# Patient Record
Sex: FEMALE | Race: WHITE | NOT HISPANIC OR LATINO | Employment: FULL TIME | URBAN - METROPOLITAN AREA
[De-identification: names, ages, dates, MRNs, and addresses within clinical notes are randomized per-mention and may not be internally consistent; named-entity substitution may affect disease eponyms.]

---

## 2017-01-06 ENCOUNTER — GENERIC CONVERSION - ENCOUNTER (OUTPATIENT)
Dept: OTHER | Facility: OTHER | Age: 52
End: 2017-01-06

## 2017-09-18 ENCOUNTER — ALLSCRIPTS OFFICE VISIT (OUTPATIENT)
Dept: OTHER | Facility: OTHER | Age: 52
End: 2017-09-18

## 2017-09-18 DIAGNOSIS — Z13.820 ENCOUNTER FOR SCREENING FOR OSTEOPOROSIS: ICD-10-CM

## 2017-09-18 DIAGNOSIS — Z12.31 ENCOUNTER FOR SCREENING MAMMOGRAM FOR MALIGNANT NEOPLASM OF BREAST: ICD-10-CM

## 2017-09-18 LAB
BILIRUB UR QL STRIP: 1
CLARITY UR: NORMAL
COLOR UR: CLEAR
GLUCOSE (HISTORICAL): NORMAL
HGB UR QL STRIP.AUTO: NORMAL
KETONES UR STRIP-MCNC: NORMAL MG/DL
LEUKOCYTE ESTERASE UR QL STRIP: 75
NITRITE UR QL STRIP: NORMAL
PH UR STRIP.AUTO: 7 [PH]
PROT UR STRIP-MCNC: NORMAL MG/DL
SP GR UR STRIP.AUTO: 1.01
UROBILINOGEN UR QL STRIP.AUTO: 1

## 2017-09-20 ENCOUNTER — GENERIC CONVERSION - ENCOUNTER (OUTPATIENT)
Dept: OTHER | Facility: OTHER | Age: 52
End: 2017-09-20

## 2017-10-11 ENCOUNTER — GENERIC CONVERSION - ENCOUNTER (OUTPATIENT)
Dept: OTHER | Facility: OTHER | Age: 52
End: 2017-10-11

## 2017-10-11 LAB
A/G RATIO (HISTORICAL): 1.9 (ref 1.2–2.2)
ALBUMIN SERPL BCP-MCNC: 4.1 G/DL (ref 3.5–5.5)
ALP SERPL-CCNC: 64 IU/L (ref 39–117)
ALT SERPL W P-5'-P-CCNC: 20 IU/L (ref 0–32)
AMYLASE (HISTORICAL): 44 U/L (ref 31–124)
AST SERPL W P-5'-P-CCNC: 20 IU/L (ref 0–40)
BASOPHILS # BLD AUTO: 0.1 X10E3/UL (ref 0–0.2)
BASOPHILS # BLD AUTO: 1 %
BILIRUB SERPL-MCNC: 0.7 MG/DL (ref 0–1.2)
BUN SERPL-MCNC: 14 MG/DL (ref 6–24)
BUN/CREA RATIO (HISTORICAL): 21 (ref 9–23)
CALCIUM SERPL-MCNC: 9.1 MG/DL (ref 8.7–10.2)
CHLORIDE SERPL-SCNC: 100 MMOL/L (ref 96–106)
CHOLEST SERPL-MCNC: 212 MG/DL (ref 100–199)
CHOLEST/HDLC SERPL: 4.8 RATIO UNITS (ref 0–4.4)
CO2 SERPL-SCNC: 25 MMOL/L (ref 18–29)
CREAT SERPL-MCNC: 0.67 MG/DL (ref 0.57–1)
DEPRECATED RDW RBC AUTO: 12.7 % (ref 12.3–15.4)
EGFR AFRICAN AMERICAN (HISTORICAL): 116 ML/MIN/1.73
EGFR-AMERICAN CALC (HISTORICAL): 101 ML/MIN/1.73
EOSINOPHIL # BLD AUTO: 0.2 X10E3/UL (ref 0–0.4)
EOSINOPHIL # BLD AUTO: 4 %
GLUCOSE SERPL-MCNC: 99 MG/DL (ref 65–99)
HCT VFR BLD AUTO: 40.5 % (ref 34–46.6)
HDLC SERPL-MCNC: 44 MG/DL
HGB BLD-MCNC: 14 G/DL (ref 11.1–15.9)
IMM.GRANULOCYTES (CD4/8) (HISTORICAL): 0 %
IMM.GRANULOCYTES (CD4/8) (HISTORICAL): 0 X10E3/UL (ref 0–0.1)
LDLC SERPL CALC-MCNC: 132 MG/DL (ref 0–99)
LIPASE SERPL-CCNC: 30 U/L (ref 14–72)
LYMPHOCYTES # BLD AUTO: 2 X10E3/UL (ref 0.7–3.1)
LYMPHOCYTES # BLD AUTO: 36 %
MAGNESIUM SERPL-MCNC: 2 MG/DL (ref 1.6–2.3)
MCH RBC QN AUTO: 30.8 PG (ref 26.6–33)
MCHC RBC AUTO-ENTMCNC: 34.6 G/DL (ref 31.5–35.7)
MCV RBC AUTO: 89 FL (ref 79–97)
MONOCYTES # BLD AUTO: 0.4 X10E3/UL (ref 0.1–0.9)
MONOCYTES (HISTORICAL): 8 %
NEUTROPHILS # BLD AUTO: 2.8 X10E3/UL (ref 1.4–7)
NEUTROPHILS # BLD AUTO: 51 %
PLATELET # BLD AUTO: 255 X10E3/UL (ref 150–379)
POTASSIUM SERPL-SCNC: 4.4 MMOL/L (ref 3.5–5.2)
RBC (HISTORICAL): 4.55 X10E6/UL (ref 3.77–5.28)
SODIUM SERPL-SCNC: 139 MMOL/L (ref 134–144)
TOT. GLOBULIN, SERUM (HISTORICAL): 2.2 G/DL (ref 1.5–4.5)
TOTAL PROTEIN (HISTORICAL): 6.3 G/DL (ref 6–8.5)
TRIGL SERPL-MCNC: 178 MG/DL (ref 0–149)
VLDLC SERPL CALC-MCNC: 36 MG/DL (ref 5–40)
WBC # BLD AUTO: 5.6 X10E3/UL (ref 3.4–10.8)

## 2017-10-12 LAB
ANTI-NUCLEAR ANTIBODY (ANA) (HISTORICAL): NEGATIVE
BACTERIA UR QL AUTO: ABNORMAL
BILIRUB UR QL STRIP: NEGATIVE
COLOR UR: YELLOW
COMMENT (HISTORICAL): CLEAR
FECAL OCCULT BLOOD DIAGNOSTIC (HISTORICAL): NEGATIVE
GLUCOSE (HISTORICAL): NEGATIVE
HBA1C MFR BLD HPLC: 5.2 % (ref 4.8–5.6)
HEPATITIS C ANTIBODY (HISTORICAL): <0.1 S/CO RATIO (ref 0–0.9)
KETONES UR STRIP-MCNC: NEGATIVE MG/DL
LEUKOCYTE ESTERASE UR QL STRIP: ABNORMAL
MICROSCOPIC EXAMINATION (HISTORICAL): ABNORMAL
MUCUS THREADS (HISTORICAL): PRESENT
NITRITE UR QL STRIP: NEGATIVE
NON-SQ EPI CELLS URNS QL MICRO: ABNORMAL /HPF
PH UR STRIP.AUTO: 6 [PH] (ref 5–7.5)
PROT UR STRIP-MCNC: NEGATIVE MG/DL
RBC (HISTORICAL): ABNORMAL /HPF
SP GR UR STRIP.AUTO: 1.02 (ref 1–1.03)
TSH SERPL DL<=0.05 MIU/L-ACNC: 2.21 UIU/ML (ref 0.45–4.5)
UROBILINOGEN UR QL STRIP.AUTO: 0.2 EU/DL (ref 0.2–1)
WBC # BLD AUTO: ABNORMAL /HPF

## 2017-10-13 ENCOUNTER — GENERIC CONVERSION - ENCOUNTER (OUTPATIENT)
Dept: OTHER | Facility: OTHER | Age: 52
End: 2017-10-13

## 2017-10-13 LAB
CULTURE RESULT (HISTORICAL): ABNORMAL
INTERPRETATION (HISTORICAL): NORMAL
MISCELLANEOUS LAB TEST RESULT (HISTORICAL): ABNORMAL

## 2017-11-01 ENCOUNTER — HOSPITAL ENCOUNTER (OUTPATIENT)
Dept: RADIOLOGY | Facility: CLINIC | Age: 52
Discharge: HOME | End: 2017-11-01
Payer: COMMERCIAL

## 2017-11-01 DIAGNOSIS — Z13.820 ENCOUNTER FOR SCREENING FOR OSTEOPOROSIS: ICD-10-CM

## 2017-11-02 ENCOUNTER — HOSPITAL ENCOUNTER (OUTPATIENT)
Dept: RADIOLOGY | Facility: CLINIC | Age: 52
Discharge: HOME | End: 2017-11-02
Payer: COMMERCIAL

## 2017-11-02 PROCEDURE — 77080 DXA BONE DENSITY AXIAL: CPT

## 2017-11-28 ENCOUNTER — GENERIC CONVERSION - ENCOUNTER (OUTPATIENT)
Dept: OTHER | Facility: OTHER | Age: 52
End: 2017-11-28

## 2018-01-09 ENCOUNTER — GENERIC CONVERSION - ENCOUNTER (OUTPATIENT)
Dept: FAMILY MEDICINE CLINIC | Facility: CLINIC | Age: 53
End: 2018-01-09

## 2018-01-11 NOTE — RESULT NOTES
Verified Results  (1) AMYLASE [id number] 07:46AM Dickie Barley     Test Name Result Flag Reference   Amylase, Serum 44 U/L       (1) CBC/PLT/DIFF 11Oct2017 07:46AM Dickie Barley     Test Name Result Flag Reference   WBC 5 6 x10E3/uL  3 4-10 8   RBC 4 55 x10E6/uL  3 77-5 28   Hemoglobin 14 0 g/dL  11 1-15 9   Hematocrit 40 5 %  34 0-46  6   MCV 89 fL  79-97   MCH 30 8 pg  26 6-33 0   MCHC 34 6 g/dL  31 5-35 7   RDW 12 7 %  12 3-15 4   Platelets 825 G57E2/QK  150-379   Neutrophils 51 %  Not Estab  Lymphs 36 %  Not Estab  Monocytes 8 %  Not Estab  Eos 4 %  Not Estab  Basos 1 %  Not Estab  Neutrophils (Absolute) 2 8 x10E3/uL  1 4-7 0   Lymphs (Absolute) 2 0 x10E3/uL  0 7-3 1   Monocytes(Absolute) 0 4 x10E3/uL  0 1-0 9   Eos (Absolute) 0 2 x10E3/uL  0 0-0 4   Baso (Absolute) 0 1 x10E3/uL  0 0-0 2   Immature Granulocytes 0 %  Not Estab     Immature Grans (Abs) 0 0 x10E3/uL  0 0-0 1     (1) COMPREHENSIVE METABOLIC PANEL [id number] 60:52HW Dickie Barley     Test Name Result Flag Reference   Glucose, Serum 99 mg/dL  65-99   BUN 14 mg/dL  6-24   Creatinine, Serum 0 67 mg/dL  0 57-1 00   BUN/Creatinine Ratio 21  9-23   Sodium, Serum 139 mmol/L  134-144   Potassium, Serum 4 4 mmol/L  3 5-5 2   Chloride, Serum 100 mmol/L     Carbon Dioxide, Total 25 mmol/L  18-29   Calcium, Serum 9 1 mg/dL  8 7-10 2   Protein, Total, Serum 6 3 g/dL  6 0-8 5   Albumin, Serum 4 1 g/dL  3 5-5 5   Globulin, Total 2 2 g/dL  1 5-4 5   A/G Ratio 1 9  1 2-2 2   Bilirubin, Total 0 7 mg/dL  0 0-1 2   Alkaline Phosphatase, S 64 IU/L     AST (SGOT) 20 IU/L  0-40   ALT (SGPT) 20 IU/L  0-32   eGFR If NonAfricn Am 101 mL/min/1 73  >59   eGFR If Africn Am 116 mL/min/1 73  >59     (1) LIPASE 11Oct2017 07:46AM Dickie Barley     Test Name Result Flag Reference   Lipase, Serum 30 U/L  14-72   **Please note reference interval change**     (1) LIPID PANEL, FASTING [id number] 07:46AM Dickie Barley     Test Name Result Flag Reference   Cholesterol, Total 212 mg/dL H 100-199   Triglycerides 178 mg/dL H 0-149   HDL Cholesterol 44 mg/dL  >39   VLDL Cholesterol Navin 36 mg/dL  5-40   LDL Cholesterol Calc 132 mg/dL H 0-99   T  Chol/HDL Ratio 4 8 ratio units H 0 0-4 4   T  Chol/HDL Ratio                                                             Men  Women                                               1/2 Avg  Risk  3 4    3 3                                                   Avg Risk  5 0    4 4                                                2X Avg  Risk  9 6    7 1                                                3X Avg  Risk 23 4   11 0     (1) MAGNESIUM 11Oct2017 07:46AM [facility]     Test Name Result Flag Reference   Magnesium, Serum 2 0 mg/dL  1 6-2 3     (1) TSH WITH FT4 REFLEX 11Oct2017 07:46AM [facility]     Test Name Result Flag Reference   TSH 2 210 uIU/mL  0 450-4 500     (1) HEMOGLOBIN A1C 11Oct2017 07:46AM [facility]     Test Name Result Flag Reference   Hemoglobin A1c 5 2 %  4 8-5 6   Pre-diabetes: 5 7 - 6 4           Diabetes: >6 4           Glycemic control for adults with diabetes: <7 0     (LC) Urinalysis, Complete 11Oct2017 07:46AM [facility]     Test Name Result Flag Reference   Specific Gravity 1 019  1 005-1 030   pH 6 0  5 0-7 5   Urine-Color Yellow  Yellow   Appearance Clear  Clear   WBC Esterase 1+ A Negative   Protein Negative  Negative/Trace   Glucose Negative  Negative   Ketones Negative  Negative   Occult Blood Negative  Negative   Bilirubin Negative  Negative   Urobilinogen,Semi-Qn 0 2 EU/dL  0 2-1 0   Nitrite, Urine Negative  Negative   Microscopic Examination See below:     WBC 6-10 /hpf A 0 -  5   RBC 0-2 /hpf  0 -  2   Epithelial Cells (non renal) 0-10 /hpf  0 - 10   Mucus Threads Present  Not Estab     Bacteria None seen  None seen/Few     (LC) Urine Culture, Routine 11Oct2017 07:46AM [facility]     Test Name Result Flag Reference   Urine Culture, Routine Final report A Result 1 Comment A    Beta hemolytic Streptococcus, group B  Less than 10,000 colonies/mL  Penicillin and ampicillin are drugs of choice for treatment of  beta-hemolytic streptococcal infections  Susceptibility testing of  penicillins and other beta-lactam agents approved by the FDA for  treatment of beta-hemolytic streptococcal infections need not be  performed routinely because nonsusceptible isolates are extremely  rare in any beta-hemolytic streptococcus and have not been reported  for Streptococcus pyogenes (group A)  (CLSI 2011)     ([facility] [facility]) HCV Antibody [id number] 07:46AM [facility]     Test Name Result Flag Reference   Hep C Virus Ab <0 1 s/co ratio  0 0-0 9   Negative:     < 0 8                                              Indeterminate: 0 8 - 0 9                                                   Positive:     > 0 9                  The CDC recommends that a positive HCV antibody result                  be followed up with a HCV Nucleic Acid Amplification                  test (673725)       [facility]) BILLIE w/Reflex [id number] 07:46AM [facility]     Test Name Result Flag Reference   BILLIE Direct Negative  Negative

## 2018-01-12 NOTE — RESULT NOTES
Verified Results  Urine Dip Automated- POC [id number] 09:12AM Houston Mariah     Test Name Result Flag Reference   Color Clear     Clarity Transparent     Leukocytes 75     Nitrite neg     Blood neg     Bilirubin 1     Urobilinogen 1     Protein neg     Ph 7     Specific Gravity 1 010     Ketone neg     Glucose norm

## 2018-01-12 NOTE — RESULT NOTES
Message   please inform chest x-ray normal-thanks     Verified Results  * XR CHEST PA & LATERAL [id number] 09:16AM Troy Fend     Test Name Result Flag Reference   XR CHEST PA & LATERAL (Report)     CHEST - DUAL ENERGY     INDICATION: Wheezing and cough  Smoker with possible COPD  COMPARISON: None     VIEWS: PA (including soft tissue/bone algorithms) and lateral projections; 4 images     FINDINGS:        Cardiomediastinal silhouette appears unremarkable  The lungs are clear  No pneumothorax or pleural effusion  Visualized osseous structures appear within normal limits for the patient's age  IMPRESSION:     No active pulmonary disease  Workstation performed: [id number]     Signed by:    Fred Cherry MD   9/26/16

## 2018-01-13 ENCOUNTER — GENERIC CONVERSION - ENCOUNTER (OUTPATIENT)
Dept: OTHER | Facility: OTHER | Age: 53
End: 2018-01-13

## 2018-01-13 VITALS
HEART RATE: 70 BPM | BODY MASS INDEX: 30.36 KG/M2 | TEMPERATURE: 97.4 F | SYSTOLIC BLOOD PRESSURE: 120 MMHG | WEIGHT: 205 LBS | DIASTOLIC BLOOD PRESSURE: 80 MMHG | HEIGHT: 69 IN | RESPIRATION RATE: 16 BRPM

## 2018-01-13 NOTE — PROGRESS NOTES
Assessment    1  Encounter for annual physical exam (V70 0) (Z00 00)   2  Body mass index (BMI) of 30 0 to 30 9 in adult (V85 30) (Z68 30)   3  Need for influenza vaccination (V04 81) (Z23)    Plan  Abnormal blood sugar    · (1) HEMOGLOBIN A1C; Status:Active; Requested for:37Nua3608; Abnormal blood sugar, Dysuria, Fatigue    · (LC) Urinalysis, Complete; Status:Active; Requested for:87Klx0115; Allergic asthma    · Montelukast Sodium 10 MG Oral Tablet (Singulair); one tab po qpm  Anxiety    · Citalopram Hydrobromide 20 MG Oral Tablet; TAKE 1 TABLET BY MOUTH  ONCE A DAY  Anxiety, Health Maintenance, Fatigue, Hyperlipidemia, Myalgia, Nontoxic goiter,  unspecified, Painful joint, Palpitation    · (1) AMYLASE; Status:Active; Requested for:78Tfo2998;    · (1) BILLIE SCREEN W/REFLEX TO TITER/PATTERN; Status:Active; Requested  for:68Avz5215;    · (1) CBC/PLT/DIFF; Status:Active; Requested for:48Rwr2336;    · (1) COMPREHENSIVE METABOLIC PANEL; Status:Active; Requested for:92Isa0571;    · (1) LIPASE; Status:Active; Requested for:90Jwg5526;    · (1) LIPID PANEL, FASTING; Status:Active; Requested for:88Cca5002;    · (1) MAGNESIUM; Status:Active; Requested for:15Xqz0800;    · (1) TSH WITH FT4 REFLEX; Status:Active; Requested for:26Lxu6586;   Dysuria    · (LC) Urine Culture, Routine; Status:Hold For - Required information; Requested  for:18Sep2017;   Encounter for screening mammogram for malignant neoplasm of breast    · * MAMMO SCREENING BILATERAL W CAD; Status:Hold For - Scheduling; Requested  for:41Gzc5898; Health Maintenance    · (1) HEP C ANTIBODY; Status:Active;  Requested for:36Zka5340;    · Urine Dip Automated- POC; Status:Resulted - Requires Verification;   Done: 50SIN5347  09:12AM  History of Lyme disease    · Doxycycline Hyclate 100 MG Oral Tablet; one cap po bid  Need for influenza vaccination    · Fluzone Quadrivalent 0 5 ML Intramuscular Suspension Prefilled Syringe  Screening for osteoporosis    · * DXA BONE DENSITY SPINE HIP AND PELVIS; Status:Hold For - Scheduling;  Requested for:34Hgz1753;     Discussion/Summary  health maintenance visit Currently, she eats an adequate diet and has an adequate exercise regimen  the risks and benefits of cervical cancer screening were discussed Breast cancer screening: the risks and benefits of breast cancer screening were discussed  Colorectal cancer screening: the risks and benefits of colorectal cancer screening were discussed  Osteoporosis screening: the risks and benefits of osteoporosis screening were discussed  Screening lab work includes hemoglobin, glucose, lipid profile, thyroid function testing, 25-hydroxyvitamin D and urinalysis  The risks and benefits of immunizations were discussed  Advice and education were given regarding nutrition, weight bearing exercise, weight loss, self skin examination, helmet use, seat belt use and advanced directive planning  Patient discussion: discussed with the patient  Hepatitis C Screening: the patient was counseled on Hepatitis C screening  Possible side effects of new medications were reviewed with the patient/guardian today  The treatment plan was reviewed with the patient/guardian  The patient/guardian understands and agrees with the treatment plan      Chief Complaint  patient presenting today for her annual physical , Colon[facility] was done [facility] ago will request results   Depression screen was updated sl/cma      History of Present Illness  HM, Adult Female: The patient is being seen for a health maintenance evaluation  The last health maintenance visit was 1 year(s) ago  General Health: The patient's health since the last visit is described as good  She has regular dental visits  Immunizations status: up to date   turner shot given today  Lifestyle:  She has weight concerns  She does not exercise regularly  She does not use tobacco  She consumes alcohol  She reports occasional alcohol use   She denies drug use  Reproductive health:  she is sexually active  pregnancy history: G 3P 3  Screening: cancer screening reviewed and updated  metabolic screening reviewed and updated  risk screening reviewed and updated  Review of Systems    Constitutional: recent weight gain and feeling tired  Eyes: eyesight problems  ENT: no complaints of earache, no loss of hearing, no nose bleeds, no nasal discharge, no sore throat, no hoarseness  Cardiovascular: No complaints of slow heart rate, no fast heart rate, no chest pain, no palpitations, no leg claudication, no lower extremity edema  Respiratory: No complaints of shortness of breath, no wheezing, no cough, no SOB on exertion, no orthopnea, no PND  Gastrointestinal: No complaints of abdominal pain, no constipation, no nausea or vomiting, no diarrhea, no bloody stools  Musculoskeletal: arthralgias and myalgias  Integumentary: skin lesion  Neurological: No complaints of headache, no confusion, no convulsions, no numbness, no dizziness or fainting, no tingling, no limb weakness, no difficulty walking  Psychiatric: anxiety and depression  Endocrine: ??thyroid d/o  Active Problems    1  Abnormal blood sugar (790 29) (R73 09)   2  Allergic asthma (493 00) (J45 909)   3  Anxiety (300 00) (F41 9)   4  Body mass index (BMI) of 30 0 to 30 9 in adult (V85 30) (Z68 30)   5  Chronic cough (786 2) (R05)   6  Dysuria (788 1) (R30 0)   7  Encounter for annual physical exam (V70 0) (Z00 00)   8  Encounter for screening mammogram for malignant neoplasm of breast (V76 12)   (Z12 31)   9  Fatigue (780 79) (R53 83)   10  History of colon polyps (V12 72) (Z86 010)   11  History of HPV infection (V12 09) (Z86 19)   12  History of Lyme disease (V12 09) (Z86 19)   13  Hyperlipidemia (272 4) (E78 5)   14  Myalgia (729 1) (M79 1)   15  Need for influenza vaccination (V04 81) (Z23)   16  Nontoxic goiter, unspecified (241 9) (E04 9)   17   Overweight (278 02) (E66 3) 18  Painful joint (719 40) (M25 50)   19  Palpitation (785 1) (R00 2)   20  Screening for cervical cancer (V76 2) (Z12 4)   21  Screening for osteoporosis (V82 81) (Z13 820)   22  Sleep apnea (780 57) (G47 30)   23  Stye (373 11) (H00 019)   24  Wheeze (786 07) (R06 2)    Past Medical History    · History of Contact dermatitis due to plant (692 6) (L25 5)   · History of acute bronchitis (V12 69) (Z87 09)   · History of Lyme disease (V12 09) (Z86 19)   · History of neuropathy (V12 49) (Z86 69)   · History of Stye (373 11) (H00 019)    Surgical History    · History of Breast Surgery   · History of Uterine Myomectomy   · History of Varicose Vein Ligation    Family History  Mother    · Family history of Heart disease  Father    · Family history of abdominal aortic aneurysm (V17 49) (Z82 49)   · Family history of Heart disease   · Family history of Hypertension  Child    · Family history of Colon cancer  Sister    · Family history of Breast cancer  Grandparent    · Family history of Heart disease  Grandmother    · Family history of Breast cancer  Maternal Grandmother    · Family history of Alzheimer's dementia  Maternal Grandfather    · Family history of Colon cancer  Maternal Uncle    · Family history of Alzheimer's dementia    Social History    · Former smoker (Y88 34) (T78 464)   ·    · No drug use   · Occasional alcohol use   · Social alcohol use (Z78 9)    Current Meds   1  Boron 3 MG Oral Capsule; Therapy: (25-62-29-72) to Recorded   2  Calcium 500 MG CAPS; Therapy: (25-62-29-72) to Recorded   3  Citalopram Hydrobromide 20 MG Oral Tablet; TAKE 1 TABLET BY MOUTH ONCE A DAY; Therapy: 38FGX7521 to (Last Rx:13Ury5770)  Requested for: 68Bdj6430 Ordered   4   MG Oral Capsule; Therapy: (25-62-29-72) to Recorded   5  Fish Oil 1000 MG Oral Capsule; Therapy: (25-62-29-72) to Recorded   6  Magnesium 200 MG Oral Tablet; Therapy: (Recorded:23Shh9423) to Recorded   7  Montelukast Sodium 10 MG Oral Tablet; one tab po qpm;   Therapy: 98Ptn8846 to (Last Rx:10Sep2016)  Requested for: 76Imm8664 Ordered   8  Probiotic Oral Capsule; Therapy: 79QCH0737 to Recorded   9  Vitamin D3 CAPS; Therapy: (Recorded:08Oct2014) to Recorded    Allergies    1  Codeine    Immunizations   1 2 3    Influenza  16-Oct-2014  (49y) 09-Oct-2015  (50y) 30-Nov-2016  (51y)    Tdap  25-Jul-2009  (44y)       Vitals   Recorded: 88Xjj8125 09:01AM   Temperature 97 4 F   Heart Rate 70   Respiration 16   Systolic 090   Diastolic 80   Height 5 ft 8 5 in   Weight 205 lb    BMI Calculated 30 72   BSA Calculated 2 08     Physical Exam    Constitutional   General appearance: No acute distress, well appearing and well nourished  Eyes   Conjunctiva and lids: No swelling, erythema or discharge  Pupils and irises: Equal, round, reactive to light  Ears, Nose, Mouth, and Throat   External inspection of ears and nose: Normal     Otoscopic examination: Tympanic membranes translucent with normal light reflex  Canals patent without erythema  Hearing: Normal     Nasal mucosa, septum, and turbinates: Normal without edema or erythema  Lips, teeth, and gums: Normal, good dentition  Oropharynx: Normal with no erythema, edema, exudate or lesions  Neck   Neck: Supple, symmetric, trachea midline, no masses  Thyroid: Abnormal   The thyroid was diffusely enlarged, but was nontender  Pulmonary   Respiratory effort: No increased work of breathing or signs of respiratory distress  Auscultation of lungs: Clear to auscultation  Cardiovascular   Auscultation of heart: Normal rate and rhythm, normal S1 and S2, no murmurs  Carotid pulses: 2+ bilaterally  Pedal pulses: 2+ bilaterally  Examination of extremities for edema and/or varicosities: Normal     Abdomen   Abdomen: Non-tender, no masses  Liver and spleen: No hepatomegaly or splenomegaly  Examination for hernias: No hernia appreciated      Lymphatic Palpation of lymph nodes in neck: No lymphadenopathy  Musculoskeletal   Gait and station: Normal     Digits and nails: Normal without clubbing or cyanosis  Joints, bones, and muscles: Normal     Range of motion: Normal     Stability: Normal     Muscle strength/tone: Normal     Skin   Skin and subcutaneous tissue: Normal without rashes or lesions  Neurologic   Cranial nerves: Cranial nerves II-XII intact  Reflexes: 2+ and symmetric  Sensation: No sensory loss  Psychiatric   Orientation to person, place, and time: Normal     Mood and affect: Normal        Results/Data  Urine Dip Automated- POC 90Gml8558 09:12AM Marycarmen Palmer     Test Name Result Flag Reference   Color Clear     Clarity Transparent     Leukocytes 75     Nitrite neg     Blood neg     Bilirubin 1     Urobilinogen 1     Protein neg     Ph 7     Specific Gravity 1 010     Ketone neg     Glucose norm         Procedure    Procedure: Visual Acuity Test    Indication: routine screening  Inforrmation supplied by a Snellen chart  Results: 20/20 in both eyes with corrective device, 20/20 in the right eye with corrective device, 20/20 in the left eye with corrective device normal in both eyes  Color vision was and the results were normal    The patient was cooperative  Health Management  Encounter for screening mammogram for malignant neoplasm of breast   * MAMMO SCREENING BILATERAL W CAD; every 1 year; Last 15HTU0301; Next Due:  24PQJ1583; Active  History of Encounter for pre-employment health screening examination   Digital Bilateral Screening Mammogram With CAD; every 1 year; Last 40CSJ8238; Next  Due: 61JOC8975;  Overdue    Signatures   Electronically signed by : LISETTE Ybarra ; Sep 20 2017  7:06AM EST                       (Author)

## 2018-01-14 NOTE — RESULT NOTES
Verified Results  * DXA BONE DENSITY SPINE HIP AND PELVIS [id number] 07:46AM Odilia Shin    Order Number: [id number]    - Patient Instructions: To schedule this appointment, please contact Central Scheduling at [phone]  Test Name Result Flag Reference   DXA BONE DENSITY SPINE HIP AND PELVIS (Report)     CENTRAL DXA SCAN     CLINICAL HISTORY:  47year old post-menopausal  female risk factors include history of smoking  Osteoporosis screening  TECHNIQUE: Bone densitometry was performed using a Total Prestige bone densitometer  Regions of interest appear properly placed  There are no obvious fractures or other confounding variables which could limit the study  COMPARISON: None  RESULTS:    LUMBAR SPINE: L1, L2 and L4:   BMD 1 218 gm/cm2   T-score 0 3   Z-score 1 0     LEFT TOTAL HIP:   BMD 0 969 gm/cm2   T-score -0 3   Z-score 0 3     LEFT FEMORAL NECK:   BMD 0 878 gm/cm2   T-score -1 2   Z-score -0 2     TOTAL RIGHT HIP:        BMD 0 956 gm/sq-cm,      T-score is -0 4      Z-score is 0 2                FEMORAL NECK RIGHT HIP :     BMD 0 924 gm/sq-cm,     T-score is -0 8     Z-score is 0 1             IMPRESSION:   1  Based on the [facility] classification, the T-score of -1 2 in the left hip is consistent with low bone mineral density  2  The 10 year risk of hip fracture is 0 3 % with the 10 year risk of major osteoporotic fracture being 5 % as calculated by the WHO fracture risk assessment tool (FRAX)  The current NOF guidelines recommend treating patients with FRAX 10 year risk    score of >3% for hip fracture and >20% for major osteoporotic fracture  3  Any secondary causes of low bone mineral density should be excluded prior to treatment, if clinically indicated     4  A daily intake of at least 1200 mg calcium and 800 - 1000 IU of Vitamin D, as well as weight bearing and muscle strengthening exercise, fall prevention and avoidance of tobacco and excessive alcohol intake as basic preventive measures are suggested             WHO CLASSIFICATION:   Normal (a T-score of -1 0 or higher)   Low bone mineral density (a T-score of less than -1 0 but higher than -2 5)   Osteoporosis (a T-score of -2 5 or less)   Severe osteoporosis (a T-score of -2 5 or less with a fragility fracture)             Workstation performed: [id number]     Signed by:   Thelma Peters DO   11/2/17

## 2018-01-16 NOTE — RESULT NOTES
Message   please inform chest x-ray normal-thanks     Verified Results  [address] [place] [address] [id number] 09:14AM Lyndon Edwards Order Number: [id number]    - Patient Instructions: To schedule this appointment, please contact Central Scheduling at [phone]   Order Number: [id number]    - Patient Instructions: To schedule this appointment, please contact Central Scheduling at [phone]  Test Name Result Flag Reference   US THYROID (Report)     THYROID ULTRASOUND     INDICATION: Thyroid is enlarged on clinical examination  COMPARISON: None  TECHNIQUE:  Ultrasound of the thyroid was performed with a high frequency linear transducer in transverse and sagittal planes including volumetric imaging sweeps as well as traditional still imaging technique  FINDINGS:   Normal homogeneous smooth echotexture  Right gland: 4 1 x 1 2 x 1 3 cm  No dominant nodules  Left gland: 4 3 x 1 4 x 1 5 cm  No dominant nodules  Isthmus: 0 2 cm in AP dimension  No dominant nodules             IMPRESSION:      Normal        Workstation performed: [id number]     Signed by:   Darin Guerrier MD   9/26/16

## 2018-01-23 NOTE — PROGRESS NOTES
Assessment   1  Encounter for annual physical exam (V70 0) (Z00 00)  2  Body mass index (BMI) of 30 0 to 30 9 in adult (V85 30) (Z68 30)  3  Anxiety (300 00) (F41 9)  4  Allergic asthma (493 00) (J45 909)  5  Hyperlipidemia (272 4) (E78 5)  6  Thyromegaly (240 9) (E04 9)  7  Wheeze (786 07) (R06 2)    Plan  Allergic asthma    · Start: Montelukast Sodium 10 MG Oral Tablet (Singulair); one tab po qpm  Anxiety    · Renew: Citalopram Hydrobromide 20 MG Oral Tablet; TAKE 1 TABLET BY MOUTH  ONCE A DAY  Arthralgia, Body mass index (BMI) of 30 0 to 30 9 in adult, Fatigue, Hyperlipidemia,  Myalgia, Palpitation    · (1) AMYLASE; Status:Active; Requested for:26Qfy4906;    · (1) CBC/PLT/DIFF; Status:Active; Requested for:69Cyl1988;    · (1) COMPREHENSIVE METABOLIC PANEL; Status:Active; Requested for:39Bhy8872;    · (1) FSH; Status:Active; Requested for:62Piq0346;    · (1) LH (LEUTINIZING HORMONE); Status:Active; Requested for:64Bbk2083;    · (1) LIPASE; Status:Active; Requested for:20Scd6406;    · (1) LIPID PANEL, FASTING; Status:Active; Requested for:41Efs1598;    · (1) MAGNESIUM; Status:Active; Requested for:80Xyx5990;    · (1) TSH; Status:Active; Requested for:89Fzd1566; Health Maintenance    · Urine Dip Automated- POC; Status:Active - Perform Order; Requested for:79Buh7016; Thyromegaly    · US THYROID; Status:Hold For - Scheduling; Requested for:78Sbv4330;   Wheeze    · * XR CHEST PA & LATERAL; Status:Active; Requested for:43Vti4014;     Discussion/Summary  health maintenance visit Currently, she eats an adequate diet and has an adequate exercise regimen  cervical cancer screening is current Breast cancer screening: mammogram is current  Colorectal cancer screening: the risks and benefits of colorectal cancer screening were discussed  Screening lab work includes hemoglobin, glucose, lipid profile, thyroid function testing and urinalysis  The risks and benefits of immunizations were discussed   Advice and education were given regarding nutrition, weight bearing exercise, weight loss, vitamin D supplements, self skin examination, helmet use, seat belt use and advanced directive planning  Patient discussion: discussed with the patient  Possible side effects of new medications were reviewed with the patient/guardian today  Chief Complaint  pt here for PE   Would like to discuss if she has beginning of COPD  Asked if she can get her prescript for 90 day?tc/cma      History of Present Illness  HM, Adult Female: The patient is being seen for a health maintenance evaluation  The last health maintenance visit was 1 year(s) ago  General Health: The patient's health since the last visit is described as good  She has regular dental visits  She complains of vision problems  She denies hearing loss  Lifestyle:  She has weight concerns  She does not exercise regularly  She does not use tobacco  She consumes alcohol  She reports occasional alcohol use  She denies drug use  Reproductive health:  she is sexually active  pregnancy history: G 3P 3  Screening: cancer screening reviewed and updated  metabolic screening reviewed and updated  risk screening reviewed and updated  Review of Systems    Constitutional: recent weight gain and feeling tired  Eyes: eyesight problems  ENT: no complaints of earache, no loss of hearing, no nose bleeds, no nasal discharge, no sore throat, no hoarseness  Cardiovascular: No complaints of slow heart rate, no fast heart rate, no chest pain, no palpitations, no leg claudication, no lower extremity edema  Respiratory: cough and wheezing  Gastrointestinal: No complaints of abdominal pain, no constipation, no nausea or vomiting, no diarrhea, no bloody stools  Genitourinary: No complaints of dysuria, no incontinence, no pelvic pain, no dysmenorrhea, no vaginal discharge or bleeding  Musculoskeletal: arthralgias and myalgias     Integumentary: No complaints of skin rash or lesions, no itching, no skin wounds, no breast pain or lump  Neurological: No complaints of headache, no confusion, no convulsions, no numbness, no dizziness or fainting, no tingling, no limb weakness, no difficulty walking  Psychiatric: anxiety  Endocrine: No complaints of proptosis, no hot flashes, no muscle weakness, no deepening of the voice, no feelings of weakness  Hematologic/Lymphatic: No complaints of swollen glands, no swollen glands in the neck, does not bleed easily, does not bruise easily  Active Problems   1  Allergic asthma (493 00) (J45 909)  2  Anxiety (300 00) (F41 9)  3  Arthralgia (719 40) (M25 50)  4  Body mass index (BMI) of 30 0 to 30 9 in adult (V85 30) (Z68 30)  5  Chronic cough (786 2) (R05)  6  Encounter for annual physical exam (V70 0) (Z00 00)  7  Encounter for screening mammogram for malignant neoplasm of breast (V76 12)   (Z12 31)  8  Fatigue (780 79) (R53 83)  9  History of colon polyps (V12 72) (Z86 010)  10  History of HPV infection (V12 09) (Z86 19)  11  Hyperlipidemia (272 4) (E78 5)  12  Myalgia (729 1) (M79 1)  13  Overweight (278 02) (E66 3)  14  Palpitation (785 1) (R00 2)  15  Screening for cervical cancer (V76 2) (Z12 4)  16  Sleep apnea (780 57) (G47 30)  17  Thyromegaly (240 9) (E04 9)  18   Wheeze (786 07) (R06 2)    Past Medical History    · History of Contact dermatitis due to plant (692 6) (L25 5)   · History of acute bronchitis (V12 69) (Z87 09)   · History of Lyme disease (V12 09) (Z86 19)   · History of neuropathy (V12 49) (Z86 69)   · History of Stye (373 11) (H00 019)    Surgical History    · History of Breast Surgery   · History of Uterine Myomectomy   · History of Varicose Vein Ligation    Family History  Mother    · Family history of Heart disease  Father    · Family history of abdominal aortic aneurysm (V17 49) (Z82 49)   · Family history of Heart disease   · Family history of Hypertension  Child    · Family history of Colon cancer  Sister    · Family history of Breast cancer  Grandparent    · Family history of Heart disease  Grandmother    · Family history of Breast cancer  Maternal Grandmother    · Family history of Alzheimer's dementia  Maternal Grandfather    · Family history of Colon cancer  Maternal Uncle    · Family history of Alzheimer's dementia    Social History    · Former smoker (U12 30) (U36 306)   ·    · No drug use   · Occasional alcohol use   · Social alcohol use (Z78 9)    Current Meds  1  Boron 3 MG Oral Capsule; Therapy: [phone]) to Recorded  2  Calcium 500 MG CAPS; Therapy: [phone]) to Recorded  3  Citalopram Hydrobromide 20 MG Oral Tablet; TAKE 1 TABLET BY MOUTH ONCE A DAY; Therapy: 54NJQ0417 to (Last Rx:53Ehb0309)  Requested for: 69LES5607 Ordered  4   MG Oral Capsule; Therapy: [phone] [phone]) to Recorded  5  Fish Oil 1000 MG Oral Capsule; Therapy: [phone]) to Recorded  6  Magnesium 200 MG Oral Tablet; Therapy: (Recorded:27Cgz4905) to Recorded  7  ProAir  (90 Base) MCG/ACT Inhalation Aerosol Solution; INHALE 2 PUFFS   FOUR TIMES DAILY AS DIRECTED; Therapy: 99AFH6801 to (Last Rx:28Mar2016)  Requested for: 28Mar2016 Ordered  8  Probiotic Oral Capsule; Therapy: 40KFE5611 to Recorded  9  Vitamin D3 CAPS; Therapy: (Recorded:08Oct2014) to Recorded    Allergies   1  Codeine    Immunizations   1 2    Influenza  16-Oct-2014 09-Oct-2015    Tdap  25-Jul-2009      Vitals   Recorded: 28LGV9158 71:90GE   Systolic 642, RUE, Sitting   Diastolic 82, RUE, Sitting   Heart Rate 62, R Radial   Pulse Quality Normal, R Radial   Respiration Quality Normal   Respiration 14   Temperature 97 6 F, Tympanic   Height 5 ft 8 5 in   Weight 201 lb    BMI Calculated 30 12   BSA Calculated 2 06     Physical Exam    Constitutional   General appearance: No acute distress, well appearing and well nourished  overweight  Eyes   Conjunctiva and lids: No swelling, erythema or discharge      Pupils and irises: Equal, round, reactive to light  Ears, Nose, Mouth, and Throat   External inspection of ears and nose: Normal     Otoscopic examination: Tympanic membranes translucent with normal light reflex  Canals patent without erythema  Hearing: Normal     Nasal mucosa, septum, and turbinates: Normal without edema or erythema  Lips, teeth, and gums: Normal, good dentition  Oropharynx: Normal with no erythema, edema, exudate or lesions  Neck   Neck: Supple, symmetric, trachea midline, no masses  1    Thyroid: Abnormal  1  The thyroid1  was diffusely enlarged1 , but was nontender1   Pulmonary   Respiratory effort: No increased work of breathing or signs of respiratory distress  Auscultation of lungs: Clear to auscultation  Cardiovascular   Auscultation of heart: Normal rate and rhythm, normal S1 and S2, no murmurs  Carotid pulses: 2+ bilaterally  Pedal pulses: 2+ bilaterally  Examination of extremities for edema and/or varicosities: Normal     Abdomen   Abdomen: Non-tender, no masses  Liver and spleen: No hepatomegaly or splenomegaly  Examination for hernias: No hernia appreciated  Lymphatic   Palpation of lymph nodes in neck: No lymphadenopathy  Musculoskeletal   Gait and station: Normal     Digits and nails: Normal without clubbing or cyanosis  Joints, bones, and muscles: Normal     Range of motion: Normal     Stability: Normal     Muscle strength/tone: Normal     Skin   Skin and subcutaneous tissue: Normal without rashes or lesions  Neurologic   Cranial nerves: Cranial nerves II-XII intact  Reflexes: 2+ and symmetric  Sensation: No sensory loss  Psychiatric   Judgment and insight: Normal     Orientation to person, place, and time: Normal     Recent and remote memory: Intact  Mood and affect: Normal         1 Amended By: Jennifer Sanderson ; Sep 11 2016 12:18 AM EST    Procedure    Procedure: Visual Acuity Test    Indication: routine screening     Results: 20/20 in both eyes with corrective device, 20/25 in the right eye with corrective device, 20/20 in the left eye with corrective device      Health Management  History of Encounter for pre-employment health screening examination   Digital Bilateral Screening Mammogram With CAD; every 1 year; Last 55HCN9444; Next  Due: 93WCD7253;  Active    Signatures   Electronically signed by : LISETTE Ndiaye ; Sep 11 2016 12:18AM EST                       (Author)

## 2018-02-26 NOTE — RESULT NOTES
Verified Results  * MAMMO SCREENING BILATERAL W CAD [id number] 12:00AM Ankur Downey     Test Name Result Flag Reference   MAMMO SCREENING BILATERAL W CAD BENIGN        Summary / No summary entered :      No summary entered   Documents attached :      Genny Santana Mitts: [id number] - Image Encounter -      Luis Lea (Family Medicine) (Result Document)    Plan  Encounter for screening mammogram for malignant neoplasm of breast    · * MAMMO SCREENING BILATERAL W CAD ; every 1 year; Last 87EFZ3273;  Next  [id number]; Status:Active

## 2018-04-28 LAB
LEFT EYE DIABETIC RETINOPATHY: NORMAL
RIGHT EYE DIABETIC RETINOPATHY: NORMAL

## 2018-09-19 DIAGNOSIS — F32.A ANXIETY AND DEPRESSION: Primary | ICD-10-CM

## 2018-09-19 DIAGNOSIS — F41.9 ANXIETY AND DEPRESSION: Primary | ICD-10-CM

## 2018-09-19 DIAGNOSIS — Z91.09 ENVIRONMENTAL ALLERGIES: ICD-10-CM

## 2018-09-20 RX ORDER — MONTELUKAST SODIUM 10 MG/1
TABLET ORAL
Qty: 30 TABLET | Refills: 0 | Status: SHIPPED | OUTPATIENT
Start: 2018-09-20 | End: 2018-10-19 | Stop reason: SDUPTHER

## 2018-09-20 RX ORDER — CITALOPRAM 20 MG/1
TABLET ORAL
Qty: 30 TABLET | Refills: 0 | Status: SHIPPED | OUTPATIENT
Start: 2018-09-20 | End: 2018-10-19 | Stop reason: SDUPTHER

## 2018-10-08 ENCOUNTER — OFFICE VISIT (OUTPATIENT)
Dept: FAMILY MEDICINE CLINIC | Facility: CLINIC | Age: 53
End: 2018-10-08
Payer: COMMERCIAL

## 2018-10-08 VITALS
TEMPERATURE: 97.8 F | RESPIRATION RATE: 16 BRPM | WEIGHT: 192 LBS | HEIGHT: 68 IN | BODY MASS INDEX: 29.1 KG/M2 | HEART RATE: 64 BPM | DIASTOLIC BLOOD PRESSURE: 90 MMHG | SYSTOLIC BLOOD PRESSURE: 126 MMHG

## 2018-10-08 DIAGNOSIS — R30.0 DYSURIA: ICD-10-CM

## 2018-10-08 DIAGNOSIS — Z12.39 BREAST CANCER SCREENING: ICD-10-CM

## 2018-10-08 DIAGNOSIS — R31.9 HEMATURIA, UNSPECIFIED TYPE: ICD-10-CM

## 2018-10-08 DIAGNOSIS — E04.9 NONTOXIC GOITER, UNSPECIFIED: ICD-10-CM

## 2018-10-08 DIAGNOSIS — Z23 NEED FOR INFLUENZA VACCINATION: ICD-10-CM

## 2018-10-08 DIAGNOSIS — E78.5 HYPERLIPIDEMIA, UNSPECIFIED HYPERLIPIDEMIA TYPE: ICD-10-CM

## 2018-10-08 DIAGNOSIS — R03.0 BLOOD PRESSURE ELEVATED WITHOUT HISTORY OF HTN: ICD-10-CM

## 2018-10-08 DIAGNOSIS — Z00.00 PHYSICAL EXAM: Primary | ICD-10-CM

## 2018-10-08 PROBLEM — R73.09 ABNORMAL BLOOD SUGAR: Status: ACTIVE | Noted: 2017-09-18

## 2018-10-08 LAB
SL AMB  POCT GLUCOSE, UA: ABNORMAL
SL AMB LEUKOCYTE ESTERASE,UA: 500
SL AMB POCT BILIRUBIN,UA: ABNORMAL
SL AMB POCT BLOOD,UA: 50
SL AMB POCT CLARITY,UA: ABNORMAL
SL AMB POCT COLOR,UA: YELLOW
SL AMB POCT KETONES,UA: ABNORMAL
SL AMB POCT NITRITE,UA: ABNORMAL
SL AMB POCT PH,UA: 5
SL AMB POCT SPECIFIC GRAVITY,UA: 1.01
SL AMB POCT URINE PROTEIN: ABNORMAL
SL AMB POCT UROBILINOGEN: ABNORMAL

## 2018-10-08 PROCEDURE — 99396 PREV VISIT EST AGE 40-64: CPT | Performed by: FAMILY MEDICINE

## 2018-10-08 PROCEDURE — 3725F SCREEN DEPRESSION PERFORMED: CPT | Performed by: FAMILY MEDICINE

## 2018-10-08 PROCEDURE — 81003 URINALYSIS AUTO W/O SCOPE: CPT | Performed by: FAMILY MEDICINE

## 2018-10-08 RX ORDER — BIOTIN 1 MG
TABLET ORAL
COMMUNITY

## 2018-10-08 RX ORDER — CHLORAL HYDRATE 500 MG
CAPSULE ORAL
COMMUNITY

## 2018-10-11 LAB
BACTERIA UR CULT: NORMAL
Lab: NO GROWTH

## 2018-10-15 ENCOUNTER — TELEPHONE (OUTPATIENT)
Dept: FAMILY MEDICINE CLINIC | Facility: CLINIC | Age: 53
End: 2018-10-15

## 2018-10-19 DIAGNOSIS — F32.A ANXIETY AND DEPRESSION: ICD-10-CM

## 2018-10-19 DIAGNOSIS — Z91.09 ENVIRONMENTAL ALLERGIES: ICD-10-CM

## 2018-10-19 DIAGNOSIS — F41.9 ANXIETY AND DEPRESSION: ICD-10-CM

## 2018-10-19 RX ORDER — CITALOPRAM 20 MG/1
TABLET ORAL
Qty: 30 TABLET | Refills: 0 | Status: SHIPPED | OUTPATIENT
Start: 2018-10-19 | End: 2018-11-22 | Stop reason: SDUPTHER

## 2018-10-19 RX ORDER — MONTELUKAST SODIUM 10 MG/1
TABLET ORAL
Qty: 30 TABLET | Refills: 0 | Status: SHIPPED | OUTPATIENT
Start: 2018-10-19 | End: 2020-12-11 | Stop reason: ALTCHOICE

## 2018-10-21 NOTE — PROGRESS NOTES
Chief Complaint   Patient presents with    Physical Exam        Patient ID: Maday Renee is a 46 y o  female  45 yo pt in for physical   Due for labs and mammogram   Diastolic BP borderline  Denies cp or abd pain  +dysuira, freq/urg, -UA +bld and leukos  Eye/dental care UTD  Past Medical History:   Diagnosis Date    Lyme disease 07/13/2007    Neuropathy     last assessed: 10/16/14       Past Surgical History:   Procedure Laterality Date    BREAST SURGERY      MYOMECTOMY      VARICOSE VEIN SURGERY      Ligation       Patient Active Problem List   Diagnosis    Abnormal blood sugar    Allergic asthma    Anxiety    Fatigue    Hyperlipidemia    Sleep apnea    Palpitation    Wheeze       Family History   Problem Relation Age of Onset    Heart disease Mother     Aortic aneurysm Father         abdominal    Heart disease Father     Hypertension Father     Breast cancer Sister     Alzheimer's disease Maternal Grandmother         dementia    Colon cancer Maternal Grandfather     Colon cancer Child     Heart disease Other     Breast cancer Other     Alzheimer's disease Maternal Uncle         dementia       Immunization History   Administered Date(s) Administered    Influenza Quadrivalent Preservative Free 3 years and older IM 09/18/2017    Influenza TIV (IM) 10/16/2014, 10/09/2015, 11/30/2016    Tdap 07/25/2009       Allergies   Allergen Reactions    Codeine Nausea Only     Reaction Date: [id number];  Annotation - [id number]: dizzy  /jjw       Current Outpatient Prescriptions   Medication Sig Dispense Refill    Probiotic Product (PROBIOTIC-10) CAPS Take by mouth      Calcium Carb-Cholecalciferol 600-500 MG-UNIT CAPS Take by mouth      Cholecalciferol (VITAMIN D3) 1000 units CAPS Take by mouth      citalopram (CeleXA) 20 mg tablet TAKE 1 TABLET BY MOUTH ONCE A DAY  30 tablet 0    Docosahexaenoic Acid (DHA) 200 MG CAPS Take by mouth      Magnesium 200 MG CHEW Chew      montelukast (SINGULAIR) 10 mg tablet TAKE ONE TABLET BY MOUTH ONE TIME DAILY IN THE P M   30 tablet 0    Omega-3 Fatty Acids (FISH OIL) 1,000 mg Take by mouth       No current facility-administered medications for this visit  Social History     Social History    Marital status: /Civil Union     Spouse name: N/A    Number of children: N/A    Years of education: N/A     Social History Main Topics    Smoking status: Former Smoker    Smokeless tobacco: Former User    Alcohol use Yes      Comment: occasional, social    Drug use: No    Sexual activity: Yes     Other Topics Concern    None     Social History Narrative    None       Review of Systems   Constitutional: Positive for fatigue  Negative for fever  Respiratory: Negative  Cardiovascular: Negative  Gastrointestinal: Negative  Endocrine:        Hx goiter   Genitourinary: Positive for dysuria, frequency and urgency  Musculoskeletal: Positive for arthralgias and myalgias  Allergic/Immunologic: Positive for environmental allergies  Neurological: Negative  Objective:    /90 (BP Location: Left arm, Patient Position: Sitting, Cuff Size: Large)   Pulse 64   Temp 97 8 °F (36 6 °C)   Resp 16   Ht 5' 8" (1 727 m)   Wt 87 1 kg (192 lb)   BMI 29 19 kg/m²        Physical Exam   Constitutional: She is oriented to person, place, and time  OW, NAD   HENT:   Head: Normocephalic and atraumatic  Mouth/Throat: No oropharyngeal exudate  Eyes: Conjunctivae are normal    Neck: Neck supple  Cardiovascular: Normal rate, regular rhythm, normal heart sounds and intact distal pulses  Pulmonary/Chest: Effort normal and breath sounds normal  No respiratory distress  Abdominal: Soft  Bowel sounds are normal  There is no tenderness  Musculoskeletal: Normal range of motion  Neurological: She is alert and oriented to person, place, and time  No cranial nerve deficit  Skin: Skin is warm  No rash noted     Psychiatric: She has a normal mood and affect  Nursing note and vitals reviewed  Assessment/Plan:   Diagnoses and all orders for this visit:    Physical exam    Dysuria  Comments:  abnl UA, check urine cx and cytology, inc fluids     Orders:  -     POCT urine dip auto non-scope  -     Urine culture; Future    Need for influenza vaccination  Comments:  given  Orders:  -     SYRINGE/SINGLE-DOSE VIAL: influenza vaccine, [id number], quadrivalent, 0 5 mL, preservative-free, for patients 3+ yr (FLUZONE)    Breast cancer screening  Comments:  ref for mammo given  Orders:  -     Mammo screening bilateral w 3d & cad; Future    Hyperlipidemia, unspecified hyperlipidemia type  Comments:  low chol diet, check level and thyroid fxn  Orders:  -     Amylase; Future  -     Lipid panel; Future  -     Magnesium; Future  -     TSH, 3rd generation with Free T4 reflex; Future  -     Lipase; Future    Hematuria, unspecified type  Comments:  check ua/urine cx  Orders:  -     CBC and Platelet; Future  -     UA w Reflex to Microscopic w Reflex to Culture - Clinic Collect  -     Cytology, urine; Future    Blood pressure elevated without history of HTN  Comments:  diastolic borderline high  Orders:  -     CBC and Platelet; Future  -     Comprehensive metabolic panel; Future    Nontoxic goiter, unspecified  Comments:  check TSH    Other orders  -     Calcium Carb-Cholecalciferol 600-500 MG-UNIT CAPS; Take by mouth  -     Docosahexaenoic Acid (DHA) 200 MG CAPS; Take by mouth  -     Omega-3 Fatty Acids (FISH OIL) 1,000 mg; Take by mouth  -     Magnesium 200 MG CHEW; Chew  -     Probiotic Product (PROBIOTIC-10) CAPS; Take by mouth  -     Cholecalciferol (VITAMIN D3) 1000 units CAPS;  Take by mouth          Nya Simpson MD

## 2018-11-04 LAB
APPEARANCE UR: CLEAR
BACTERIA URNS QL MICRO: NORMAL
BILIRUB UR QL STRIP: NEGATIVE
COLOR UR: YELLOW
EPI CELLS #/AREA URNS HPF: NORMAL /HPF
GLUCOSE UR QL: NEGATIVE
HGB UR QL STRIP: NEGATIVE
KETONES UR QL STRIP: NEGATIVE
LEUKOCYTE ESTERASE UR QL STRIP: NEGATIVE
MICRO URNS: NORMAL
MICRO URNS: NORMAL
MUCOUS THREADS URNS QL MICRO: PRESENT
NITRITE UR QL STRIP: NEGATIVE
PH UR STRIP: 7 [PH] (ref 5–7.5)
PROT UR QL STRIP: NEGATIVE
RBC #/AREA URNS HPF: NORMAL /HPF
SL AMB URINALYSIS REFLEX: NORMAL
SP GR UR: 1.02 (ref 1–1.03)
UROBILINOGEN UR STRIP-ACNC: 0.2 EU/DL (ref 0.2–1)
WBC #/AREA URNS HPF: NORMAL /HPF

## 2018-11-05 LAB
ALBUMIN SERPL-MCNC: 4.2 G/DL (ref 3.5–5.5)
ALBUMIN/GLOB SERPL: 1.8 {RATIO} (ref 1.2–2.2)
ALP SERPL-CCNC: 69 IU/L (ref 39–117)
ALT SERPL-CCNC: 21 IU/L (ref 0–32)
AMYLASE SERPL-CCNC: 48 U/L (ref 31–124)
AST SERPL-CCNC: 17 IU/L (ref 0–40)
BILIRUB SERPL-MCNC: 1 MG/DL (ref 0–1.2)
BUN SERPL-MCNC: 14 MG/DL (ref 6–24)
BUN/CREAT SERPL: 20 (ref 9–23)
CALCIUM SERPL-MCNC: 9.6 MG/DL (ref 8.7–10.2)
CHLORIDE SERPL-SCNC: 102 MMOL/L (ref 96–106)
CHOLEST SERPL-MCNC: 243 MG/DL (ref 100–199)
CO2 SERPL-SCNC: 24 MMOL/L (ref 20–29)
CREAT SERPL-MCNC: 0.7 MG/DL (ref 0.57–1)
ERYTHROCYTE [DISTWIDTH] IN BLOOD BY AUTOMATED COUNT: 12.5 % (ref 12.3–15.4)
GLOBULIN SER-MCNC: 2.4 G/DL (ref 1.5–4.5)
GLUCOSE SERPL-MCNC: 101 MG/DL (ref 65–99)
HCT VFR BLD AUTO: 40.8 % (ref 34–46.6)
HDLC SERPL-MCNC: 47 MG/DL
HGB BLD-MCNC: 14.3 G/DL (ref 11.1–15.9)
LABCORP COMMENT: NORMAL
LDLC SERPL CALC-MCNC: 173 MG/DL (ref 0–99)
LIPASE SERPL-CCNC: 30 U/L (ref 14–72)
MAGNESIUM SERPL-MCNC: 1.9 MG/DL (ref 1.6–2.3)
MCH RBC QN AUTO: 30.8 PG (ref 26.6–33)
MCHC RBC AUTO-ENTMCNC: 35 G/DL (ref 31.5–35.7)
MCV RBC AUTO: 88 FL (ref 79–97)
MICRODELETION SYND BLD/T FISH: NORMAL
PLATELET # BLD AUTO: 250 X10E3/UL (ref 150–379)
POTASSIUM SERPL-SCNC: 4.5 MMOL/L (ref 3.5–5.2)
PROT SERPL-MCNC: 6.6 G/DL (ref 6–8.5)
RBC # BLD AUTO: 4.65 X10E6/UL (ref 3.77–5.28)
SL AMB EGFR AFRICAN AMERICAN: 114 ML/MIN/1.73
SL AMB EGFR NON AFRICAN AMERICAN: 99 ML/MIN/1.73
SL AMB VLDL CHOLESTEROL CALC: 23 MG/DL (ref 5–40)
SODIUM SERPL-SCNC: 140 MMOL/L (ref 134–144)
TRIGL SERPL-MCNC: 115 MG/DL (ref 0–149)
TSH SERPL DL<=0.005 MIU/L-ACNC: 2.36 UIU/ML (ref 0.45–4.5)
WBC # BLD AUTO: 5.6 X10E3/UL (ref 3.4–10.8)

## 2018-11-06 LAB
COUNSELING NOTE: NORMAL
CYTOLOGIST CVX/VAG CYTO: NORMAL
DX ICD CODE: NORMAL
PATH REPORT.FINAL DX SPEC: NORMAL
PATH REPORT.GROSS SPEC: NORMAL
PATH REPORT.SITE OF ORIGIN SPEC: NORMAL
PATHOLOGIST NAME: NORMAL

## 2018-11-22 DIAGNOSIS — F32.A ANXIETY AND DEPRESSION: ICD-10-CM

## 2018-11-22 DIAGNOSIS — F41.9 ANXIETY AND DEPRESSION: ICD-10-CM

## 2018-11-22 RX ORDER — CITALOPRAM 20 MG/1
TABLET ORAL
Qty: 30 TABLET | Refills: 0 | Status: SHIPPED | OUTPATIENT
Start: 2018-11-22 | End: 2018-12-26 | Stop reason: SDUPTHER

## 2018-12-26 DIAGNOSIS — F32.A ANXIETY AND DEPRESSION: ICD-10-CM

## 2018-12-26 DIAGNOSIS — F41.9 ANXIETY AND DEPRESSION: ICD-10-CM

## 2018-12-26 RX ORDER — CITALOPRAM 20 MG/1
20 TABLET ORAL DAILY
Qty: 30 TABLET | Refills: 0 | Status: SHIPPED | OUTPATIENT
Start: 2018-12-26 | End: 2019-01-22 | Stop reason: SDUPTHER

## 2019-01-12 DIAGNOSIS — Z12.39 BREAST CANCER SCREENING: ICD-10-CM

## 2019-01-22 DIAGNOSIS — F32.A ANXIETY AND DEPRESSION: ICD-10-CM

## 2019-01-22 DIAGNOSIS — F41.9 ANXIETY AND DEPRESSION: ICD-10-CM

## 2019-01-22 RX ORDER — CITALOPRAM 20 MG/1
TABLET ORAL
Qty: 90 TABLET | Refills: 1 | Status: SHIPPED | OUTPATIENT
Start: 2019-01-22 | End: 2019-07-10 | Stop reason: SDUPTHER

## 2019-07-10 DIAGNOSIS — F41.9 ANXIETY AND DEPRESSION: ICD-10-CM

## 2019-07-10 DIAGNOSIS — F32.A ANXIETY AND DEPRESSION: ICD-10-CM

## 2019-07-10 RX ORDER — CITALOPRAM 20 MG/1
TABLET ORAL
Qty: 30 TABLET | Refills: 5 | Status: SHIPPED | OUTPATIENT
Start: 2019-07-10 | End: 2019-12-23 | Stop reason: SDUPTHER

## 2019-11-20 ENCOUNTER — OFFICE VISIT (OUTPATIENT)
Dept: FAMILY MEDICINE CLINIC | Facility: CLINIC | Age: 54
End: 2019-11-20
Payer: COMMERCIAL

## 2019-11-20 VITALS
HEART RATE: 80 BPM | DIASTOLIC BLOOD PRESSURE: 78 MMHG | SYSTOLIC BLOOD PRESSURE: 122 MMHG | HEIGHT: 68 IN | RESPIRATION RATE: 14 BRPM | BODY MASS INDEX: 29.86 KG/M2 | TEMPERATURE: 98.2 F | WEIGHT: 197 LBS

## 2019-11-20 DIAGNOSIS — H11.31 SUBCONJUNCTIVAL HEMORRHAGE OF RIGHT EYE: ICD-10-CM

## 2019-11-20 DIAGNOSIS — R30.0 DYSURIA: ICD-10-CM

## 2019-11-20 DIAGNOSIS — E01.0 THYROMEGALY: ICD-10-CM

## 2019-11-20 DIAGNOSIS — Z12.4 CERVICAL CANCER SCREENING: ICD-10-CM

## 2019-11-20 DIAGNOSIS — E78.5 HYPERLIPIDEMIA, UNSPECIFIED HYPERLIPIDEMIA TYPE: ICD-10-CM

## 2019-11-20 DIAGNOSIS — Z00.00 PHYSICAL EXAM: Primary | ICD-10-CM

## 2019-11-20 DIAGNOSIS — Z12.39 BREAST CANCER SCREENING: ICD-10-CM

## 2019-11-20 LAB
SL AMB  POCT GLUCOSE, UA: ABNORMAL
SL AMB LEUKOCYTE ESTERASE,UA: 500
SL AMB POCT BILIRUBIN,UA: ABNORMAL
SL AMB POCT BLOOD,UA: ABNORMAL
SL AMB POCT CLARITY,UA: CLEAR
SL AMB POCT COLOR,UA: YELLOW
SL AMB POCT KETONES,UA: ABNORMAL
SL AMB POCT NITRITE,UA: ABNORMAL
SL AMB POCT PH,UA: 7
SL AMB POCT SPECIFIC GRAVITY,UA: 1.01
SL AMB POCT URINE PROTEIN: ABNORMAL
SL AMB POCT UROBILINOGEN: ABNORMAL

## 2019-11-20 PROCEDURE — 81003 URINALYSIS AUTO W/O SCOPE: CPT | Performed by: FAMILY MEDICINE

## 2019-11-20 PROCEDURE — 99396 PREV VISIT EST AGE 40-64: CPT | Performed by: FAMILY MEDICINE

## 2019-11-20 RX ORDER — ASPIRIN 81 MG/1
81 TABLET, CHEWABLE ORAL DAILY
COMMUNITY
End: 2021-06-02

## 2019-11-22 LAB
BACTERIA UR CULT: NORMAL
CYTOLOGIST CVX/VAG CYTO: NORMAL
DX ICD CODE: NORMAL
HPV I/H RISK 1 DNA CVX QL PROBE+SIG AMP: NEGATIVE
HPV LOW RISK DNA CVX QL PROBE+SIG AMP: NEGATIVE
Lab: NO GROWTH
OTHER STN SPEC: NORMAL
OTHER STN SPEC: NORMAL
PATH REPORT.FINAL DX SPEC: NORMAL
SL AMB NOTE:: NORMAL
SL AMB SPECIMEN ADEQUACY: NORMAL

## 2019-11-23 ENCOUNTER — TELEPHONE (OUTPATIENT)
Dept: FAMILY MEDICINE CLINIC | Facility: CLINIC | Age: 54
End: 2019-11-23

## 2019-11-26 LAB — HBA1C MFR BLD HPLC: 5.3 %

## 2019-11-27 LAB
ALBUMIN SERPL-MCNC: 4.3 G/DL (ref 3.5–5.5)
ALBUMIN/GLOB SERPL: 2 {RATIO} (ref 1.2–2.2)
ALP SERPL-CCNC: 62 IU/L (ref 39–117)
ALT SERPL-CCNC: 18 IU/L (ref 0–32)
AMYLASE SERPL-CCNC: 44 U/L (ref 31–124)
AST SERPL-CCNC: 14 IU/L (ref 0–40)
BASOPHILS # BLD AUTO: 0.1 X10E3/UL (ref 0–0.2)
BASOPHILS NFR BLD AUTO: 2 %
BILIRUB SERPL-MCNC: 1.4 MG/DL (ref 0–1.2)
BUN SERPL-MCNC: 12 MG/DL (ref 6–24)
BUN/CREAT SERPL: 17 (ref 9–23)
CALCIUM SERPL-MCNC: 9.5 MG/DL (ref 8.7–10.2)
CHLORIDE SERPL-SCNC: 100 MMOL/L (ref 96–106)
CHOLEST SERPL-MCNC: 235 MG/DL (ref 100–199)
CO2 SERPL-SCNC: 24 MMOL/L (ref 20–29)
CREAT SERPL-MCNC: 0.7 MG/DL (ref 0.57–1)
EOSINOPHIL # BLD AUTO: 0.2 X10E3/UL (ref 0–0.4)
EOSINOPHIL NFR BLD AUTO: 4 %
ERYTHROCYTE [DISTWIDTH] IN BLOOD BY AUTOMATED COUNT: 13.3 % (ref 12.3–15.4)
ERYTHROCYTE [SEDIMENTATION RATE] IN BLOOD BY WESTERGREN METHOD: 2 MM/HR (ref 0–40)
GLOBULIN SER-MCNC: 2.1 G/DL (ref 1.5–4.5)
GLUCOSE SERPL-MCNC: 107 MG/DL (ref 65–99)
HBA1C MFR BLD: 5.3 % (ref 4.8–5.6)
HCT VFR BLD AUTO: 42.3 % (ref 34–46.6)
HDLC SERPL-MCNC: 42 MG/DL
HGB BLD-MCNC: 14.1 G/DL (ref 11.1–15.9)
IMM GRANULOCYTES # BLD: 0 X10E3/UL (ref 0–0.1)
IMM GRANULOCYTES NFR BLD: 0 %
LDLC SERPL CALC-MCNC: 157 MG/DL (ref 0–99)
LIPASE SERPL-CCNC: 25 U/L (ref 14–72)
LYMPHOCYTES # BLD AUTO: 2.1 X10E3/UL (ref 0.7–3.1)
LYMPHOCYTES NFR BLD AUTO: 43 %
MAGNESIUM SERPL-MCNC: 2 MG/DL (ref 1.6–2.3)
MCH RBC QN AUTO: 30.3 PG (ref 26.6–33)
MCHC RBC AUTO-ENTMCNC: 33.3 G/DL (ref 31.5–35.7)
MCV RBC AUTO: 91 FL (ref 79–97)
MONOCYTES # BLD AUTO: 0.4 X10E3/UL (ref 0.1–0.9)
MONOCYTES NFR BLD AUTO: 7 %
NEUTROPHILS # BLD AUTO: 2.2 X10E3/UL (ref 1.4–7)
NEUTROPHILS NFR BLD AUTO: 44 %
PLATELET # BLD AUTO: 260 X10E3/UL (ref 150–450)
POTASSIUM SERPL-SCNC: 3.7 MMOL/L (ref 3.5–5.2)
PROT SERPL-MCNC: 6.4 G/DL (ref 6–8.5)
RBC # BLD AUTO: 4.65 X10E6/UL (ref 3.77–5.28)
SL AMB EGFR AFRICAN AMERICAN: 113 ML/MIN/1.73
SL AMB EGFR NON AFRICAN AMERICAN: 98 ML/MIN/1.73
SODIUM SERPL-SCNC: 139 MMOL/L (ref 134–144)
TRIGL SERPL-MCNC: 178 MG/DL (ref 0–149)
TSH SERPL DL<=0.005 MIU/L-ACNC: 2.68 UIU/ML (ref 0.45–4.5)
WBC # BLD AUTO: 5 X10E3/UL (ref 3.4–10.8)

## 2019-12-02 ENCOUNTER — TELEPHONE (OUTPATIENT)
Dept: FAMILY MEDICINE CLINIC | Facility: CLINIC | Age: 54
End: 2019-12-02

## 2019-12-02 NOTE — TELEPHONE ENCOUNTER
Patient does not want to start crestor she is working on loosing weight   Is there anything else she can do naturally?  Please advise tc/cma----- Message from Joshua Cota MD sent at 12/2/2019 11:46 AM EST -----  Please inform labs overall good except for cholesterol still high--rec starting low dose Crestor 5mg daily---if pt agreeable let [facility] pharmacy she would like sent to-

## 2019-12-23 DIAGNOSIS — F32.A ANXIETY AND DEPRESSION: ICD-10-CM

## 2019-12-23 DIAGNOSIS — F41.9 ANXIETY AND DEPRESSION: ICD-10-CM

## 2019-12-24 RX ORDER — CITALOPRAM 20 MG/1
20 TABLET ORAL DAILY
Qty: 30 TABLET | Refills: 5 | Status: SHIPPED | OUTPATIENT
Start: 2019-12-24 | End: 2020-07-01

## 2020-01-16 ENCOUNTER — TELEPHONE (OUTPATIENT)
Dept: FAMILY MEDICINE CLINIC | Facility: CLINIC | Age: 55
End: 2020-01-16

## 2020-01-16 DIAGNOSIS — Z12.39 BREAST CANCER SCREENING: ICD-10-CM

## 2020-01-16 NOTE — TELEPHONE ENCOUNTER
----- Message from Kathi Hidalgo MD sent at 1/16/2020 11:52 AM EST -----  Please mail pt copy of mammogram report-

## 2020-02-27 ENCOUNTER — HOSPITAL ENCOUNTER (OUTPATIENT)
Dept: RADIOLOGY | Facility: CLINIC | Age: 55
Discharge: HOME | End: 2020-02-27
Payer: COMMERCIAL

## 2020-02-27 ENCOUNTER — TRANSCRIBE ORDERS (OUTPATIENT)
Dept: RADIOLOGY | Facility: CLINIC | Age: 55
End: 2020-02-27

## 2020-02-27 DIAGNOSIS — E01.0 THYROMEGALY: ICD-10-CM

## 2020-02-27 PROCEDURE — 76536 US EXAM OF HEAD AND NECK: CPT

## 2020-05-26 ENCOUNTER — TELEPHONE (OUTPATIENT)
Dept: FAMILY MEDICINE CLINIC | Facility: CLINIC | Age: 55
End: 2020-05-26

## 2020-05-26 ENCOUNTER — TELEMEDICINE (OUTPATIENT)
Dept: FAMILY MEDICINE CLINIC | Facility: CLINIC | Age: 55
End: 2020-05-26
Payer: COMMERCIAL

## 2020-05-26 DIAGNOSIS — H00.014 HORDEOLUM EXTERNUM OF LEFT UPPER EYELID: Primary | ICD-10-CM

## 2020-05-26 PROCEDURE — 99214 OFFICE O/P EST MOD 30 MIN: CPT | Performed by: FAMILY MEDICINE

## 2020-05-26 RX ORDER — ERYTHROMYCIN 5 MG/G
0.5 OINTMENT OPHTHALMIC EVERY 8 HOURS SCHEDULED
Qty: 3.5 G | Refills: 1 | Status: SHIPPED | OUTPATIENT
Start: 2020-05-26 | End: 2021-10-21 | Stop reason: ALTCHOICE

## 2020-05-30 PROBLEM — H00.014 HORDEOLUM EXTERNUM OF LEFT UPPER EYELID: Status: ACTIVE | Noted: 2020-05-30

## 2020-07-01 DIAGNOSIS — F32.A ANXIETY AND DEPRESSION: ICD-10-CM

## 2020-07-01 DIAGNOSIS — F41.9 ANXIETY AND DEPRESSION: ICD-10-CM

## 2020-07-01 RX ORDER — CITALOPRAM 20 MG/1
TABLET ORAL
Qty: 30 TABLET | Refills: 5 | Status: SHIPPED | OUTPATIENT
Start: 2020-07-01 | End: 2021-01-03

## 2020-10-04 LAB — HBA1C MFR BLD HPLC: 5.3 %

## 2020-12-11 ENCOUNTER — OFFICE VISIT (OUTPATIENT)
Dept: FAMILY MEDICINE CLINIC | Facility: CLINIC | Age: 55
End: 2020-12-11
Payer: COMMERCIAL

## 2020-12-11 VITALS
SYSTOLIC BLOOD PRESSURE: 128 MMHG | BODY MASS INDEX: 28.85 KG/M2 | TEMPERATURE: 96.7 F | HEART RATE: 64 BPM | RESPIRATION RATE: 14 BRPM | WEIGHT: 194.8 LBS | DIASTOLIC BLOOD PRESSURE: 90 MMHG | HEIGHT: 69 IN

## 2020-12-11 DIAGNOSIS — I10 ESSENTIAL HYPERTENSION: ICD-10-CM

## 2020-12-11 DIAGNOSIS — R10.13 EPIGASTRIC PAIN: Primary | ICD-10-CM

## 2020-12-11 LAB
SL AMB  POCT GLUCOSE, UA: NORMAL
SL AMB LEUKOCYTE ESTERASE,UA: NORMAL
SL AMB POCT BILIRUBIN,UA: NORMAL
SL AMB POCT BLOOD,UA: NORMAL
SL AMB POCT CLARITY,UA: CLEAR
SL AMB POCT COLOR,UA: YELLOW
SL AMB POCT KETONES,UA: NORMAL
SL AMB POCT NITRITE,UA: NORMAL
SL AMB POCT PH,UA: 7
SL AMB POCT SPECIFIC GRAVITY,UA: 1.01
SL AMB POCT URINE PROTEIN: NORMAL
SL AMB POCT UROBILINOGEN: NORMAL

## 2020-12-11 PROCEDURE — 81003 URINALYSIS AUTO W/O SCOPE: CPT | Performed by: FAMILY MEDICINE

## 2020-12-11 PROCEDURE — 3074F SYST BP LT 130 MM HG: CPT | Performed by: FAMILY MEDICINE

## 2020-12-11 PROCEDURE — 3080F DIAST BP >= 90 MM HG: CPT | Performed by: FAMILY MEDICINE

## 2020-12-11 PROCEDURE — 99214 OFFICE O/P EST MOD 30 MIN: CPT | Performed by: FAMILY MEDICINE

## 2020-12-11 PROCEDURE — 1036F TOBACCO NON-USER: CPT | Performed by: FAMILY MEDICINE

## 2020-12-11 PROCEDURE — 3725F SCREEN DEPRESSION PERFORMED: CPT | Performed by: FAMILY MEDICINE

## 2020-12-11 PROCEDURE — 3008F BODY MASS INDEX DOCD: CPT | Performed by: FAMILY MEDICINE

## 2020-12-11 RX ORDER — RIBOFLAVIN (VITAMIN B2) 100 MG
100 TABLET ORAL DAILY
COMMUNITY
End: 2021-06-02

## 2020-12-11 RX ORDER — PANTOPRAZOLE SODIUM 40 MG/1
40 TABLET, DELAYED RELEASE ORAL DAILY
Qty: 30 TABLET | Refills: 2 | Status: SHIPPED | OUTPATIENT
Start: 2020-12-11 | End: 2021-01-18 | Stop reason: SDUPTHER

## 2020-12-11 RX ORDER — METOPROLOL SUCCINATE 25 MG/1
25 TABLET, EXTENDED RELEASE ORAL DAILY
Qty: 30 TABLET | Refills: 2 | Status: SHIPPED | OUTPATIENT
Start: 2020-12-11 | End: 2020-12-28 | Stop reason: SDUPTHER

## 2020-12-14 PROBLEM — I10 ESSENTIAL HYPERTENSION: Status: ACTIVE | Noted: 2020-12-14

## 2020-12-14 PROBLEM — R10.13 EPIGASTRIC PAIN: Status: ACTIVE | Noted: 2020-12-14

## 2020-12-28 DIAGNOSIS — I10 ESSENTIAL HYPERTENSION: ICD-10-CM

## 2020-12-28 RX ORDER — METOPROLOL SUCCINATE 25 MG/1
25 TABLET, EXTENDED RELEASE ORAL DAILY
Qty: 30 TABLET | Refills: 2 | Status: SHIPPED | OUTPATIENT
Start: 2020-12-28 | End: 2021-05-26 | Stop reason: SDUPTHER

## 2021-01-03 DIAGNOSIS — F32.A ANXIETY AND DEPRESSION: ICD-10-CM

## 2021-01-03 DIAGNOSIS — F41.9 ANXIETY AND DEPRESSION: ICD-10-CM

## 2021-01-03 RX ORDER — CITALOPRAM 20 MG/1
TABLET ORAL
Qty: 30 TABLET | Refills: 5 | Status: SHIPPED | OUTPATIENT
Start: 2021-01-03 | End: 2021-08-06

## 2021-01-18 DIAGNOSIS — R10.13 EPIGASTRIC PAIN: Primary | ICD-10-CM

## 2021-01-18 DIAGNOSIS — I10 ESSENTIAL HYPERTENSION: ICD-10-CM

## 2021-01-18 RX ORDER — PANTOPRAZOLE SODIUM 40 MG/1
40 TABLET, DELAYED RELEASE ORAL DAILY
Qty: 30 TABLET | Refills: 0 | Status: SHIPPED | OUTPATIENT
Start: 2021-01-18 | End: 2021-02-12 | Stop reason: SDUPTHER

## 2021-01-21 ENCOUNTER — HOSPITAL ENCOUNTER (OUTPATIENT)
Dept: RADIOLOGY | Facility: HOSPITAL | Age: 56
Discharge: HOME | End: 2021-01-21
Attending: FAMILY MEDICINE
Payer: COMMERCIAL

## 2021-01-21 DIAGNOSIS — R10.13 EPIGASTRIC PAIN: ICD-10-CM

## 2021-01-21 DIAGNOSIS — I10 ESSENTIAL HYPERTENSION: ICD-10-CM

## 2021-01-21 PROCEDURE — 76700 US EXAM ABDOM COMPLETE: CPT

## 2021-01-25 ENCOUNTER — TELEPHONE (OUTPATIENT)
Dept: FAMILY MEDICINE CLINIC | Facility: CLINIC | Age: 56
End: 2021-01-25

## 2021-01-25 NOTE — TELEPHONE ENCOUNTER
Reviewed --pt w hx of [place] Olp synd (not [place]) as in last message  Rocío Olp synd (increased bilirubin) may contribute to but can be other causes for the liver/GB changes so pt should still see GI doctor

## 2021-01-25 NOTE — TELEPHONE ENCOUNTER
Pt notified and wanted to remind you she has Guillian barre disease and is not sure if that can be related to the liver disease  She has not made the appt but I gave her the info to schedule tc/cma----- Message from Juan Jose Haas MD sent at 1/24/2021 10:28 PM EST -----  Please inform abdominal u/s shows some fatty changes in liver and some gallbladder wall thickening that may reflect liver disease    When we last spoke I referred her to GI--Dr Chaves/Tang/Ethel--please find out if she has made appt-thanks

## 2021-02-12 ENCOUNTER — OFFICE VISIT (OUTPATIENT)
Dept: GASTROENTEROLOGY | Facility: CLINIC | Age: 56
End: 2021-02-12
Payer: COMMERCIAL

## 2021-02-12 VITALS
TEMPERATURE: 96.6 F | DIASTOLIC BLOOD PRESSURE: 87 MMHG | WEIGHT: 195.6 LBS | HEART RATE: 63 BPM | SYSTOLIC BLOOD PRESSURE: 131 MMHG | HEIGHT: 69 IN | BODY MASS INDEX: 28.97 KG/M2

## 2021-02-12 DIAGNOSIS — R10.13 EPIGASTRIC PAIN: Primary | ICD-10-CM

## 2021-02-12 DIAGNOSIS — K21.9 GASTROESOPHAGEAL REFLUX DISEASE WITHOUT ESOPHAGITIS: ICD-10-CM

## 2021-02-12 PROCEDURE — 99244 OFF/OP CNSLTJ NEW/EST MOD 40: CPT | Performed by: INTERNAL MEDICINE

## 2021-02-12 PROCEDURE — 3008F BODY MASS INDEX DOCD: CPT | Performed by: INTERNAL MEDICINE

## 2021-02-12 PROCEDURE — 1036F TOBACCO NON-USER: CPT | Performed by: INTERNAL MEDICINE

## 2021-02-12 RX ORDER — PANTOPRAZOLE SODIUM 40 MG/1
40 TABLET, DELAYED RELEASE ORAL DAILY
Qty: 30 TABLET | Refills: 3 | Status: SHIPPED | OUTPATIENT
Start: 2021-02-12 | End: 2021-06-02

## 2021-02-12 NOTE — LETTER
February 12, 2021     Mary Wooddagoberto, [address]    Patient: Jerrod Blue   YOB: 1965   Date of Visit: 2/12/2021       Dear Dr Ac Marlow: Thank you for referring Nicole Rivera to me for evaluation  Below are my notes for this consultation  If you have questions, please do not hesitate to call me  I look forward to following your patient along with you  Sincerely,        Nia Nguyen MD        CC: No Recipients  Nia Nguyen MD  2/12/2021 12:57 PM  Sign when Signing Visit  Consultation - [facility]  Jerrod Blue 63 y o  female MRN: [id number]          Assessment & Plan:     67-year-old female with 2 and half to 3 months of epigastric abdominal pain, response to PPI therapy  1  Epigastric pain/ dyspepsia: Peptic ulcer disease versus reflux esophagitis on the differential   - we will check H pylori stool antigen   -continue PPI therapy for the next 2 months, if symptoms are improved then would proceed with endoscopic evaluation, will have her follow-up to re-evaluate   - she was instructed to give us call with any change or worsening symptoms, she had a recent ultrasound which was negative for cholelithiasis, there is some mild nodularity of her liver  2  Mildly nodular liver with some steatosis: Likely secondary to alcohol consumption, but drank more heavily in the past   - discussed with patient reports to minimize her alcohol consumption  - LFTs including platelet count, AST and ALT are normal            _____________________________________________________________        CC:   Epigastric abdominal pain    HPI:  eJrrod Blue is a 63 y  o female who was referred for evaluation of   Epigastric abdominal pain  This is a 67-year-old female, reports about 2 and half months of abdominal pain and discomfort in her periumbilical area, mild radiation to her left lower quadrant pain    She was initially started on PPI therapy back in December with significant improvement in symptoms, after 1 month she stops medications, through the new year she has had recurrence of symptoms  Again more epigastric pain, now radiating to retrosternal area with some regurgitation and nausea  She has been back on PPI therapy and gradually improving but notes that with certain foods she has exacerbation of symptoms  She denies any change in bowel movements, denies any diarrhea, constipation, melena, rectal bleeding  She had a colonoscopy in 2019 which was relatively unremarkable  Patient denies any significant NSAID use  Medical history is notable for hypertension, anxiety  Surgical history is notable for breast augmentation, varicose vein stripping  Patient denies any tobacco   She drinks about 1 bottle week of wine  She works as a [employer]  Family history is notable for sister with breast cancer, maternal grandfather colon cancer  ROS:  The remainder of the ROS was negative except for the pertinent positives mentioned in HPI           Allergies: Codeine    Medications:   Current Outpatient Medications:     Calcium Carb-Cholecalciferol 600-500 MG-UNIT CAPS, Take by mouth, Disp: , Rfl:     Cholecalciferol (VITAMIN D3) 1000 units CAPS, Take by mouth, Disp: , Rfl:     citalopram (CeleXA) 20 mg tablet, TAKE 1 TABLET BY MOUTH EVERY DAY, Disp: 30 tablet, Rfl: 5    Docosahexaenoic Acid (DHA) 200 MG CAPS, Take by mouth, Disp: , Rfl:     erythromycin (ILOTYCIN) ophthalmic ointment, Administer 0 5 inches into the left eye every 8 (eight) hours X 7 days, Disp: 3 5 g, Rfl: 1    Magnesium 200 MG CHEW, Chew, Disp: , Rfl:     metoprolol succinate (TOPROL-XL) 25 mg 24 hr tablet, Take 1 tablet (25 mg total) by mouth daily, Disp: 30 tablet, Rfl: 2    Omega-3 Fatty Acids (FISH OIL) 1,000 mg, Take by mouth, Disp: , Rfl:     pantoprazole (PROTONIX) 40 mg tablet, Take 1 tablet (40 mg total) by mouth daily, Disp: 30 tablet, Rfl: 0    Probiotic Product (PROBIOTIC-10) CAPS, Take by mouth, Disp: , Rfl:     Ascorbic Acid (vitamin C) 100 MG tablet, Take 100 mg by mouth daily, Disp: , Rfl:     aspirin 81 mg chewable tablet, Chew 81 mg daily, Disp: , Rfl: '    Past Medical History:   Diagnosis Date    Lyme disease 07/13/2007    Neuropathy     last assessed: 10/16/14       Past Surgical History:   Procedure Laterality Date    BREAST SURGERY      MYOMECTOMY      SKIN BIOPSY      SCC, BCC    VARICOSE VEIN SURGERY      Ligation       Family History   Problem Relation Age of Onset    Heart disease Mother     Heart disease Father     Hypertension Father     Breast cancer Sister     Alzheimer's disease Maternal Grandmother         dementia    Breast cancer Maternal Grandmother     Colon cancer Maternal Grandfather     Heart disease Other     Breast cancer Other     Alzheimer's disease Maternal Uncle         dementia        reports that she has quit smoking  She has quit using smokeless tobacco  She reports current alcohol use  She reports that she does not use drugs            Physical Exam:     /87   Pulse 63   Temp (!) 96 6 °F (35 9 °C)   Ht 5' 9" (1 753 m)   Wt 88 7 kg (195 lb 9 6 oz)   BMI 28 89 kg/m²     Gen: wn/wd, NAD, healthy-appearing female  HEENT: anicteric, MMM, no cervical LAD  CVS: RRR, no m/r/g  CHEST: CTA b/l  ABD: +BS, soft, NT,ND, no hepatosplenomegaly  EXT: no c/c/e  NEURO: aaox3  SKIN: NO rashes

## 2021-02-12 NOTE — PROGRESS NOTES
Consultation - [facility]  Chelsea Curry 63 y o  female MRN: [id number]          Assessment & Plan:     25-year-old female with 2 and half to 3 months of epigastric abdominal pain, response to PPI therapy  1  Epigastric pain/ dyspepsia: Peptic ulcer disease versus reflux esophagitis on the differential   - we will check H pylori stool antigen   -continue PPI therapy for the next 2 months, if symptoms are improved then would proceed with endoscopic evaluation, will have her follow-up to re-evaluate   - she was instructed to give us call with any change or worsening symptoms, she had a recent ultrasound which was negative for cholelithiasis, there is some mild nodularity of her liver  2  Mildly nodular liver with some steatosis: Likely secondary to alcohol consumption, but drank more heavily in the past   - discussed with patient reports to minimize her alcohol consumption  - LFTs including platelet count, AST and ALT are normal            _____________________________________________________________        CC:   Epigastric abdominal pain    HPI:  Chelsea Curry is a 63 y  o female who was referred for evaluation of   Epigastric abdominal pain  This is a 25-year-old female, reports about 2 and half months of abdominal pain and discomfort in her periumbilical area, mild radiation to her left lower quadrant pain  She was initially started on PPI therapy back in December with significant improvement in symptoms, after 1 month she stops medications, through the new year she has had recurrence of symptoms  Again more epigastric pain, now radiating to retrosternal area with some regurgitation and nausea  She has been back on PPI therapy and gradually improving but notes that with certain foods she has exacerbation of symptoms  She denies any change in bowel movements, denies any diarrhea, constipation, melena, rectal bleeding    She had a colonoscopy in 2019 which was relatively unremarkable  Patient denies any significant NSAID use  Medical history is notable for hypertension, anxiety  Surgical history is notable for breast augmentation, varicose vein stripping  Patient denies any tobacco   She drinks about 1 bottle week of wine  She works as a [employer]  Family history is notable for sister with breast cancer, maternal grandfather colon cancer  ROS:  The remainder of the ROS was negative except for the pertinent positives mentioned in HPI           Allergies: Codeine    Medications:   Current Outpatient Medications:     Calcium Carb-Cholecalciferol 600-500 MG-UNIT CAPS, Take by mouth, Disp: , Rfl:     Cholecalciferol (VITAMIN D3) 1000 units CAPS, Take by mouth, Disp: , Rfl:     citalopram (CeleXA) 20 mg tablet, TAKE 1 TABLET BY MOUTH EVERY DAY, Disp: 30 tablet, Rfl: 5    Docosahexaenoic Acid (DHA) 200 MG CAPS, Take by mouth, Disp: , Rfl:     erythromycin (ILOTYCIN) ophthalmic ointment, Administer 0 5 inches into the left eye every 8 (eight) hours X 7 days, Disp: 3 5 g, Rfl: 1    Magnesium 200 MG CHEW, Chew, Disp: , Rfl:     metoprolol succinate (TOPROL-XL) 25 mg 24 hr tablet, Take 1 tablet (25 mg total) by mouth daily, Disp: 30 tablet, Rfl: 2    Omega-3 Fatty Acids (FISH OIL) 1,000 mg, Take by mouth, Disp: , Rfl:     pantoprazole (PROTONIX) 40 mg tablet, Take 1 tablet (40 mg total) by mouth daily, Disp: 30 tablet, Rfl: 0    Probiotic Product (PROBIOTIC-10) CAPS, Take by mouth, Disp: , Rfl:     Ascorbic Acid (vitamin C) 100 MG tablet, Take 100 mg by mouth daily, Disp: , Rfl:     aspirin 81 mg chewable tablet, Chew 81 mg daily, Disp: , Rfl: '    Past Medical History:   Diagnosis Date    Lyme disease 07/13/2007    Neuropathy     last assessed: 10/16/14       Past Surgical History:   Procedure Laterality Date    BREAST SURGERY      MYOMECTOMY      SKIN BIOPSY      SCC, BCC    VARICOSE VEIN SURGERY      Ligation       Family History Problem Relation Age of Onset    Heart disease Mother     Heart disease Father     Hypertension Father     Breast cancer Sister     Alzheimer's disease Maternal Grandmother         dementia    Breast cancer Maternal Grandmother     Colon cancer Maternal Grandfather     Heart disease Other     Breast cancer Other     Alzheimer's disease Maternal Uncle         dementia        reports that she has quit smoking  She has quit using smokeless tobacco  She reports current alcohol use  She reports that she does not use drugs            Physical Exam:     /87   Pulse 63   Temp (!) 96 6 °F (35 9 °C)   Ht 5' 9" (1 753 m)   Wt 88 7 kg (195 lb 9 6 oz)   BMI 28 89 kg/m²     Gen: wn/wd, NAD, healthy-appearing female  HEENT: anicteric, MMM, no cervical LAD  CVS: RRR, no m/r/g  CHEST: CTA b/l  ABD: +BS, soft, NT,ND, no hepatosplenomegaly  EXT: no c/c/e  NEURO: aaox3  SKIN: NO rashes

## 2021-02-17 LAB — H PYLORI AG STL QL IA: NEGATIVE

## 2021-02-22 ENCOUNTER — TELEPHONE (OUTPATIENT)
Dept: GASTROENTEROLOGY | Facility: AMBULARY SURGERY CENTER | Age: 56
End: 2021-02-22

## 2021-02-22 NOTE — TELEPHONE ENCOUNTER
----- Message from Brigid Mondragon MD sent at 2/20/2021 11:34 AM EST -----  Please inform the patient that her H pylori stool studies are negative  Please find out if her symptoms of abdominal pain continue, if so I would recommend proceeding with an endoscopy as we discussed, otherwise please have the patient follow up in 2 to 3 months, call with any questions or concerns

## 2021-04-08 DIAGNOSIS — Z23 ENCOUNTER FOR IMMUNIZATION: ICD-10-CM

## 2021-04-20 ENCOUNTER — TELEPHONE (OUTPATIENT)
Dept: GASTROENTEROLOGY | Facility: CLINIC | Age: 56
End: 2021-04-20

## 2021-04-20 NOTE — TELEPHONE ENCOUNTER
Left message to return call to reschedule appt on 4/23 to either 4/26 or 4/27   Please forward call to [place] office

## 2021-04-21 ENCOUNTER — TELEPHONE (OUTPATIENT)
Dept: GASTROENTEROLOGY | Facility: CLINIC | Age: 56
End: 2021-04-21

## 2021-04-21 NOTE — TELEPHONE ENCOUNTER
Spoke with patient to reschedule from [facility] to [facility]  She questioned why she is not seeing Dr Dagoberto Harding  I informed her, she would have had to request that visit  She made it known she only wants to see Dr Dagoberto Harding in the future

## 2021-04-26 NOTE — PROGRESS NOTES
[facility] - Outpatient Follow-up Note  Riley Campbell 61 y o  female MRN: [id number]  Encounter: [id number]          ASSESSMENT AND PLAN:     Robert Santillan is a 61year old female with epigastric pain and dyspepsia here for a follow up after weaning off of PPI  1  Epigastric pain, dyspepsia  Patient seen two months ago 2/21 with symptoms of epigastric pain and dyspepsia  H pylori stool tests negative  Patient has been taking Protonix 40 mg daily for two months  She then weaned herself off by taking a half a pill daily for one week and then decreased to taking nothing  She reports her abdominal pain has not re-occurred, however she is noting increasing gas and belching which is how her symptoms started last time  Patient taking occasional advil for headaches  Denies dysphagia, odynophagia, weight loss, early satiety, decreased appetite, melena     -Schedule EGD for 1-2 months  Patient was advised to let us know how she is doing in 1 month  If at that time her symptoms have completely improved we will hold off on the endoscopy  If her increased belching does not subside and her epigastric pain returns we will follow up with EGD to determine further need for medications   -Avoid NSAID  Try to stick with tylenol for headaches  -Advised on anti-reflux measures including avoiding spicy foods, fatty foods, citrus, tomato based products, alcohol, smoking   -Avoid laying down 3 hours after eating       ______________________________________________________________________    SUBJECTIVE:      This is a 61year old female with PMH of asthma, HTN, HLD who presents for two month follow up of epigastric pain/periumbilical, dyspepsia  H pylori stool testing completed which was negative  She was advised to continue Protonix 40 mg daily and if symptoms do not resolve we would proceed with upper endoscopy  The patient took the medication every day for two months   She then weaned herself off by taking a half of a pill for one week and then stopping  She reports that her abdominal pain has not returned, however she has had increased belching and gas  She reports these were similar to her symptoms that occurred last time before her abdominal pain began  She reports occasional nausea, denies vomiting, dysphagia, odynophagia, weight loss, early satiety, decreased appetite and melena  She still drinks around 1 bottle of wine per weekend  She has been taking occasional advil for headaches  She states that spicy foods are her biggest trigger but she has avoided this since being off the PPI  Bowel movements are normal without constipation, diarrhea, blood in stool  Colonoscopy in 2019 with repeat recommended in 5 years due to personal history of polyps  Patient reports EGD over 20 years ago  REVIEW OF SYSTEMS IS OTHERWISE NEGATIVE        Historical Information   Past Medical History:   Diagnosis Date    Lyme disease 07/13/2007    Neuropathy     last assessed: 10/16/14     Past Surgical History:   Procedure Laterality Date    BREAST SURGERY      MYOMECTOMY      SKIN BIOPSY      SCC, BCC    VARICOSE VEIN SURGERY      Ligation     Social History   Social History     Substance and Sexual Activity   Alcohol Use Yes    Comment: occasional, social     Social History     Substance and Sexual Activity   Drug Use No     Social History     Tobacco Use   Smoking Status Former Smoker   Smokeless Tobacco Former User     Family History   Problem Relation Age of Onset    Heart disease Mother     Heart disease Father     Hypertension Father     Breast cancer Sister     Alzheimer's disease Maternal Grandmother         dementia    Breast cancer Maternal Grandmother     Colon cancer Maternal Grandfather     Heart disease Other     Breast cancer Other     Alzheimer's disease Maternal Uncle         dementia       Meds/Allergies       Current Outpatient Medications:     Ascorbic Acid (vitamin C) 100 MG tablet   aspirin 81 mg chewable tablet    Calcium Carb-Cholecalciferol 600-500 MG-UNIT CAPS    Cholecalciferol (VITAMIN D3) 1000 units CAPS    citalopram (CeleXA) 20 mg tablet    Docosahexaenoic Acid (DHA) 200 MG CAPS    erythromycin (ILOTYCIN) ophthalmic ointment    Magnesium 200 MG CHEW    metoprolol succinate (TOPROL-XL) 25 mg 24 hr tablet    Omega-3 Fatty Acids (FISH OIL) 1,000 mg    Probiotic Product (PROBIOTIC-10) CAPS    pantoprazole (PROTONIX) 40 mg tablet    Allergies   Allergen Reactions    Codeine Nausea Only     Reaction Date: [id number]; Annotation - [id number]: dizzy  /jjw           Objective     Blood pressure 122/87, pulse 56, temperature (!) 96 8 °F (36 °C), height 5' 9" (1 753 m), weight 90 7 kg (200 lb)  Body mass index is 29 53 kg/m²  PHYSICAL EXAM:      General Appearance:   Alert, cooperative, no distress   HEENT:   Normocephalic, atraumatic, anicteric      Neck:  Supple, symmetrical, trachea midline   Lungs:   Clear to auscultation bilaterally; no rales, rhonchi or wheezing; respirations unlabored    Heart[de-identified]   Regular rate and rhythm; no murmur, rub, or gallop  Abdomen:   Soft, non-tender, non-distended; normal bowel sounds; no masses, no organomegaly    Genitalia:   Deferred    Rectal:   Deferred    Extremities:  No cyanosis, clubbing or edema    Pulses:  2+ and symmetric    Skin:  No jaundice, rashes, or lesions    Lymph nodes:  No palpable cervical lymphadenopathy        Lab Results:   No visits with results within 1 Day(s) from this visit  Latest known visit with results is:   Orders Only on 02/14/2021   Component Date Value    H pylori Ag, Stl 02/14/2021 Negative          Radiology Results:   No results found

## 2021-04-27 ENCOUNTER — OFFICE VISIT (OUTPATIENT)
Dept: GASTROENTEROLOGY | Facility: CLINIC | Age: 56
End: 2021-04-27
Payer: COMMERCIAL

## 2021-04-27 VITALS
BODY MASS INDEX: 29.62 KG/M2 | WEIGHT: 200 LBS | DIASTOLIC BLOOD PRESSURE: 87 MMHG | HEIGHT: 69 IN | HEART RATE: 56 BPM | TEMPERATURE: 96.8 F | SYSTOLIC BLOOD PRESSURE: 122 MMHG

## 2021-04-27 DIAGNOSIS — R10.13 EPIGASTRIC PAIN: ICD-10-CM

## 2021-04-27 DIAGNOSIS — K21.9 GASTROESOPHAGEAL REFLUX DISEASE WITHOUT ESOPHAGITIS: Primary | ICD-10-CM

## 2021-04-27 PROCEDURE — 1036F TOBACCO NON-USER: CPT | Performed by: PHYSICIAN ASSISTANT

## 2021-04-27 PROCEDURE — 99213 OFFICE O/P EST LOW 20 MIN: CPT | Performed by: PHYSICIAN ASSISTANT

## 2021-05-26 DIAGNOSIS — I10 ESSENTIAL HYPERTENSION: ICD-10-CM

## 2021-05-26 RX ORDER — METOPROLOL SUCCINATE 25 MG/1
25 TABLET, EXTENDED RELEASE ORAL DAILY
Qty: 30 TABLET | Refills: 2 | Status: SHIPPED | OUTPATIENT
Start: 2021-05-26 | End: 2021-08-01

## 2021-05-26 NOTE — TELEPHONE ENCOUNTER
----- Message from Edward Nicole sent at 5/26/2021  1:20 PM EDT -----  Regarding: Prescription Question  Contact: [phone]  Can you please call in a refill for metoprolol succinate 25 mg 24 hr tablet at [facility] in [place]? My last order was filled by mail order but it was much more expensive than at [facility]   I would like to use [facility] from now own for this

## 2021-06-01 NOTE — RESULT NOTES
Verified Results  (1) AMYLASE [id number] 08:40AM [facility]     Test Name Result Flag Reference   Amylase, Serum 46 U/L       (1) CBC/PLT/DIFF 08Oct2016 08:40AM [facility]     Test Name Result Flag Reference   WBC 6 1 x10E3/uL  3 4-10 8   RBC 4 70 x10E6/uL  3 77-5 28   Hemoglobin 14 5 g/dL  11 1-15 9   Hematocrit 43 0 %  34 0-46  6   MCV 92 fL  79-97   MCH 30 9 pg  26 6-33 0   MCHC 33 7 g/dL  31 5-35 7   RDW 12 6 %  12 3-15 4   Platelets 231 C60Q7/MO  150-379   Neutrophils 47 %     Lymphs 36 %     Monocytes 11 %     Eos 4 %     Basos 2 %     Neutrophils (Absolute) 2 9 x10E3/uL  1 4-7 0   Lymphs (Absolute) 2 2 x10E3/uL  0 7-3 1   Monocytes(Absolute) 0 7 x10E3/uL  0 1-0 9   Eos (Absolute) 0 3 x10E3/uL  0 0-0 4   Baso (Absolute) 0 1 x10E3/uL  0 0-0 2   Immature Granulocytes 0 %     Immature Grans (Abs) 0 0 x10E3/uL  0 0-0 1     (1) COMPREHENSIVE METABOLIC PANEL [id number] 39:83EY [facility]     Test Name Result Flag Reference   Glucose, Serum 95 mg/dL  65-99   BUN 13 mg/dL  6-24   Creatinine, Serum 0 62 mg/dL  0 57-1 00   eGFR If NonAfricn Am 104 mL/min/1 73  >59   eGFR If Africn Am 120 mL/min/1 73  >59   BUN/Creatinine Ratio 21  9-23   Sodium, Serum 140 mmol/L  134-144   **Effective October 17, 2016 the reference interval**                   for Sodium, Serum will be changing to:                                                             136 - 144   Potassium, Serum 4 4 mmol/L  3 5-5 2   **Effective October 17, 2016 the reference interval**                   for Potassium, Serum will be changing to:                                          0 -  7 days        3 7 - 5 2                                          8 - 30 days        3 7 - 6 4                                          1 -  6 months      3 8 - 6 0                                   7 months -  1 year        3 8 - 5 3                                              >1 year        3 5 - 5 2   Chloride, Serum 98 mmol/L     **Effective October 17, 2016 the reference interval**                   for Chloride, Serum will be changing to:                                                              97 - 106   Carbon Dioxide, Total 23 mmol/L  18-29   Calcium, Serum 9 6 mg/dL  8 7-10 2   Protein, Total, Serum 6 6 g/dL  6 0-8 5   Albumin, Serum 4 1 g/dL  3 5-5 5   Globulin, Total 2 5 g/dL  1 5-4 5   A/G Ratio 1 6  1 1-2 5   Bilirubin, Total 1 0 mg/dL  0 0-1 2   Alkaline Phosphatase, S 88 IU/L     AST (SGOT) 58 IU/L H 0-40   ALT (SGPT) 99 IU/L H 0-32     (1) [address] [facility] 08Oct2016 08:40AM [facility]     Test Name Result Flag Reference   FSH 99 5 mIU/mL     Follicular phase        3 5 -  12 5                                     Ovulation phase         4 7 -  21 5                                     Luteal phase            1 7 -   7 7                                     Postmenopausal         25 8 - 134 8     (1) LH (LEUTINIZING HORMONE) 08Oct2016 08:40AM [facility]     Test Name Result Flag Reference   LH 16 1 mIU/mL     Follicular phase        2 4 -  12 6                                     Ovulation phase        14 0 -  95 6                                     Luteal phase            1 0 -  11 4                                     Postmenopausal          7 7 -  58 5     (1) LIPASE [id number] 08:40AM [facility]     Test Name Result Flag Reference   Lipase, Serum 32 U/L  0-59     (1) LIPID PANEL, FASTING [id number] 08:40AM [facility]     Test Name Result Flag Reference   Cholesterol, Total 215 mg/dL H 100-199   Triglycerides 137 mg/dL  0-149   HDL Cholesterol 41 mg/dL  >39   According to ATP-III Guidelines, HDL-C >59 mg/dL is considered a  negative risk factor for CHD  VLDL Cholesterol Navin 27 mg/dL  5-40   LDL Cholesterol Calc 147 mg/dL H 0-99   T  Chol/HDL Ratio 5 2 ratio units H 0 0-4 4   T   Chol/HDL Ratio                                                             Men  Women                                               1/2 Avg Risk  3 4    3 3                                                   Avg Risk  5 0    4 4                                                2X Avg  Risk  9 6    7 1                                                3X Avg  Risk 23 4   11 0     (1) MAGNESIUM 08Oct2016 08:40AM [facility]     Test Name Result Flag Reference   Magnesium, Serum 1 8 mg/dL  1 6-2 3     (1) TSH 08Oct2016 08:40AM [facility]     Test Name Result Flag Reference   TSH 2 080 uIU/mL  0 450-4 500     [facility]) Cardiovascular Risk Assessment [id number] 08:40AM [facility]     Test Name Result Flag Reference   Interpretation Note     -------------------------------  CARDIOVASCULAR REPORT:  -------------------------------  Current available clinical information suggests the  patient's risk is at least LOW  If the patient has two or  more major risk factors, the risk category is intermediate  If the patient has CHD or a CHD risk equivalent, the risk  category is high  If patient does not have CHD or a CHD risk  equivalent, consider use of the Pooled Cohort Equations to  estimate 10-year CVD risk, as individuals with greater than  7 5% risk may warrant more intensive therapy  The calculator  can be found at:  http://tools  cardiosource org/YLKKB-Mlmt-Dnmhrrocj/  -  Insulin resistance, obesity, excessive alcohol use, smoking,  nephrotic syndrome, liver disease, and certain medications  can cause secondary dyslipidemia  Consider evaluation if  clinically indicated  -  Therapeutic lifestyle changes are always valuable to achieve  optimal blood lipid status (diet, exercise, weight  management)  -------------------------------  LIPID MANAGEMENT  Select one patient risk category based upon medical history  and clinical judgment  Additional risk factors such as  personal or family history of premature CHD, smoking, and  hypertension modify a patient's goals of therapy   In CVD  prevention, the intensity of therapy should be adjusted to  the level of patient risk  MODERATE intensity statin therapy  generally results in an average LDL-C reduction of 30% to  less than 50% from the untreated baseline  Examples include  (daily doses): atorvastatin 10-20 mg, rosuvastatin 5-10 mg,  simvastatin 20-40 mg, pravastatin 40-80 mg, lovastatin 40  mg  HIGH intensity statin therapy generally results in an  average LDL-C reduction of 50% or more from the untreated  baseline  Examples include (daily doses): atorvastatin 40-80  mg and rosuvastatin 20 mg   -------------------------------  LOW RISK ASSESSMENT AND TREATMENT SUGGESTIONS  -------------------------------  LDL-C is acceptable, 147 mg/dL  Non-HDL Cholesterol is  acceptable, 174 mg/dL  -  Considerations for use of statin therapy include family  history of premature atherosclerotic disease, elevated  coronary artery calcium score, ankle-brachial index < 0 9,  elevated CRP, or elevated 10-year or lifetime CVD risk   -------------------------------  INTERMEDIATE RISK ASSESSMENT AND TREATMENT SUGGESTIONS  -------------------------------  LDL-C is borderline high, 147 mg/dL  Non-HDL Cholesterol is  borderline high, 174 mg/dL  -  Consider beginning or increasing statin  Factors that may  influence statin use include family history of premature  atherosclerotic disease, elevated coronary artery calcium  score, ankle-brachial index < 0 9, elevated CRP, or elevated  10-year or lifetime CVD risk  If statin cannot be tolerated  or increased, alternatives include use of an intestinal  agent (ezetimibe or bile acid sequestrant) or niacin   -------------------------------  HIGH RISK ASSESSMENT AND TREATMENT SUGGESTIONS  -------------------------------  LDL-C is high, 147 mg/dL  Non-HDL Cholesterol is high, 174  mg/dL  -  Begin statin  If statin already in use, consider increasing  dose to achieve at least a 50% LDL reduction from baseline  Moderate or high intensity statin is preferred   If statin  cannot be tolerated or increased, alternatives include use  of an intestinal agent (ezetimibe or bile acid sequestrant)  or niacin   -------------------------------  DISCLAIMER  These assessments and treatment suggestions are provided as  a convenience in support of the physician-patient  relationship and are not intended to replace the physician's  clinical judgment  They are derived from the national  guidelines in addition to other evidence and expert opinion  The clinician should consider this information within the  context of clinical opinion and the individual patient  SEE GUIDANCE FOR CARDIOVASCULAR REPORT: National Heart,  Lung, and Blood [place]'s Third Report of the NCEP Expert  Panel on Detection, Evaluation and Treatment of High Blood  Cholesterol in Adults (ATP III) (2002  NIH publication  ); Nel et al  Diabetes Care 2008; 31(4):811-82;  Skinny et al  Clin Chem 2009; 55(3):407-419; Colton Coelho et  al  2013 ACC/AHA guideline on the treatment of blood  cholesterol to reduce atherosclerotic cardiovascular risk in  adults: a report of the [employer] of  Cardiology/American Heart Association Task Force on Practice  Guidelines  Circulation 1010;536(KLNQK 2):S1? S45  PDF Image            Discussion/Summary   cholesterol a bit high -remainder labs within normal as well as normal chest x-ray and thyroid u/s-please call w further questions/concerns-best regards-Dr Margarita Garcia The patient is a 58y Female complaining of chest pain.

## 2021-06-02 ENCOUNTER — TELEPHONE (OUTPATIENT)
Dept: PREADMISSION TESTING | Facility: HOSPITAL | Age: 56
End: 2021-06-02

## 2021-06-02 NOTE — PRE-PROCEDURE INSTRUCTIONS
Pre-Surgery Instructions:   Medication Instructions    Calcium Carb-Cholecalciferol 600-500 MG-UNIT CAPS Patient was instructed by Physician and understands   Cholecalciferol (VITAMIN D3) 1000 units CAPS Patient was instructed by Physician and understands   citalopram (CeleXA) 20 mg tablet Patient was instructed by Physician and understands   Docosahexaenoic Acid (DHA) 200 MG CAPS Patient was instructed by Physician and understands   erythromycin (ILOTYCIN) ophthalmic ointment Patient was instructed by Physician and understands   Magnesium 200 MG CHEW Patient was instructed by Physician and understands   metoprolol succinate (TOPROL-XL) 25 mg 24 hr tablet Instructed patient per Anesthesia Guidelines   Omega-3 Fatty Acids (FISH OIL) 1,000 mg Patient was instructed by Physician and understands   Probiotic Product (PROBIOTIC-10) CAPS Patient was instructed by Physician and understands

## 2021-06-03 DIAGNOSIS — Z11.59 SCREENING FOR VIRAL DISEASE: ICD-10-CM

## 2021-06-03 PROCEDURE — U0003 INFECTIOUS AGENT DETECTION BY NUCLEIC ACID (DNA OR RNA); SEVERE ACUTE RESPIRATORY SYNDROME CORONAVIRUS 2 (SARS-COV-2) (CORONAVIRUS DISEASE [COVID-19]), AMPLIFIED PROBE TECHNIQUE, MAKING USE OF HIGH THROUGHPUT TECHNOLOGIES AS DESCRIBED BY CMS-2020-01-R: HCPCS | Performed by: INTERNAL MEDICINE

## 2021-06-03 PROCEDURE — U0005 INFEC AGEN DETEC AMPLI PROBE: HCPCS | Performed by: INTERNAL MEDICINE

## 2021-06-04 LAB — SARS-COV-2 RNA RESP QL NAA+PROBE: NEGATIVE

## 2021-06-08 ENCOUNTER — TELEPHONE (OUTPATIENT)
Dept: GASTROENTEROLOGY | Facility: AMBULARY SURGERY CENTER | Age: 56
End: 2021-06-08

## 2021-06-08 RX ORDER — SODIUM CHLORIDE, SODIUM LACTATE, POTASSIUM CHLORIDE, CALCIUM CHLORIDE 600; 310; 30; 20 MG/100ML; MG/100ML; MG/100ML; MG/100ML
100 INJECTION, SOLUTION INTRAVENOUS CONTINUOUS
Status: CANCELLED | OUTPATIENT
Start: 2021-06-08

## 2021-06-09 ENCOUNTER — ANESTHESIA EVENT (OUTPATIENT)
Dept: GASTROENTEROLOGY | Facility: AMBULARY SURGERY CENTER | Age: 56
End: 2021-06-09

## 2021-06-09 ENCOUNTER — HOSPITAL ENCOUNTER (OUTPATIENT)
Dept: GASTROENTEROLOGY | Facility: AMBULARY SURGERY CENTER | Age: 56
Setting detail: OUTPATIENT SURGERY
Discharge: HOME | End: 2021-06-09
Attending: INTERNAL MEDICINE | Admitting: INTERNAL MEDICINE
Payer: COMMERCIAL

## 2021-06-09 ENCOUNTER — ANESTHESIA (OUTPATIENT)
Dept: GASTROENTEROLOGY | Facility: AMBULARY SURGERY CENTER | Age: 56
End: 2021-06-09

## 2021-06-09 VITALS
TEMPERATURE: 96.9 F | SYSTOLIC BLOOD PRESSURE: 119 MMHG | RESPIRATION RATE: 18 BRPM | OXYGEN SATURATION: 97 % | HEART RATE: 51 BPM | DIASTOLIC BLOOD PRESSURE: 62 MMHG

## 2021-06-09 DIAGNOSIS — K21.9 GASTROESOPHAGEAL REFLUX DISEASE WITHOUT ESOPHAGITIS: ICD-10-CM

## 2021-06-09 DIAGNOSIS — R10.13 EPIGASTRIC PAIN: ICD-10-CM

## 2021-06-09 PROCEDURE — 88305 TISSUE EXAM BY PATHOLOGIST: CPT | Performed by: PATHOLOGY

## 2021-06-09 PROCEDURE — 43239 EGD BIOPSY SINGLE/MULTIPLE: CPT | Performed by: INTERNAL MEDICINE

## 2021-06-09 RX ORDER — SODIUM CHLORIDE, SODIUM LACTATE, POTASSIUM CHLORIDE, CALCIUM CHLORIDE 600; 310; 30; 20 MG/100ML; MG/100ML; MG/100ML; MG/100ML
100 INJECTION, SOLUTION INTRAVENOUS CONTINUOUS
Status: DISCONTINUED | OUTPATIENT
Start: 2021-06-09 | End: 2021-06-13 | Stop reason: HOSPADM

## 2021-06-09 RX ORDER — PROPOFOL 10 MG/ML
INJECTION, EMULSION INTRAVENOUS AS NEEDED
Status: DISCONTINUED | OUTPATIENT
Start: 2021-06-09 | End: 2021-06-09

## 2021-06-09 RX ORDER — ONDANSETRON 2 MG/ML
4 INJECTION INTRAMUSCULAR; INTRAVENOUS ONCE AS NEEDED
Status: DISCONTINUED | OUTPATIENT
Start: 2021-06-09 | End: 2021-06-13 | Stop reason: HOSPADM

## 2021-06-09 RX ORDER — PANTOPRAZOLE SODIUM 40 MG/1
40 TABLET, DELAYED RELEASE ORAL DAILY
Qty: 30 TABLET | Refills: 3 | Status: SHIPPED | OUTPATIENT
Start: 2021-06-09 | End: 2021-11-01

## 2021-06-09 RX ADMIN — PROPOFOL 50 MG: 10 INJECTION, EMULSION INTRAVENOUS at 08:09

## 2021-06-09 RX ADMIN — PROPOFOL 200 MG: 10 INJECTION, EMULSION INTRAVENOUS at 08:07

## 2021-06-09 RX ADMIN — SODIUM CHLORIDE, SODIUM LACTATE, POTASSIUM CHLORIDE, AND CALCIUM CHLORIDE 100 ML/HR: .6; .31; .03; .02 INJECTION, SOLUTION INTRAVENOUS at 07:37

## 2021-06-09 RX ADMIN — LIDOCAINE HYDROCHLORIDE 100 MG: 20 INJECTION, SOLUTION INTRAVENOUS at 08:07

## 2021-06-09 NOTE — H&P
History and Physical -  [facility]  J Luis Galaviz 61 y o  female MRN: [id number]    HPI: J Luis Galaviz is a 61y o  year old female who presents with epigastric pain  Review of Systems    Historical Information   Past Medical History:   Diagnosis Date    Anxiety     Hyperlipidemia     Hypertension     Lyme disease 2007    Neuropathy     last assessed: 10/16/14    PONV (postoperative nausea and vomiting)     Seasonal allergies     Sleep apnea     snoring     Past Surgical History:   Procedure Laterality Date    BREAST SURGERY      MYOMECTOMY      SKIN BIOPSY      SCC, BCC    VARICOSE VEIN SURGERY      Ligation     Social History   Social History     Substance and Sexual Activity   Alcohol Use Yes    Frequency: 2-4 times a month    Comment: occasional, social     Social History     Substance and Sexual Activity   Drug Use Yes    Types: Marijuana    Comment: vape      Social History     Tobacco Use   Smoking Status Former Smoker    Packs/day: 0 25    Years: 25 00    Pack years: 6 25    Quit date:     Years since quittin 4   Smokeless Tobacco Former User   Tobacco Comment    off and onsmoker     Family History   Problem Relation Age of Onset    Heart disease Mother     Heart disease Father     Hypertension Father     Breast cancer Sister     Alzheimer's disease Maternal Grandmother         dementia    Breast cancer Maternal Grandmother     Colon cancer Maternal Grandfather     Heart disease Other     Breast cancer Other     Alzheimer's disease Maternal Uncle         dementia       Meds/Allergies     (Not in a hospital admission)      Allergies   Allergen Reactions    Codeine Nausea Only     Reaction Date: ;  Annotation - [id number]: dizzy  /jjw       Objective     /70   Pulse (!) 54   Temp (!) 96 9 °F (36 1 °C) (Temporal)   Resp 16   SpO2 97%       PHYSICAL EXAM    Gen: NAD  CV: RRR  CHEST: Clear  ABD: soft, NT/ND  EXT: no edema  Neuro: AAO      ASSESSMENT/PLAN:  This is a 61y o  year old female here for epigastric pain       PLAN:   Procedure: Leighann Carpenter

## 2021-06-09 NOTE — ANESTHESIA POSTPROCEDURE EVALUATION
Post-Op Assessment Note    CV Status:  Stable       Mental Status:  Sleepy   Hydration Status:  Stable   PONV Controlled:  Controlled   Airway Patency:  Patent      Post Op Vitals Reviewed: Yes      Staff: CRNA         No complications documented      BP   114/56   Temp     Pulse 54   Resp   20   SpO2   99

## 2021-06-09 NOTE — ANESTHESIA PREPROCEDURE EVALUATION
Procedure:  EGD    Relevant Problems   ANESTHESIA (within normal limits)      CARDIO   (+) Essential hypertension   (+) Hyperlipidemia      ENDO (within normal limits)      GI/HEPATIC   (+) GERD (gastroesophageal reflux disease)      NEURO/PSYCH   (+) Anxiety      PULMONARY   (+) Allergic asthma   (+) Sleep apnea        Physical Exam    Airway    Mallampati score: II  TM Distance: <3 FB  Neck ROM: full     Dental   Comment: No loose,     Cardiovascular  Cardiovascular exam normal    Pulmonary  Pulmonary exam normal     Other Findings        Anesthesia Plan  ASA Score- 2     Anesthesia Type- IV sedation with anesthesia with ASA Monitors  Additional Monitors:   Airway Plan:           Plan Factors-Exercise tolerance (METS): >4 METS  Chart reviewed  Existing labs reviewed  Patient summary reviewed  Patient is not a current smoker  Induction- intravenous  Postoperative Plan-     Informed Consent- Anesthetic plan and risks discussed with patient  I personally reviewed this patient with the CRNA  Discussed and agreed on the Anesthesia Plan with the ALAYNA Yu

## 2021-07-14 ENCOUNTER — RA CDI HCC (OUTPATIENT)
Dept: OTHER | Facility: HOSPITAL | Age: 56
End: 2021-07-14

## 2021-07-14 NOTE — PROGRESS NOTES
[facility] 75  coding opportunities          Chart reviewed, no opportunity found: CHART REVIEWED, NO OPPORTUNITY FOUND                     Patients insurance company: [employer] (Medicare and Commercial for [employer] and [employer])

## 2021-07-16 ENCOUNTER — OFFICE VISIT (OUTPATIENT)
Dept: FAMILY MEDICINE CLINIC | Facility: CLINIC | Age: 56
End: 2021-07-16
Payer: COMMERCIAL

## 2021-07-16 VITALS
HEIGHT: 69 IN | WEIGHT: 198.8 LBS | RESPIRATION RATE: 16 BRPM | SYSTOLIC BLOOD PRESSURE: 116 MMHG | HEART RATE: 58 BPM | BODY MASS INDEX: 29.44 KG/M2 | TEMPERATURE: 97.2 F | DIASTOLIC BLOOD PRESSURE: 72 MMHG

## 2021-07-16 DIAGNOSIS — R51.9 HEADACHE DISORDER: ICD-10-CM

## 2021-07-16 DIAGNOSIS — M54.2 NECK PAIN, BILATERAL: Primary | ICD-10-CM

## 2021-07-16 PROCEDURE — 99213 OFFICE O/P EST LOW 20 MIN: CPT | Performed by: FAMILY MEDICINE

## 2021-07-16 PROCEDURE — 1036F TOBACCO NON-USER: CPT | Performed by: FAMILY MEDICINE

## 2021-07-19 ENCOUNTER — APPOINTMENT (OUTPATIENT)
Dept: RADIOLOGY | Facility: CLINIC | Age: 56
End: 2021-07-19
Payer: COMMERCIAL

## 2021-07-19 ENCOUNTER — HOSPITAL ENCOUNTER (OUTPATIENT)
Dept: RADIOLOGY | Facility: HOSPITAL | Age: 56
Discharge: HOME | End: 2021-07-19
Payer: COMMERCIAL

## 2021-07-19 DIAGNOSIS — M54.2 NECK PAIN, BILATERAL: ICD-10-CM

## 2021-07-19 DIAGNOSIS — R51.9 HEADACHE DISORDER: ICD-10-CM

## 2021-07-19 PROCEDURE — 72050 X-RAY EXAM NECK SPINE 4/5VWS: CPT

## 2021-08-01 DIAGNOSIS — I10 ESSENTIAL HYPERTENSION: ICD-10-CM

## 2021-08-01 RX ORDER — METOPROLOL SUCCINATE 25 MG/1
TABLET, EXTENDED RELEASE ORAL
Qty: 30 TABLET | Refills: 2 | Status: SHIPPED | OUTPATIENT
Start: 2021-08-01 | End: 2021-11-01

## 2021-08-05 PROBLEM — M54.2 NECK PAIN, BILATERAL: Status: ACTIVE | Noted: 2021-08-05

## 2021-08-05 PROBLEM — R51.9 HEADACHE DISORDER: Status: ACTIVE | Noted: 2021-08-05

## 2021-08-06 ENCOUNTER — TELEPHONE (OUTPATIENT)
Dept: PHYSICAL THERAPY | Facility: OTHER | Age: 56
End: 2021-08-06

## 2021-08-06 DIAGNOSIS — F41.9 ANXIETY AND DEPRESSION: ICD-10-CM

## 2021-08-06 DIAGNOSIS — F32.A ANXIETY AND DEPRESSION: ICD-10-CM

## 2021-08-06 RX ORDER — CITALOPRAM 20 MG/1
TABLET ORAL
Qty: 30 TABLET | Refills: 5 | Status: SHIPPED | OUTPATIENT
Start: 2021-08-06 | End: 2022-01-31

## 2021-08-06 NOTE — TELEPHONE ENCOUNTER
Voice mail/message left requesting patient to return call [place] [facility] [address] [facility] including our hours of business and phone number  Kindly asked to LM with Full Name,  and Reminded CB may come from a non- number as the nurses are working remotely/off-site      Referral closed

## 2021-08-06 NOTE — PROGRESS NOTES
Assessment/Plan:    1  Neck pain, bilateral  -     XR spine cervical complete 4 or 5 vw non injury; Future; Expected date: 07/16/2021    2  Headache disorder  -     XR spine cervical complete 4 or 5 vw non injury; Future; Expected date: 07/16/2021    3  BMI 29 0-29 9,adult      BMI Counseling: Body mass index is 29 36 kg/m²  The BMI is above normal  Nutrition recommendations include encouraging healthy choices of fruits and vegetables, consuming healthier snacks, moderation in carbohydrate intake, increasing intake of lean protein, reducing intake of saturated and trans fat and reducing intake of cholesterol  Exercise recommendations include exercising 3-5 times per week and strength training exercises  No pharmacotherapy was ordered  Subjective:      Patient ID: Rachel Welsh is a 61 y o  female  Chief Complaint   Patient presents with    Neck Pain     runs to [place]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 , headaches in the back of head for passed 3 months       Neck Pain   This is a chronic problem  The current episode started more than 1 month ago  The problem occurs intermittently  The problem has been gradually worsening  The pain is associated with an unknown factor  The pain is present in the occipital region  The quality of the pain is described as aching and shooting  The pain is moderate  The symptoms are aggravated by position and stress  Associated symptoms include headaches  Pertinent negatives include no fever, photophobia, syncope or visual change  Headache   This is a chronic problem  The current episode started more than 1 month ago  The problem occurs intermittently  The problem has been gradually worsening  The pain is located in the occipital region  The quality of the pain is described as aching  The pain is moderate  Associated symptoms include neck pain   Pertinent negatives include no abnormal behavior, blurred vision, dizziness, eye pain, fever, loss of balance, muscle aches, nausea, phonophobia, photophobia, scalp tenderness, sinus pressure, visual change or vomiting  The symptoms are aggravated by emotional stress, fatigue and activity  She has tried acetaminophen and NSAIDs for the symptoms  Her past medical history is significant for hypertension  There is no history of recent head traumas  The following portions of the patient's history were reviewed and updated as appropriate: allergies, current medications, past family history, past medical history, past social history, past surgical history and problem list     Review of Systems   Constitutional: Positive for fatigue  Negative for fever  HENT: Negative for sinus pressure  Eyes: Negative for blurred vision, photophobia and pain  Respiratory: Negative  Cardiovascular: Negative  Negative for syncope  Gastrointestinal: Negative for nausea and vomiting  Musculoskeletal: Positive for neck pain  Neurological: Positive for headaches  Negative for dizziness and loss of balance  Psychiatric/Behavioral: Positive for decreased concentration and sleep disturbance  Negative for hallucinations and self-injury           Current Outpatient Medications   Medication Sig Dispense Refill    Calcium Carb-Cholecalciferol 600-500 MG-UNIT CAPS Take by mouth      Cholecalciferol (VITAMIN D3) 1000 units CAPS Take by mouth      citalopram (CeleXA) 20 mg tablet TAKE 1 TABLET BY MOUTH EVERY DAY 30 tablet 5    Docosahexaenoic Acid (DHA) 200 MG CAPS Take by mouth      Magnesium 200 MG CHEW Chew      Omega-3 Fatty Acids (FISH OIL) 1,000 mg Take by mouth      pantoprazole (PROTONIX) 40 mg tablet Take 1 tablet (40 mg total) by mouth daily 30 tablet 3    Probiotic Product (PROBIOTIC-10) CAPS Take by mouth      erythromycin (ILOTYCIN) ophthalmic ointment Administer 0 5 inches into the left eye every 8 (eight) hours X 7 days (Patient not taking: Reported on 7/16/2021) 3 5 g 1    metoprolol succinate (TOPROL-XL) 25 mg 24 hr tablet TAKE 1 TABLET BY MOUTH EVERY DAY 30 tablet 2     No current facility-administered medications for this visit  Objective:    /72 (BP Location: Left arm, Patient Position: Sitting, Cuff Size: Large)   Pulse 58   Temp (!) 97 2 °F (36 2 °C)   Resp 16   Ht 5' 9" (1 753 m)   Wt 90 2 kg (198 lb 12 8 oz)   BMI 29 36 kg/m²        Physical Exam  Vitals and nursing note reviewed  Constitutional:       General: She is not in acute distress  Comments: OW   HENT:      Head: Normocephalic and atraumatic  Eyes:      General: No scleral icterus  Conjunctiva/sclera: Conjunctivae normal    Cardiovascular:      Rate and Rhythm: Normal rate and regular rhythm  Pulmonary:      Effort: Pulmonary effort is normal  No respiratory distress  Breath sounds: Normal breath sounds  Musculoskeletal:      Cervical back: Tenderness (b/l paraspinal and trapezius mm) present  No rigidity  Lymphadenopathy:      Cervical: No cervical adenopathy  Neurological:      General: No focal deficit present  Mental Status: She is alert and oriented to person, place, and time  Cranial Nerves: No cranial nerve deficit     Psychiatric:         Mood and Affect: Mood normal            Barb Pinto MD

## 2021-08-10 ENCOUNTER — TELEPHONE (OUTPATIENT)
Dept: FAMILY MEDICINE CLINIC | Facility: CLINIC | Age: 56
End: 2021-08-10

## 2021-08-10 ENCOUNTER — NURSE TRIAGE (OUTPATIENT)
Dept: PHYSICAL THERAPY | Facility: OTHER | Age: 56
End: 2021-08-10

## 2021-08-10 DIAGNOSIS — M54.2 ACUTE NECK PAIN: Primary | ICD-10-CM

## 2021-08-10 DIAGNOSIS — M54.2 CERVICALGIA: Primary | ICD-10-CM

## 2021-08-10 NOTE — TELEPHONE ENCOUNTER
Additional Information   Negative: Is this related to a work injury?  Negative: Is this related to an MVA?  Negative: Are you currently recieving homecare services?  Negative: Has the patient had unexplained weight loss?  Negative: Does the patient have a fever?  Negative: Is the patient experiencing blood in sputum?  Negative: Is the patient experiencing urine retention?  Negative: Is the patient experiencing acute drop foot or paralysis?  Negative: Has the patient experienced major trauma? (fall from height, high speed collision, direct blow to spine) and is also experiencing nausea, light-headedness, or loss of consciousness?  Negative: Is this a chronic condition? Background - Initial Assessment  Clinical complaint: bilateral neck pain which radiates up the neck to the head  Does not go into arms or shoulder  No numbness or tingling  States this has been present for about 2-3 months  States she has seen chiropractor for lower back but nothing upper or neck  No incident or injury noted  Date of onset: 2-3 months   Frequency of pain: intermittent  Quality of pain: aching and throbbing    Protocols used: [facility] COMPREHENSIVE SPINE PROGRAM PROTOCOL    This RN did review in detail the Comprehensive Spine Program and what we can provide for their neck pain  Patient is agreeable to being triaged by this RN and would like to proceed with Physical Therapy  Referral was placed for Physical Therapy at the [place] site  Patients information was sent to the  to make evaluation appointment  Patient made aware that the PT office  will be calling to schedule the appointment  Patient was provided with the phone number to the PT office  No further questions and/or concerns were voiced by the patient at this time  Patient states understanding of the referral that was placed

## 2021-08-10 NOTE — TELEPHONE ENCOUNTER
----- Message from Linda Grey sent at 8/10/2021  4:22 PM EDT -----  Regarding: FW: Non-Urgent Medical Question  Contact: [phone]    ----- Message -----  From: Isaura Oconnell  Sent: 8/10/2021   3:50 PM EDT  To: [facility]  Subject: Non-Urgent Medical Question                      I will be going to [facility] downstairs in your building    They asked that you put in a script for PT using diagnostic code M54 2    Thank you,  Han Hunter

## 2021-09-07 ENCOUNTER — EVALUATION (OUTPATIENT)
Dept: PHYSICAL THERAPY | Facility: CLINIC | Age: 56
End: 2021-09-07
Payer: COMMERCIAL

## 2021-09-07 DIAGNOSIS — M54.12 CERVICAL RADICULOPATHY: Primary | ICD-10-CM

## 2021-09-07 PROCEDURE — 97161 PT EVAL LOW COMPLEX 20 MIN: CPT | Performed by: PHYSICAL THERAPIST

## 2021-09-07 NOTE — PROGRESS NOTES
PT Evaluation     Today's date: 2021  Patient name: Kayla Shah  : 1965  MRN: [id number]  Referring provider: LORIE Garza  Dx: No diagnosis found  Assessment  Assessment details: The patient presents with signs and symptoms related to postural dysfunction with suboccipital compression headache with prolonged computer work  She has been educated in the postural self-correction stratgies to prevent future neck pain and headache  She appears independent with a HEP and postural self-correction program   Impairments: poor posture     Goals  Initiate HEP  Initiate Postural self-correction  Plan  Plan details: Hold future PT, patient to perform HEP, and postural self-correction  Subjective Evaluation    History of Present Illness  Mechanism of injury: Patient reports neck pian began a few months ago, and getting headache, denies any injury  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  Headache was daily and the past week on vacation, there has been no headache and no neck pain  Recurrent probem    Quality of life: good    Pain  Current pain ratin  At best pain ratin  At worst pain ratin  Location: neck/suboccipital  Quality: dull ache and sharp  Relieving factors: change in position  Aggravating factors: sitting and keyboarding  Progression: improved    Treatments  Current treatment: physical therapy  Patient Goals  Patient goals for therapy: increased motion, improved balance, decreased pain, increased strength, independence with ADLs/IADLs, return to sport/leisure activities and return to work          Objective     Postural Observations  Seated posture: fair  Standing posture: fair  Correction of posture: makes symptoms better        Palpation   Left   Tenderness of the suboccipitals  Right   Tenderness of the suboccipitals       Strength/Myotome Testing   Cervical Spine     Left   Normal strength    Right   Normal strength             Precautions: Medical History    Diagnosis Date Comment Source   Anxiety      Hyperlipidemia      Hypertension      Lyme disease 07/13/2007     Neuropathy  last assessed: 10/16/14    PONV (postoperative nausea and vomiting)      Seasonal allergies      Sleep apnea            Manuals                                                                 Neuro Re-Ed 9/7            Postural self-correction perf                                                                                          Ther Ex             Jarad day 1  10x            Scapular retraction 10x                                                                                          Ther Activity                                       Gait Training                                       Modalities

## 2021-09-09 ENCOUNTER — APPOINTMENT (OUTPATIENT)
Dept: PHYSICAL THERAPY | Facility: CLINIC | Age: 56
End: 2021-09-09
Payer: COMMERCIAL

## 2021-09-13 ENCOUNTER — APPOINTMENT (OUTPATIENT)
Dept: PHYSICAL THERAPY | Facility: CLINIC | Age: 56
End: 2021-09-13
Payer: COMMERCIAL

## 2021-09-16 ENCOUNTER — APPOINTMENT (OUTPATIENT)
Dept: PHYSICAL THERAPY | Facility: CLINIC | Age: 56
End: 2021-09-16
Payer: COMMERCIAL

## 2021-09-20 ENCOUNTER — APPOINTMENT (OUTPATIENT)
Dept: PHYSICAL THERAPY | Facility: CLINIC | Age: 56
End: 2021-09-20
Payer: COMMERCIAL

## 2021-09-23 ENCOUNTER — APPOINTMENT (OUTPATIENT)
Dept: PHYSICAL THERAPY | Facility: CLINIC | Age: 56
End: 2021-09-23
Payer: COMMERCIAL

## 2021-09-27 ENCOUNTER — APPOINTMENT (OUTPATIENT)
Dept: PHYSICAL THERAPY | Facility: CLINIC | Age: 56
End: 2021-09-27
Payer: COMMERCIAL

## 2021-09-30 ENCOUNTER — APPOINTMENT (OUTPATIENT)
Dept: PHYSICAL THERAPY | Facility: CLINIC | Age: 56
End: 2021-09-30
Payer: COMMERCIAL

## 2021-10-22 ENCOUNTER — OFFICE VISIT (OUTPATIENT)
Dept: FAMILY MEDICINE CLINIC | Facility: CLINIC | Age: 56
End: 2021-10-22
Payer: COMMERCIAL

## 2021-10-22 VITALS
DIASTOLIC BLOOD PRESSURE: 80 MMHG | RESPIRATION RATE: 14 BRPM | TEMPERATURE: 97.3 F | HEART RATE: 62 BPM | BODY MASS INDEX: 29.15 KG/M2 | SYSTOLIC BLOOD PRESSURE: 128 MMHG | WEIGHT: 192.3 LBS | HEIGHT: 68 IN

## 2021-10-22 DIAGNOSIS — E78.5 HYPERLIPIDEMIA, UNSPECIFIED HYPERLIPIDEMIA TYPE: ICD-10-CM

## 2021-10-22 DIAGNOSIS — K21.9 GASTROESOPHAGEAL REFLUX DISEASE, UNSPECIFIED WHETHER ESOPHAGITIS PRESENT: ICD-10-CM

## 2021-10-22 DIAGNOSIS — R10.32 LLQ ABDOMINAL PAIN: ICD-10-CM

## 2021-10-22 DIAGNOSIS — Z00.00 PHYSICAL EXAM: Primary | ICD-10-CM

## 2021-10-22 DIAGNOSIS — R10.2 PELVIC PAIN IN FEMALE: ICD-10-CM

## 2021-10-22 DIAGNOSIS — Z23 NEED FOR INFLUENZA VACCINATION: ICD-10-CM

## 2021-10-22 DIAGNOSIS — R31.1 BENIGN ESSENTIAL MICROSCOPIC HEMATURIA: ICD-10-CM

## 2021-10-22 DIAGNOSIS — M25.50 ARTHRALGIA, UNSPECIFIED JOINT: ICD-10-CM

## 2021-10-22 DIAGNOSIS — Z12.31 ENCOUNTER FOR SCREENING MAMMOGRAM FOR MALIGNANT NEOPLASM OF BREAST: ICD-10-CM

## 2021-10-22 DIAGNOSIS — I10 ESSENTIAL HYPERTENSION: ICD-10-CM

## 2021-10-22 LAB
SL AMB  POCT GLUCOSE, UA: ABNORMAL
SL AMB LEUKOCYTE ESTERASE,UA: ABNORMAL
SL AMB POCT BILIRUBIN,UA: ABNORMAL
SL AMB POCT BLOOD,UA: ABNORMAL
SL AMB POCT CLARITY,UA: CLEAR
SL AMB POCT COLOR,UA: YELLOW
SL AMB POCT KETONES,UA: 15
SL AMB POCT NITRITE,UA: ABNORMAL
SL AMB POCT PH,UA: 5
SL AMB POCT SPECIFIC GRAVITY,UA: 1.02
SL AMB POCT URINE PROTEIN: ABNORMAL
SL AMB POCT UROBILINOGEN: ABNORMAL

## 2021-10-22 PROCEDURE — 90471 IMMUNIZATION ADMIN: CPT

## 2021-10-22 PROCEDURE — 81003 URINALYSIS AUTO W/O SCOPE: CPT | Performed by: FAMILY MEDICINE

## 2021-10-22 PROCEDURE — 99396 PREV VISIT EST AGE 40-64: CPT | Performed by: FAMILY MEDICINE

## 2021-10-22 PROCEDURE — 90682 RIV4 VACC RECOMBINANT DNA IM: CPT

## 2021-10-23 LAB
ALBUMIN SERPL-MCNC: 4.6 G/DL (ref 3.8–4.9)
ALBUMIN/GLOB SERPL: 2.2 {RATIO} (ref 1.2–2.2)
ALP SERPL-CCNC: 60 IU/L (ref 44–121)
ALT SERPL-CCNC: 17 IU/L (ref 0–32)
AMYLASE SERPL-CCNC: 44 U/L (ref 31–110)
ANA TITR SER IF: NEGATIVE {TITER}
AST SERPL-CCNC: 17 IU/L (ref 0–40)
B BURGDOR IGG+IGM SER-ACNC: <0.91 ISR (ref 0–0.9)
B BURGDOR IGM SER IA-ACNC: <0.8 INDEX (ref 0–0.79)
BACTERIA UR CULT: NORMAL
BASOPHILS # BLD AUTO: 0.1 X10E3/UL (ref 0–0.2)
BASOPHILS NFR BLD AUTO: 1 %
BILIRUB SERPL-MCNC: 1.5 MG/DL (ref 0–1.2)
BUN SERPL-MCNC: 12 MG/DL (ref 6–24)
BUN/CREAT SERPL: 19 (ref 9–23)
CALCIUM SERPL-MCNC: 9.6 MG/DL (ref 8.7–10.2)
CHLORIDE SERPL-SCNC: 99 MMOL/L (ref 96–106)
CHOLEST SERPL-MCNC: 247 MG/DL (ref 100–199)
CO2 SERPL-SCNC: 24 MMOL/L (ref 20–29)
CREAT SERPL-MCNC: 0.64 MG/DL (ref 0.57–1)
EOSINOPHIL # BLD AUTO: 0.2 X10E3/UL (ref 0–0.4)
EOSINOPHIL NFR BLD AUTO: 3 %
ERYTHROCYTE [DISTWIDTH] IN BLOOD BY AUTOMATED COUNT: 12.2 % (ref 11.7–15.4)
GLOBULIN SER-MCNC: 2.1 G/DL (ref 1.5–4.5)
GLUCOSE SERPL-MCNC: 88 MG/DL (ref 65–99)
HBA1C MFR BLD: 5.3 % (ref 4.8–5.6)
HCT VFR BLD AUTO: 40.9 % (ref 34–46.6)
HDLC SERPL-MCNC: 48 MG/DL
HGB BLD-MCNC: 13.9 G/DL (ref 11.1–15.9)
IMM GRANULOCYTES # BLD: 0 X10E3/UL (ref 0–0.1)
IMM GRANULOCYTES NFR BLD: 0 %
LDLC SERPL CALC-MCNC: 178 MG/DL (ref 0–99)
LIPASE SERPL-CCNC: 32 U/L (ref 14–72)
LYMPHOCYTES # BLD AUTO: 2.7 X10E3/UL (ref 0.7–3.1)
LYMPHOCYTES NFR BLD AUTO: 45 %
Lab: NO GROWTH
MAGNESIUM SERPL-MCNC: 2 MG/DL (ref 1.6–2.3)
MCH RBC QN AUTO: 30.4 PG (ref 26.6–33)
MCHC RBC AUTO-ENTMCNC: 34 G/DL (ref 31.5–35.7)
MCV RBC AUTO: 90 FL (ref 79–97)
MICRODELETION SYND BLD/T FISH: NORMAL
MONOCYTES # BLD AUTO: 0.5 X10E3/UL (ref 0.1–0.9)
MONOCYTES NFR BLD AUTO: 8 %
NEUTROPHILS # BLD AUTO: 2.7 X10E3/UL (ref 1.4–7)
NEUTROPHILS NFR BLD AUTO: 43 %
PLATELET # BLD AUTO: 258 X10E3/UL (ref 150–450)
POTASSIUM SERPL-SCNC: 4.3 MMOL/L (ref 3.5–5.2)
PROT SERPL-MCNC: 6.7 G/DL (ref 6–8.5)
RBC # BLD AUTO: 4.57 X10E6/UL (ref 3.77–5.28)
SL AMB EGFR AFRICAN AMERICAN: 115 ML/MIN/1.73
SL AMB EGFR NON AFRICAN AMERICAN: 99 ML/MIN/1.73
SODIUM SERPL-SCNC: 139 MMOL/L (ref 134–144)
TRIGL SERPL-MCNC: 119 MG/DL (ref 0–149)
TSH SERPL DL<=0.005 MIU/L-ACNC: 0.99 UIU/ML (ref 0.45–4.5)
WBC # BLD AUTO: 6.2 X10E3/UL (ref 3.4–10.8)

## 2021-10-25 ENCOUNTER — TELEPHONE (OUTPATIENT)
Dept: FAMILY MEDICINE CLINIC | Facility: CLINIC | Age: 56
End: 2021-10-25

## 2021-10-28 ENCOUNTER — HOSPITAL ENCOUNTER (OUTPATIENT)
Dept: RADIOLOGY | Facility: HOSPITAL | Age: 56
Discharge: HOME | End: 2021-10-28
Attending: FAMILY MEDICINE
Payer: COMMERCIAL

## 2021-10-28 DIAGNOSIS — R10.32 LLQ ABDOMINAL PAIN: ICD-10-CM

## 2021-10-28 DIAGNOSIS — R10.2 PELVIC PAIN IN FEMALE: ICD-10-CM

## 2021-10-28 PROCEDURE — 76856 US EXAM PELVIC COMPLETE: CPT

## 2021-10-28 PROCEDURE — 76830 TRANSVAGINAL US NON-OB: CPT

## 2021-10-30 DIAGNOSIS — I10 ESSENTIAL HYPERTENSION: ICD-10-CM

## 2021-10-30 DIAGNOSIS — K21.9 GASTROESOPHAGEAL REFLUX DISEASE WITHOUT ESOPHAGITIS: ICD-10-CM

## 2021-11-01 RX ORDER — METOPROLOL SUCCINATE 25 MG/1
TABLET, EXTENDED RELEASE ORAL
Qty: 30 TABLET | Refills: 2 | Status: SHIPPED | OUTPATIENT
Start: 2021-11-01 | End: 2022-01-31

## 2021-11-01 RX ORDER — PANTOPRAZOLE SODIUM 40 MG/1
TABLET, DELAYED RELEASE ORAL
Qty: 30 TABLET | Refills: 3 | Status: SHIPPED | OUTPATIENT
Start: 2021-11-01

## 2021-11-02 PROBLEM — R31.1 BENIGN ESSENTIAL MICROSCOPIC HEMATURIA: Status: ACTIVE | Noted: 2021-11-02

## 2021-11-16 ENCOUNTER — TELEPHONE (OUTPATIENT)
Dept: FAMILY MEDICINE CLINIC | Facility: CLINIC | Age: 56
End: 2021-11-16

## 2022-01-30 DIAGNOSIS — F41.9 ANXIETY AND DEPRESSION: ICD-10-CM

## 2022-01-30 DIAGNOSIS — I10 ESSENTIAL HYPERTENSION: ICD-10-CM

## 2022-01-30 DIAGNOSIS — F32.A ANXIETY AND DEPRESSION: ICD-10-CM

## 2022-01-31 RX ORDER — METOPROLOL SUCCINATE 25 MG/1
TABLET, EXTENDED RELEASE ORAL
Qty: 30 TABLET | Refills: 2 | Status: SHIPPED | OUTPATIENT
Start: 2022-01-31 | End: 2022-04-13

## 2022-01-31 RX ORDER — CITALOPRAM 20 MG/1
TABLET ORAL
Qty: 30 TABLET | Refills: 2 | Status: SHIPPED | OUTPATIENT
Start: 2022-01-31 | End: 2022-04-13

## 2022-02-17 ENCOUNTER — TELEPHONE (OUTPATIENT)
Dept: FAMILY MEDICINE CLINIC | Facility: CLINIC | Age: 57
End: 2022-02-17

## 2022-02-17 NOTE — TELEPHONE ENCOUNTER
----- Message from Frances Grullon MD sent at 2/17/2022  9:56 AM EST -----  Please inform mammogram wnl--rpt in one year-

## 2022-04-13 DIAGNOSIS — I10 ESSENTIAL HYPERTENSION: ICD-10-CM

## 2022-04-13 DIAGNOSIS — E78.01 FAMILIAL HYPERCHOLESTEROLEMIA: ICD-10-CM

## 2022-04-13 DIAGNOSIS — F32.A ANXIETY AND DEPRESSION: ICD-10-CM

## 2022-04-13 DIAGNOSIS — F41.9 ANXIETY AND DEPRESSION: ICD-10-CM

## 2022-04-13 RX ORDER — EZETIMIBE 10 MG/1
TABLET ORAL
Qty: 90 TABLET | Refills: 1 | Status: SHIPPED | OUTPATIENT
Start: 2022-04-13

## 2022-04-13 RX ORDER — CITALOPRAM 20 MG/1
TABLET ORAL
Qty: 30 TABLET | Refills: 2 | Status: SHIPPED | OUTPATIENT
Start: 2022-04-13 | End: 2022-07-29

## 2022-04-13 RX ORDER — METOPROLOL SUCCINATE 25 MG/1
TABLET, EXTENDED RELEASE ORAL
Qty: 30 TABLET | Refills: 2 | Status: SHIPPED | OUTPATIENT
Start: 2022-04-13 | End: 2022-07-29

## 2022-07-29 DIAGNOSIS — F32.A ANXIETY AND DEPRESSION: ICD-10-CM

## 2022-07-29 DIAGNOSIS — F41.9 ANXIETY AND DEPRESSION: ICD-10-CM

## 2022-07-29 DIAGNOSIS — I10 ESSENTIAL HYPERTENSION: ICD-10-CM

## 2022-07-29 RX ORDER — METOPROLOL SUCCINATE 25 MG/1
TABLET, EXTENDED RELEASE ORAL
Qty: 30 TABLET | Refills: 2 | Status: SHIPPED | OUTPATIENT
Start: 2022-07-29 | End: 2022-10-26

## 2022-07-29 RX ORDER — CITALOPRAM 20 MG/1
TABLET ORAL
Qty: 30 TABLET | Refills: 2 | Status: SHIPPED | OUTPATIENT
Start: 2022-07-29 | End: 2022-10-26

## 2022-09-28 DIAGNOSIS — E78.01 FAMILIAL HYPERCHOLESTEROLEMIA: ICD-10-CM

## 2022-09-28 RX ORDER — EZETIMIBE 10 MG/1
TABLET ORAL
Qty: 90 TABLET | Refills: 1 | Status: SHIPPED | OUTPATIENT
Start: 2022-09-28

## 2022-10-26 DIAGNOSIS — I10 ESSENTIAL HYPERTENSION: ICD-10-CM

## 2022-10-26 DIAGNOSIS — F41.9 ANXIETY AND DEPRESSION: ICD-10-CM

## 2022-10-26 DIAGNOSIS — F32.A ANXIETY AND DEPRESSION: ICD-10-CM

## 2022-10-26 RX ORDER — METOPROLOL SUCCINATE 25 MG/1
TABLET, EXTENDED RELEASE ORAL
Qty: 30 TABLET | Refills: 2 | Status: SHIPPED | OUTPATIENT
Start: 2022-10-26 | End: 2022-10-30

## 2022-10-26 RX ORDER — CITALOPRAM 20 MG/1
TABLET ORAL
Qty: 30 TABLET | Refills: 2 | Status: SHIPPED | OUTPATIENT
Start: 2022-10-26 | End: 2022-10-30

## 2022-10-29 DIAGNOSIS — I10 ESSENTIAL HYPERTENSION: ICD-10-CM

## 2022-10-29 DIAGNOSIS — F32.A ANXIETY AND DEPRESSION: ICD-10-CM

## 2022-10-29 DIAGNOSIS — F41.9 ANXIETY AND DEPRESSION: ICD-10-CM

## 2022-10-30 RX ORDER — CITALOPRAM 20 MG/1
TABLET ORAL
Qty: 30 TABLET | Refills: 2 | Status: SHIPPED | OUTPATIENT
Start: 2022-10-30

## 2022-10-30 RX ORDER — METOPROLOL SUCCINATE 25 MG/1
TABLET, EXTENDED RELEASE ORAL
Qty: 30 TABLET | Refills: 2 | Status: SHIPPED | OUTPATIENT
Start: 2022-10-30

## 2022-11-02 ENCOUNTER — OFFICE VISIT (OUTPATIENT)
Dept: FAMILY MEDICINE CLINIC | Facility: CLINIC | Age: 57
End: 2022-11-02

## 2022-11-02 VITALS
HEART RATE: 58 BPM | SYSTOLIC BLOOD PRESSURE: 120 MMHG | HEIGHT: 67 IN | TEMPERATURE: 97.8 F | WEIGHT: 188.6 LBS | BODY MASS INDEX: 29.6 KG/M2 | DIASTOLIC BLOOD PRESSURE: 80 MMHG

## 2022-11-02 DIAGNOSIS — R10.30 LOWER ABDOMINAL PAIN, UNSPECIFIED: ICD-10-CM

## 2022-11-02 DIAGNOSIS — Z12.31 SCREENING MAMMOGRAM FOR BREAST CANCER: ICD-10-CM

## 2022-11-02 DIAGNOSIS — Z23 NEED FOR INFLUENZA VACCINATION: ICD-10-CM

## 2022-11-02 DIAGNOSIS — Z00.00 ANNUAL PHYSICAL EXAM: Primary | ICD-10-CM

## 2022-11-02 DIAGNOSIS — E78.01 FAMILIAL HYPERCHOLESTEROLEMIA: ICD-10-CM

## 2022-11-02 LAB
SL AMB  POCT GLUCOSE, UA: NORMAL
SL AMB LEUKOCYTE ESTERASE,UA: NORMAL
SL AMB POCT BILIRUBIN,UA: NORMAL
SL AMB POCT BLOOD,UA: NORMAL
SL AMB POCT CLARITY,UA: CLEAR
SL AMB POCT COLOR,UA: YELLOW
SL AMB POCT KETONES,UA: NORMAL
SL AMB POCT NITRITE,UA: NORMAL
SL AMB POCT PH,UA: 5
SL AMB POCT SPECIFIC GRAVITY,UA: 1.02
SL AMB POCT URINE PROTEIN: NORMAL
SL AMB POCT UROBILINOGEN: NORMAL

## 2022-11-02 NOTE — PROGRESS NOTES
[facility] HEALTH MAINTENANCE OFFICE VISIT  [facility] - [facility]    NAME: Annalise Lerner  AGE: 61 y o  SEX: female  : 1965     DATE: 2022    Assessment and Plan     1  Annual physical exam  -     POCT urine dip auto non-scope  -     Lipid Panel with Direct LDL reflex; Future  -     TSH, 3rd generation; Future  -     Magnesium; Future  -     Lipase; Future  -     Amylase; Future  -     CBC; Future  -     Comprehensive metabolic panel; Future  -     Hemoglobin A1C; Future    2  Need for influenza vaccination  -     FLUBLOK: influenza vaccine, quadrivalent, recombinant, PF, 0 5 mL    3  Screening mammogram for breast cancer  -     Mammo screening bilateral w 3d & cad; Future; Expected date: 2022    4  Familial hypercholesterolemia  -     Lipid Panel with Direct LDL reflex; Future  -     TSH, 3rd generation; Future  -     Lipase; Future  -     Amylase; Future  -     Comprehensive metabolic panel; Future    5  Lower abdominal pain, unspecified  -     CBC; Future  -     Comprehensive metabolic panel; Future    6  BMI 29 0-29 9,adult  -     TSH, 3rd generation; Future  -     Hemoglobin A1C; Future      Patient Counseling:   Nutrition: Stressed importance of a well balanced diet, moderation of sodium/saturated fat, caloric balance and sufficient intake of fiber  Exercise: Stressed the importance of regular exercise with a goal of 150 minutes per week  Dental Health: Discussed daily flossing and brushing and regular dental visits   Injury Prevention: Discussed Safety Belts, Safety Helmets, and Smoke Detectors    Immunizations reviewed: Risks and Benefits discussed  Discussed benefits of:  Colon Cancer Screening, Mammogram , Cervical Cancer screening and Screening labs  BMI Counseling: Body mass index is 29 54 kg/m²  Discussed with patient's BMI with her   The BMI is above normal  Nutrition recommendations include 3-5 servings of fruits/vegetables daily, consuming healthier snacks, moderation in carbohydrate intake, increasing intake of lean protein, reducing intake of saturated fat and trans fat and reducing intake of cholesterol  Exercise recommendations include exercising 3-5 times per week and strength training exercises  Chief Complaint     Chief Complaint   Patient presents with   • Physical Exam       History of Present Illness     HPI    Well Adult Physical   Patient here for a comprehensive physical exam       Diet and Physical Activity  Diet: well balanced diet  Exercise: intermittently      Depression Screen  PHQ-2/9 Depression Screening    Little interest or pleasure in doing things: 0 - not at all  Feeling down, depressed, or hopeless: 0 - not at all  PHQ-2 Score: 0  PHQ-2 Interpretation: Negative depression screen          General Health  Hearing: Normal:  bilateral  Vision: goes for regular eye exams  Dental: regular dental visits    Reproductive Health  Post-menopausal       The following portions of the patient's history were reviewed and updated as appropriate: allergies, current medications, past family history, past medical history, past social history, past surgical history and problem list     Review of Systems     Review of Systems   Constitutional: Positive for fatigue  Negative for fever  HENT: Negative for sinus pressure  Eyes: Negative for photophobia and pain  Respiratory: Negative  Cardiovascular: Negative  Gastrointestinal: Positive for abdominal pain  Negative for nausea and vomiting  GERD   Musculoskeletal: Positive for arthralgias and neck pain  Allergic/Immunologic: Positive for environmental allergies  Neurological: Positive for headaches  Negative for dizziness  Psychiatric/Behavioral: Positive for decreased concentration and sleep disturbance  Negative for hallucinations and self-injury         Past Medical History     Past Medical History:   Diagnosis Date   • Anxiety    • Hyperlipidemia    • Hypertension    • Lyme disease 2007   • Neuropathy     last assessed: 10/16/14   • PONV (postoperative nausea and vomiting)    • Seasonal allergies    • Sleep apnea     snoring       Past Surgical History     Past Surgical History:   Procedure Laterality Date   • BREAST SURGERY     • MYOMECTOMY     • SKIN BIOPSY      SCC, BCC   • VARICOSE VEIN SURGERY      Ligation       Social History     Social History     Socioeconomic History   • Marital status: /Civil Union     Spouse name: None   • Number of children: None   • Years of education: None   • Highest education level: None   Occupational History   • None   Tobacco Use   • Smoking status: Former     Packs/day: 0 25     Years: 25 00     Pack years: 6 25     Types: Cigarettes     Quit date:      Years since quittin 8   • Smokeless tobacco: Former   • Tobacco comments:     off and onsmoker   Vaping Use   • Vaping Use: Every day   • Substances: THC   Substance and Sexual Activity   • Alcohol use: Yes     Comment: occasional, social   • Drug use: Yes     Types: Marijuana     Comment: vape    • Sexual activity: Yes   Other Topics Concern   • None   Social History Narrative   • None     Social Determinants of Health     Financial Resource Strain: Not on file   Food Insecurity: Not on file   Transportation Needs: Not on file   Physical Activity: Not on file   Stress: Not on file   Social Connections: Not on file   Intimate Partner Violence: Not on file   Housing Stability: Not on file       Family History     Family History   Problem Relation Age of Onset   • Heart disease Mother    • Heart disease Father    • Hypertension Father    • Breast cancer Sister    • Alzheimer's disease Maternal Grandmother         dementia   • Breast cancer Maternal Grandmother    • Colon cancer Maternal Grandfather    • Heart disease Other    • Breast cancer Other    • Alzheimer's disease Maternal Uncle         dementia       Current Medications       Current Outpatient Medications:   •  Calcium Carb-Cholecalciferol 600-500 MG-UNIT CAPS, Take by mouth, Disp: , Rfl:   •  Cholecalciferol (VITAMIN D3) 1000 units CAPS, Take by mouth, Disp: , Rfl:   •  citalopram (CeleXA) 20 mg tablet, TAKE ONE TABLET BY MOUTH EVERY DAY, Disp: 30 tablet, Rfl: 2  •  Docosahexaenoic Acid (DHA) 200 MG CAPS, Take by mouth, Disp: , Rfl:   •  ezetimibe (ZETIA) 10 mg tablet, TAKE ONE TABLET BY MOUTH EVERY DAY, Disp: 90 tablet, Rfl: 1  •  Magnesium 200 MG CHEW, Chew, Disp: , Rfl:   •  metoprolol succinate (TOPROL-XL) 25 mg 24 hr tablet, TAKE ONE TABLET BY MOUTH EVERY DAY, Disp: 30 tablet, Rfl: 2  •  Omega-3 Fatty Acids (FISH OIL) 1,000 mg, Take by mouth, Disp: , Rfl:   •  Probiotic Product (PROBIOTIC-10) CAPS, Take by mouth, Disp: , Rfl:      Allergies     Allergies   Allergen Reactions   • Codeine Nausea Only     Reaction Date: [id number]; Annotation - [id number]: dizzy  /jjw     Immunization History   Administered Date(s) Administered   • COVID-19 MODERNA VACC 0 5 ML IM 03/11/2021, 04/13/2021, 11/05/2021   • Influenza Injectable, MDCK, Preservative Free, Quadrivalent 10/08/2019   • Influenza Quadrivalent Preservative Free 3 years and older IM 09/18/2017   • Influenza, injectable, quadrivalent, preservative free 0 5 mL 10/05/2020   • Influenza, recombinant, quadrivalent,injectable, preservative free 10/22/2021, 11/02/2022   • Influenza, seasonal, injectable 10/16/2014, 10/09/2015, 11/30/2016   • Tdap 07/25/2009     Objective     /80 (BP Location: Left arm, Patient Position: Sitting, Cuff Size: Large)   Pulse 58   Temp 97 8 °F (36 6 °C)   Ht 5' 7" (1 702 m)   Wt 85 5 kg (188 lb 9 6 oz)   BMI 29 54 kg/m²      Physical Exam  Vitals and nursing note reviewed  Constitutional:       General: She is not in acute distress  Comments: OW   HENT:      Head: Normocephalic and atraumatic  Eyes:      General: No scleral icterus       Conjunctiva/sclera: Conjunctivae normal    Cardiovascular:      Rate and Rhythm: Normal rate and regular rhythm  Pulmonary:      Effort: Pulmonary effort is normal  No respiratory distress  Breath sounds: Normal breath sounds  Abdominal:      General: Bowel sounds are normal       Tenderness: There is no abdominal tenderness  There is no right CVA tenderness or left CVA tenderness  Musculoskeletal:         General: Tenderness present  Cervical back: Tenderness (b/l paraspinal and trapezius mm) present  No rigidity  Lymphadenopathy:      Cervical: No cervical adenopathy  Skin:     General: Skin is warm and dry  Coloration: Skin is not jaundiced  Neurological:      General: No focal deficit present  Mental Status: She is alert and oriented to person, place, and time  Cranial Nerves: No cranial nerve deficit     Psychiatric:         Mood and Affect: Mood normal            Vision Screening    Right eye Left eye Both eyes   Without correction      With correction 20/20 20/20 20/20           [facility]

## 2022-11-11 LAB
ALBUMIN SERPL-MCNC: 4.4 G/DL (ref 3.8–4.9)
ALBUMIN/GLOB SERPL: 2.3 {RATIO} (ref 1.2–2.2)
ALP SERPL-CCNC: 64 IU/L (ref 44–121)
ALT SERPL-CCNC: 34 IU/L (ref 0–32)
AMYLASE SERPL-CCNC: 46 U/L (ref 31–110)
AST SERPL-CCNC: 23 IU/L (ref 0–40)
BASOPHILS # BLD AUTO: 0.1 X10E3/UL (ref 0–0.2)
BASOPHILS NFR BLD AUTO: 1 %
BILIRUB SERPL-MCNC: 1.7 MG/DL (ref 0–1.2)
BUN SERPL-MCNC: 13 MG/DL (ref 6–24)
BUN/CREAT SERPL: 19 (ref 9–23)
CALCIUM SERPL-MCNC: 9.3 MG/DL (ref 8.7–10.2)
CHLORIDE SERPL-SCNC: 101 MMOL/L (ref 96–106)
CHOLEST SERPL-MCNC: 207 MG/DL (ref 100–199)
CO2 SERPL-SCNC: 27 MMOL/L (ref 20–29)
CREAT SERPL-MCNC: 0.69 MG/DL (ref 0.57–1)
EGFR: 101 ML/MIN/1.73
EOSINOPHIL # BLD AUTO: 0.2 X10E3/UL (ref 0–0.4)
EOSINOPHIL NFR BLD AUTO: 3 %
ERYTHROCYTE [DISTWIDTH] IN BLOOD BY AUTOMATED COUNT: 11.5 % (ref 11.7–15.4)
GLOBULIN SER-MCNC: 1.9 G/DL (ref 1.5–4.5)
GLUCOSE SERPL-MCNC: 83 MG/DL (ref 70–99)
HBA1C MFR BLD: 5.3 % (ref 4.8–5.6)
HCT VFR BLD AUTO: 40.8 % (ref 34–46.6)
HDLC SERPL-MCNC: 48 MG/DL
HGB BLD-MCNC: 13.9 G/DL (ref 11.1–15.9)
IMM GRANULOCYTES # BLD: 0 X10E3/UL (ref 0–0.1)
IMM GRANULOCYTES NFR BLD: 0 %
LDLC SERPL CALC-MCNC: 138 MG/DL (ref 0–99)
LIPASE SERPL-CCNC: 28 U/L (ref 14–72)
LYMPHOCYTES # BLD AUTO: 2.4 X10E3/UL (ref 0.7–3.1)
LYMPHOCYTES NFR BLD AUTO: 35 %
MAGNESIUM SERPL-MCNC: 2 MG/DL (ref 1.6–2.3)
MCH RBC QN AUTO: 30.2 PG (ref 26.6–33)
MCHC RBC AUTO-ENTMCNC: 34.1 G/DL (ref 31.5–35.7)
MCV RBC AUTO: 89 FL (ref 79–97)
MICRODELETION SYND BLD/T FISH: NORMAL
MONOCYTES # BLD AUTO: 0.5 X10E3/UL (ref 0.1–0.9)
MONOCYTES NFR BLD AUTO: 7 %
NEUTROPHILS # BLD AUTO: 3.5 X10E3/UL (ref 1.4–7)
NEUTROPHILS NFR BLD AUTO: 54 %
PLATELET # BLD AUTO: 237 X10E3/UL (ref 150–450)
POTASSIUM SERPL-SCNC: 4.3 MMOL/L (ref 3.5–5.2)
PROT SERPL-MCNC: 6.3 G/DL (ref 6–8.5)
RBC # BLD AUTO: 4.61 X10E6/UL (ref 3.77–5.28)
SODIUM SERPL-SCNC: 139 MMOL/L (ref 134–144)
TRIGL SERPL-MCNC: 119 MG/DL (ref 0–149)
TSH SERPL DL<=0.005 MIU/L-ACNC: 2.2 UIU/ML (ref 0.45–4.5)
WBC # BLD AUTO: 6.7 X10E3/UL (ref 3.4–10.8)

## 2023-01-24 DIAGNOSIS — F32.A ANXIETY AND DEPRESSION: ICD-10-CM

## 2023-01-24 DIAGNOSIS — I10 ESSENTIAL HYPERTENSION: ICD-10-CM

## 2023-01-24 DIAGNOSIS — F41.9 ANXIETY AND DEPRESSION: ICD-10-CM

## 2023-01-24 RX ORDER — CITALOPRAM 20 MG/1
TABLET ORAL
Qty: 30 TABLET | Refills: 2 | Status: SHIPPED | OUTPATIENT
Start: 2023-01-24

## 2023-01-24 RX ORDER — METOPROLOL SUCCINATE 25 MG/1
TABLET, EXTENDED RELEASE ORAL
Qty: 30 TABLET | Refills: 2 | Status: SHIPPED | OUTPATIENT
Start: 2023-01-24

## 2023-02-21 ENCOUNTER — TELEPHONE (OUTPATIENT)
Dept: FAMILY MEDICINE CLINIC | Facility: CLINIC | Age: 58
End: 2023-02-21

## 2023-02-21 DIAGNOSIS — Z12.31 ENCOUNTER FOR SCREENING MAMMOGRAM FOR MALIGNANT NEOPLASM OF BREAST: ICD-10-CM

## 2023-02-21 NOTE — TELEPHONE ENCOUNTER
----- Message from Nargis Almonte sent at 2/21/2023 11:03 AM EST -----  Regarding: Mammogram  Contact: [phone]  The test results message in my MyChart was blank    Do you have results from my mammogram?    Verlee Kawasaki

## 2023-02-25 DIAGNOSIS — E78.01 FAMILIAL HYPERCHOLESTEROLEMIA: ICD-10-CM

## 2023-02-25 RX ORDER — EZETIMIBE 10 MG/1
TABLET ORAL
Qty: 90 TABLET | Refills: 0 | Status: SHIPPED | OUTPATIENT
Start: 2023-02-25 | End: 2023-02-28 | Stop reason: SDUPTHER

## 2023-02-28 DIAGNOSIS — E78.01 FAMILIAL HYPERCHOLESTEROLEMIA: ICD-10-CM

## 2023-02-28 RX ORDER — EZETIMIBE 10 MG/1
10 TABLET ORAL DAILY
Qty: 30 TABLET | Refills: 0 | Status: SHIPPED | OUTPATIENT
Start: 2023-02-28 | End: 2023-03-29 | Stop reason: SDUPTHER

## 2023-02-28 RX ORDER — EZETIMIBE 10 MG/1
TABLET ORAL
Qty: 90 TABLET | Refills: 1 | OUTPATIENT
Start: 2023-02-28

## 2023-03-13 PROBLEM — R10.2 PELVIC PAIN IN FEMALE: Status: RESOLVED | Noted: 2021-10-22 | Resolved: 2023-03-13

## 2023-03-13 PROBLEM — H81.10 BENIGN PAROXYSMAL POSITIONAL VERTIGO: Status: ACTIVE | Noted: 2023-03-13

## 2023-03-13 PROBLEM — R10.13 EPIGASTRIC PAIN: Status: RESOLVED | Noted: 2020-12-14 | Resolved: 2023-03-13

## 2023-03-13 PROBLEM — K21.9 GERD (GASTROESOPHAGEAL REFLUX DISEASE): Status: RESOLVED | Noted: 2021-02-12 | Resolved: 2023-03-13

## 2023-03-13 PROBLEM — M54.2 NECK PAIN, BILATERAL: Status: RESOLVED | Noted: 2021-08-05 | Resolved: 2023-03-13

## 2023-03-13 PROBLEM — R10.30 LOWER ABDOMINAL PAIN, UNSPECIFIED: Status: RESOLVED | Noted: 2021-10-22 | Resolved: 2023-03-13

## 2023-03-13 PROBLEM — R51.9 HEADACHE DISORDER: Status: RESOLVED | Noted: 2021-08-05 | Resolved: 2023-03-13

## 2023-03-15 ENCOUNTER — APPOINTMENT (OUTPATIENT)
Dept: LAB | Facility: CLINIC | Age: 58
End: 2023-03-15

## 2023-03-15 DIAGNOSIS — E78.2 MIXED HYPERLIPIDEMIA: ICD-10-CM

## 2023-03-15 DIAGNOSIS — H81.10 BENIGN PAROXYSMAL POSITIONAL VERTIGO, UNSPECIFIED LATERALITY: ICD-10-CM

## 2023-03-15 LAB
ALBUMIN SERPL BCP-MCNC: 3.9 G/DL (ref 3.5–5)
ALP SERPL-CCNC: 54 U/L (ref 46–116)
ALT SERPL W P-5'-P-CCNC: 49 U/L (ref 12–78)
ANION GAP SERPL CALCULATED.3IONS-SCNC: 3 MMOL/L (ref 4–13)
AST SERPL W P-5'-P-CCNC: 21 U/L (ref 5–45)
BILIRUB SERPL-MCNC: 2.11 MG/DL (ref 0.2–1)
BUN SERPL-MCNC: 16 MG/DL (ref 5–25)
CALCIUM SERPL-MCNC: 9.3 MG/DL (ref 8.3–10.1)
CHLORIDE SERPL-SCNC: 104 MMOL/L (ref 96–108)
CHOLEST SERPL-MCNC: 225 MG/DL
CO2 SERPL-SCNC: 29 MMOL/L (ref 21–32)
CREAT SERPL-MCNC: 0.65 MG/DL (ref 0.6–1.3)
GFR SERPL CREATININE-BSD FRML MDRD: 97 ML/MIN/1.73SQ M
GLUCOSE P FAST SERPL-MCNC: 103 MG/DL (ref 65–99)
HDLC SERPL-MCNC: 53 MG/DL
LDLC SERPL CALC-MCNC: 142 MG/DL (ref 0–100)
POTASSIUM SERPL-SCNC: 4 MMOL/L (ref 3.5–5.3)
PROT SERPL-MCNC: 6.8 G/DL (ref 6.4–8.4)
SODIUM SERPL-SCNC: 136 MMOL/L (ref 135–147)
TRIGL SERPL-MCNC: 148 MG/DL

## 2023-04-24 DIAGNOSIS — F32.A ANXIETY AND DEPRESSION: ICD-10-CM

## 2023-04-24 DIAGNOSIS — F41.9 ANXIETY AND DEPRESSION: ICD-10-CM

## 2023-04-24 DIAGNOSIS — I10 ESSENTIAL HYPERTENSION: ICD-10-CM

## 2023-04-24 RX ORDER — METOPROLOL SUCCINATE 25 MG/1
TABLET, EXTENDED RELEASE ORAL
Qty: 30 TABLET | Refills: 2 | Status: SHIPPED | OUTPATIENT
Start: 2023-04-24

## 2023-04-24 RX ORDER — CITALOPRAM 20 MG/1
TABLET ORAL
Qty: 30 TABLET | Refills: 2 | Status: SHIPPED | OUTPATIENT
Start: 2023-04-24

## 2023-06-19 NOTE — PROGRESS NOTES
[facility] HEALTH MAINTENANCE OFFICE VISIT  [facility] - [facility]    NAME: Marizta Amaya  AGE: 53 y o  SEX: female  : 1965     DATE: 2019    Assessment and Plan     1  Physical exam  -     Amylase; Future  -     Lipase; Future  -     Lipid Panel with Direct LDL reflex; Future  -     CBC; Future  -     Comprehensive metabolic panel; Future  -     Magnesium; Future  -     TSH, 3rd generation; Future  -     Hemoglobin A1C; Future  -     Sedimentation rate, automated; Future    2  Dysuria  Comments:  UA-+leukos-urine cx sent  Orders:  -     POCT urine dip auto non-scope  -     Urine culture    3  Breast cancer screening  Comments:  ref for mammogram given  Orders:  -     Mammo screening bilateral w 3d & cad; Future; Expected date: 2019    4  Cervical cancer screening  Comments:  thin prep pap sent  Orders:  -     Thinprep Pap(Refl)H/L HPV; Future    5  Hyperlipidemia, unspecified hyperlipidemia type  Comments:  check lipid prof and thyroid fxn, maintain low chol diet  Orders:  -     Amylase; Future  -     Lipase; Future  -     Lipid Panel with Direct LDL reflex; Future  -     CBC; Future  -     TSH, 3rd generation; Future  -     Hemoglobin A1C; Future    6  Thyromegaly  Comments:  check thyroid u/s and TSH  Orders:  -     US thyroid; Future; Expected date: 2019    7  Subconjunctival hemorrhage of right eye  Comments:  warm compresses, avoid straining, monitor BP  Orders:  -     Sedimentation rate, automated;  Future        · Patient Counseling:   · Nutrition: Stressed importance of a well balanced diet, moderation of sodium/saturated fat, caloric balance and sufficient intake of fiber  · Exercise: Stressed the importance of regular exercise with a goal of 150 minutes per week  · Dental Health: Discussed daily flossing and brushing and regular dental visits     · Immunizations reviewed: had flu vacc, t/c tetanus booster-pt will check w ins re:coverage  · Discussed benefits of:  Colon Cancer Screening, Mammogram , Cervical Cancer screening and rec for reg eye/dental checks   BMI Counseling: Body mass index is 29 95 kg/m²  Discussed with patient's BMI with her  The BMI is above normal  Dietary modifications and rec for regular exercise d/w pt  Return if symptoms worsen or fail to improve  Chief Complaint     Chief Complaint   Patient presents with    Physical Exam     w pap       History of Present Illness     HPI    Well Adult Physical   Patient here for a comprehensive physical exam       Diet and Physical Activity  Diet: well balanced diet  Exercise: intermittently      Depression Screen  PHQ-9 Depression Screening    PHQ-9:    Frequency of the following problems over the past two weeks:               General Health  Hearing: Normal:  bilateral  Vision: goes for regular eye exams  Dental: regular dental visits    Reproductive Health  Post-menopausal       The following portions of the patient's history were reviewed and updated as appropriate: allergies, current medications, past family history, past medical history, past social history, past surgical history and problem list     Review of Systems     Review of Systems   Constitutional: Positive for fatigue  Respiratory: Negative  Cardiovascular: Negative  Musculoskeletal: Positive for arthralgias         Past Medical History     Past Medical History:   Diagnosis Date    Lyme disease 07/13/2007    Neuropathy     last assessed: 10/16/14       Past Surgical History     Past Surgical History:   Procedure Laterality Date    BREAST SURGERY      MYOMECTOMY      SKIN BIOPSY      SCC, BCC    VARICOSE VEIN SURGERY      Ligation       Social History     Social History     Socioeconomic History    Marital status: /Civil Union     Spouse name: None    Number of children: None    Years of education: None    Highest education level: None   Occupational History    None   Social Needs    Financial resource strain: None    Food insecurity:     Worry: None     Inability: None    Transportation needs:     Medical: None     Non-medical: None   Tobacco Use    Smoking status: Former Smoker    Smokeless tobacco: Former User   Substance and Sexual Activity    Alcohol use: Yes     Comment: occasional, social    Drug use: No    Sexual activity: Yes   Lifestyle    Physical activity:     Days per week: None     Minutes per session: None    Stress: None   Relationships    Social connections:     Talks on phone: None     Gets together: None     Attends Congregational service: None     Active member of club or organization: None     Attends meetings of clubs or organizations: None     Relationship status: None    Intimate partner violence:     Fear of current or ex partner: None     Emotionally abused: None     Physically abused: None     Forced sexual activity: None   Other Topics Concern    None   Social History Narrative    None       Family History     Family History   Problem Relation Age of Onset    Heart disease Mother     Heart disease Father     Hypertension Father     Breast cancer Sister     Alzheimer's disease Maternal Grandmother         dementia    Breast cancer Maternal Grandmother     Colon cancer Maternal Grandfather     Heart disease Other     Breast cancer Other     Alzheimer's disease Maternal Uncle         dementia       Current Medications       Current Outpatient Medications:     aspirin 81 mg chewable tablet, Chew 81 mg daily, Disp: , Rfl:     Calcium Carb-Cholecalciferol 600-500 MG-UNIT CAPS, Take by mouth, Disp: , Rfl:     Cholecalciferol (VITAMIN D3) 1000 units CAPS, Take by mouth, Disp: , Rfl:     citalopram (CeleXA) 20 mg tablet, TAKE 1 TABLET BY MOUTH EVERY DAY, Disp: 30 tablet, Rfl: 5    Docosahexaenoic Acid (DHA) 200 MG CAPS, Take by mouth, Disp: , Rfl:     Magnesium 200 MG CHEW, Chew, Disp: , Rfl:     Omega-3 Fatty Acids (FISH OIL) 1,000 mg, Take by mouth, Disp: , Rfl:     Probiotic Product (PROBIOTIC-10) CAPS, Take by mouth, Disp: , Rfl:     montelukast (SINGULAIR) 10 mg tablet, TAKE ONE TABLET BY MOUTH ONE TIME DAILY IN THE P M   (Patient not taking: Reported on 11/20/2019), Disp: 30 tablet, Rfl: 0     Allergies     Allergies   Allergen Reactions    Codeine Nausea Only     Reaction Date: [id number]; Annotation - [id number]: dizzy  /jjw       Objective     /78 (BP Location: Left arm, Patient Position: Sitting, Cuff Size: Large)   Pulse 80   Temp 98 2 °F (36 8 °C)   Resp 14   Ht 5' 8" (1 727 m)   Wt 89 4 kg (197 lb)   BMI 29 95 kg/m²      Physical Exam   Constitutional: She is oriented to person, place, and time  She appears well-developed and well-nourished  No distress  HENT:   Head: Normocephalic and atraumatic  Nose: Nose normal    Mouth/Throat: No oropharyngeal exudate  Eyes: Pupils are equal, round, and reactive to light  Conjunctivae and EOM are normal  Right eye exhibits no discharge  Left eye exhibits no discharge  No scleral icterus  Right eye redness   Neck: Neck supple  Cardiovascular: Normal rate, regular rhythm and intact distal pulses  Pulmonary/Chest: Effort normal and breath sounds normal  No respiratory distress  Abdominal: Soft  Bowel sounds are normal  There is no tenderness  Genitourinary: Vagina normal and uterus normal  No vaginal discharge found  Genitourinary Comments: Breast exam: No discrete palp mass, no sc or axillary LAD,No nipple rtx or d/c   Musculoskeletal: Normal range of motion  Neurological: She is alert and oriented to person, place, and time  No cranial nerve deficit  Skin: Skin is warm and dry  Psychiatric: She has a normal mood and affect  Vitals reviewed           Visual Acuity Screening    Right eye Left eye Both eyes   Without correction:      With correction: 20/20 20/20 20/15           Bishnu Pena MD  [address] Internal Medicine Progress Note  Krysten Martinez, PGY-1    ******INCOMPLETE******    OVERNIGHT EVENTS/INTERVAL HPI:    OBJECTIVE:  Vital Signs Last 24 Hrs  T(C): 36.5 (19 Jun 2023 05:32), Max: 36.5 (19 Jun 2023 05:32)  T(F): 97.7 (19 Jun 2023 05:32), Max: 97.7 (19 Jun 2023 05:32)  HR: 88 (19 Jun 2023 08:00) (84 - 136)  BP: 100/61 (19 Jun 2023 08:00) (94/50 - 152/70)  BP(mean): 73 (19 Jun 2023 08:00) (64 - 105)  RR: 18 (19 Jun 2023 08:00) (17 - 24)  SpO2: 99% (19 Jun 2023 08:00) (97% - 100%)    Parameters below as of 19 Jun 2023 08:00  Patient On (Oxygen Delivery Method): nasal cannula, high flow  O2 Flow (L/min): 60  O2 Concentration (%): 40  I&O's Detail    18 Jun 2023 07:01  -  19 Jun 2023 07:00  --------------------------------------------------------  IN:    Glucerna: 240 mL  Total IN: 240 mL    OUT:    Voided (mL): 300 mL  Total OUT: 300 mL    Total NET: -60 mL        Physical Exam:  GENERAL: Awake, alert and interactive, no acute distress, appears comfortable  NEURO: A&Ox4, no focal deficits, 5/5 strength in all ext, reflexes 2+ throughout, CN 2-12 intact  HEENT: Normocephalic, atraumatic, no conjunctivitis or scleral icterus, oral mucosa moist, no oral lesions noted  NECK: Supple, no LAD, no JVD, thyroid not palpable  CARDIAC: Regular rate and rhythm, +S1/S2, no murmurs/rubs/gallops  PULM: Breathing comfortably on RA, clear to auscultation bilaterally, no wheezes/rales/rhonchi  ABDOMEN: Soft, nontender, nondistended, +bs, no hepatosplenomegaly, no rebound tenderness or fluid wave, no CVA tenderness  : Deferred  MSK: Range of motion grossly intact, no back tenderness  SKIN: Warm and dry, no rashes, lesions  VASC: Cap refil < 2 sec, 2+ peripheral pulses, no edema, no LE tenderness  Psych: Appropriate affect    Medications:  MEDICATIONS  (STANDING):  acetylcysteine 20%  Inhalation 4 milliLiter(s) Inhalation every 6 hours  aMIOdarone    Tablet 400 milliGRAM(s) Oral every 12 hours  atorvastatin 5 milliGRAM(s) Oral at bedtime  carbidopa/levodopa  25/100 2.5 Tablet(s) Oral every 8 hours  chlorhexidine 2% Cloths 1 Application(s) Topical daily  diltiazem    Tablet 60 milliGRAM(s) Oral every 8 hours  enoxaparin Injectable 40 milliGRAM(s) SubCutaneous every 24 hours  insulin lispro (ADMELOG) corrective regimen sliding scale   SubCutaneous every 6 hours  ipratropium    for Nebulization 500 MICROGram(s) Nebulizer every 6 hours  piperacillin/tazobactam IVPB.. 4.5 Gram(s) IV Intermittent every 8 hours  potassium chloride  20 mEq/100 mL IVPB 20 milliEquivalent(s) IV Intermittent every 2 hours  sertraline 50 milliGRAM(s) Oral every 24 hours  sodium chloride 3%  Inhalation 4 milliLiter(s) Inhalation every 6 hours    MEDICATIONS  (PRN):  acetaminophen     Tablet .. 650 milliGRAM(s) Oral every 6 hours PRN Temp greater or equal to 38C (100.4F), Mild Pain (1 - 3)  melatonin 3 milliGRAM(s) Oral at bedtime PRN Insomnia      Labs:                        8.6    10.08 )-----------( 240      ( 19 Jun 2023 05:30 )             27.3     06-19    134<L>  |  94<L>  |  15  ----------------------------<  227<H>  3.3<L>   |  30  |  0.53    Ca    8.9      19 Jun 2023 05:30  Phos  4.2     06-19  Mg     1.8     06-19    TPro  5.5<L>  /  Alb  2.5<L>  /  TBili  0.2  /  DBili  x   /  AST  16  /  ALT  7<L>  /  AlkPhos  280<H>  06-19          SARS-CoV-2: NotDetec (18 Jun 2023 19:19)  SARS-CoV-2: NotDetec (28 May 2023 16:58)  SARS-CoV-2: NotDetec (22 May 2023 18:49)      Radiology: Reviewed OVERNIGHT EVENTS/INTERVAL HPI: Pt was in AFib with RVR, given diltiazem, amiodarone, digoxin. This morning, pt examined at bedside, comfortable on HFNC intermittently coughing. Pt responds in 1 word answers, states she is not in any pain, not short of breath, feels the same as yesterday.     OBJECTIVE:  Vital Signs Last 24 Hrs  T(C): 36.5 (19 Jun 2023 05:32), Max: 36.5 (19 Jun 2023 05:32)  T(F): 97.7 (19 Jun 2023 05:32), Max: 97.7 (19 Jun 2023 05:32)  HR: 88 (19 Jun 2023 08:00) (84 - 136)  BP: 100/61 (19 Jun 2023 08:00) (94/50 - 152/70)  BP(mean): 73 (19 Jun 2023 08:00) (64 - 105)  RR: 18 (19 Jun 2023 08:00) (17 - 24)  SpO2: 99% (19 Jun 2023 08:00) (97% - 100%)    Parameters below as of 19 Jun 2023 08:00  Patient On (Oxygen Delivery Method): nasal cannula, high flow  O2 Flow (L/min): 60  O2 Concentration (%): 40  I&O's Detail    18 Jun 2023 07:01  -  19 Jun 2023 07:00  --------------------------------------------------------  IN:    Glucerna: 240 mL  Total IN: 240 mL    OUT:    Voided (mL): 300 mL  Total OUT: 300 mL    Total NET: -60 mL    PHYSICAL EXAM:  Constitutional: thin frail elderly female resting comfortably with NG tube in place on HFNC, NAD  HEENT: NC/AT, PERRL, anicteric sclera, MMM  Neck: supple; no JVD or thyromegaly  Respiratory: +mild rhonchi b/l, no retractions  Cardiac: +S1/S2; +tachycardic, +irregular, no M/R/G  Gastrointestinal: soft, NT/ND; no rebound or guarding; +BS  Extremities: WWP, no clubbing or cyanosis; no peripheral edema  Vascular: 2+ radial, DP pulses B/L  Dermatologic: skin warm, dry and intact; no rashes, wounds, or scars  Neurologic: AAOx2 (self & place), follows commands, answers questions      Medications:  MEDICATIONS  (STANDING):  acetylcysteine 20%  Inhalation 4 milliLiter(s) Inhalation every 6 hours  aMIOdarone    Tablet 400 milliGRAM(s) Oral every 12 hours  atorvastatin 5 milliGRAM(s) Oral at bedtime  carbidopa/levodopa  25/100 2.5 Tablet(s) Oral every 8 hours  chlorhexidine 2% Cloths 1 Application(s) Topical daily  diltiazem    Tablet 60 milliGRAM(s) Oral every 8 hours  enoxaparin Injectable 40 milliGRAM(s) SubCutaneous every 24 hours  insulin lispro (ADMELOG) corrective regimen sliding scale   SubCutaneous every 6 hours  ipratropium    for Nebulization 500 MICROGram(s) Nebulizer every 6 hours  piperacillin/tazobactam IVPB.. 4.5 Gram(s) IV Intermittent every 8 hours  potassium chloride  20 mEq/100 mL IVPB 20 milliEquivalent(s) IV Intermittent every 2 hours  sertraline 50 milliGRAM(s) Oral every 24 hours  sodium chloride 3%  Inhalation 4 milliLiter(s) Inhalation every 6 hours    MEDICATIONS  (PRN):  acetaminophen     Tablet .. 650 milliGRAM(s) Oral every 6 hours PRN Temp greater or equal to 38C (100.4F), Mild Pain (1 - 3)  melatonin 3 milliGRAM(s) Oral at bedtime PRN Insomnia      Labs:                        8.6    10.08 )-----------( 240      ( 19 Jun 2023 05:30 )             27.3     06-19    134<L>  |  94<L>  |  15  ----------------------------<  227<H>  3.3<L>   |  30  |  0.53    Ca    8.9      19 Jun 2023 05:30  Phos  4.2     06-19  Mg     1.8     06-19    TPro  5.5<L>  /  Alb  2.5<L>  /  TBili  0.2  /  DBili  x   /  AST  16  /  ALT  7<L>  /  AlkPhos  280<H>  06-19          SARS-CoV-2: NotDetec (18 Jun 2023 19:19)  SARS-CoV-2: NotDetec (28 May 2023 16:58)  SARS-CoV-2: NotDetec (22 May 2023 18:49)

## 2023-07-05 ENCOUNTER — TELEPHONE (OUTPATIENT)
Dept: FAMILY MEDICINE CLINIC | Facility: CLINIC | Age: 58
End: 2023-07-05

## 2023-07-05 ENCOUNTER — OFFICE VISIT (OUTPATIENT)
Dept: FAMILY MEDICINE CLINIC | Facility: CLINIC | Age: 58
End: 2023-07-05
Payer: COMMERCIAL

## 2023-07-05 VITALS
SYSTOLIC BLOOD PRESSURE: 136 MMHG | WEIGHT: 196.4 LBS | HEIGHT: 67 IN | TEMPERATURE: 98.6 F | HEART RATE: 58 BPM | BODY MASS INDEX: 30.83 KG/M2 | DIASTOLIC BLOOD PRESSURE: 72 MMHG

## 2023-07-05 DIAGNOSIS — G89.29 CHRONIC PAIN OF LEFT KNEE: Primary | ICD-10-CM

## 2023-07-05 DIAGNOSIS — M25.562 CHRONIC PAIN OF LEFT KNEE: Primary | ICD-10-CM

## 2023-07-05 PROCEDURE — 99213 OFFICE O/P EST LOW 20 MIN: CPT | Performed by: FAMILY MEDICINE

## 2023-07-05 RX ORDER — ZINC GLUCONATE 50 MG
50 TABLET ORAL DAILY
COMMUNITY

## 2023-07-05 RX ORDER — SACCHAROMYCES BOULARDII 250 MG
250 CAPSULE ORAL 2 TIMES DAILY
COMMUNITY

## 2023-07-05 RX ORDER — MELOXICAM 15 MG/1
15 TABLET ORAL DAILY
Qty: 30 TABLET | Refills: 0 | Status: SHIPPED | OUTPATIENT
Start: 2023-07-05 | End: 2023-08-04

## 2023-07-10 NOTE — PROGRESS NOTES
Assessment/Plan:    1. Chronic pain of left knee  -     Ambulatory Referral to Orthopedic Surgery; Future  -     meloxicam (Mobic) 15 mg tablet; Take 1 tablet (15 mg total) by mouth daily         Subjective:      Patient ID: Tanvi Mayorga is a 60 y.o. female. Chief Complaint   Patient presents with   • Knee Pain     Pain started months ago but keeps getting worse       Knee Pain   The injury mechanism is unknown. The pain is present in the left knee. The quality of the pain is described as aching. The pain is moderate. The pain has been worsening since onset. Pertinent negatives include no inability to bear weight, loss of motion or loss of sensation. She reports no foreign bodies present. The symptoms are aggravated by movement and weight bearing. She has tried ice, heat, acetaminophen and rest for the symptoms. The treatment provided no relief.        The following portions of the patient's history were reviewed and updated as appropriate: allergies, current medications, past family history, past medical history, past social history, past surgical history and problem list.    Review of Systems      Current Outpatient Medications   Medication Sig Dispense Refill   • Calcium Carb-Cholecalciferol 600-500 MG-UNIT CAPS Take by mouth     • Cholecalciferol (VITAMIN D3) 1000 units CAPS Take by mouth     • citalopram (CeleXA) 20 mg tablet TAKE ONE TABLET BY MOUTH EVERY DAY 30 tablet 2   • Docosahexaenoic Acid (DHA) 200 MG CAPS Take by mouth     • Elderberry 500 MG CAPS Take by mouth     • ezetimibe (ZETIA) 10 mg tablet Take 1 tablet (10 mg total) by mouth daily 90 tablet 3   • Magnesium 200 MG CHEW Chew     • meloxicam (Mobic) 15 mg tablet Take 1 tablet (15 mg total) by mouth daily 30 tablet 0   • metoprolol succinate (TOPROL-XL) 25 mg 24 hr tablet TAKE ONE TABLET BY MOUTH EVERY DAY 30 tablet 2   • Omega-3 Fatty Acids (FISH OIL) 1,000 mg Take by mouth     • Probiotic Product (PROBIOTIC-10) CAPS Take by mouth     • saccharomyces boulardii (FLORASTOR) 250 mg capsule Take 250 mg by mouth 2 (two) times a day     • zinc gluconate 50 mg tablet Take 50 mg by mouth daily       No current facility-administered medications for this visit. Objective:    /72   Pulse 58   Temp 98.6 °F (37 °C)   Ht 5' 7" (1.702 m)   Wt 89.1 kg (196 lb 6.4 oz)   BMI 30.76 kg/m²        Physical Exam  Vitals and nursing note reviewed. Constitutional:       General: She is not in acute distress. Comments: OW   Cardiovascular:      Rate and Rhythm: Normal rate and regular rhythm. Pulmonary:      Effort: Pulmonary effort is normal. No respiratory distress. Musculoskeletal:         General: Tenderness (left knee-+Crepitance w flexion/extension) present. Right lower leg: No edema. Left lower leg: No edema. Skin:     Findings: No bruising, erythema, lesion or rash. Neurological:      Mental Status: She is alert and oriented to person, place, and time.                 Mayo Yo MD DISCHARGE

## 2023-07-18 ENCOUNTER — APPOINTMENT (OUTPATIENT)
Dept: RADIOLOGY | Facility: CLINIC | Age: 58
End: 2023-07-18
Payer: COMMERCIAL

## 2023-07-18 ENCOUNTER — OFFICE VISIT (OUTPATIENT)
Dept: OBGYN CLINIC | Facility: CLINIC | Age: 58
End: 2023-07-18
Payer: COMMERCIAL

## 2023-07-18 VITALS
SYSTOLIC BLOOD PRESSURE: 121 MMHG | BODY MASS INDEX: 30.76 KG/M2 | WEIGHT: 196 LBS | HEIGHT: 67 IN | HEART RATE: 58 BPM | DIASTOLIC BLOOD PRESSURE: 79 MMHG

## 2023-07-18 DIAGNOSIS — G89.29 CHRONIC PAIN OF LEFT KNEE: ICD-10-CM

## 2023-07-18 DIAGNOSIS — M25.562 CHRONIC PAIN OF LEFT KNEE: ICD-10-CM

## 2023-07-18 DIAGNOSIS — M17.12 PRIMARY OSTEOARTHRITIS OF LEFT KNEE: ICD-10-CM

## 2023-07-18 DIAGNOSIS — Z01.89 ENCOUNTER FOR LOWER EXTREMITY COMPARISON IMAGING STUDY: Primary | ICD-10-CM

## 2023-07-18 DIAGNOSIS — Z01.89 ENCOUNTER FOR LOWER EXTREMITY COMPARISON IMAGING STUDY: ICD-10-CM

## 2023-07-18 PROCEDURE — 20610 DRAIN/INJ JOINT/BURSA W/O US: CPT | Performed by: ORTHOPAEDIC SURGERY

## 2023-07-18 PROCEDURE — 99203 OFFICE O/P NEW LOW 30 MIN: CPT | Performed by: ORTHOPAEDIC SURGERY

## 2023-07-18 PROCEDURE — 73564 X-RAY EXAM KNEE 4 OR MORE: CPT

## 2023-07-18 RX ORDER — TRIAMCINOLONE ACETONIDE 40 MG/ML
40 INJECTION, SUSPENSION INTRA-ARTICULAR; INTRAMUSCULAR
Status: COMPLETED | OUTPATIENT
Start: 2023-07-18 | End: 2023-07-18

## 2023-07-18 RX ORDER — BUPIVACAINE HYDROCHLORIDE 5 MG/ML
10 INJECTION, SOLUTION EPIDURAL; INTRACAUDAL
Status: COMPLETED | OUTPATIENT
Start: 2023-07-18 | End: 2023-07-18

## 2023-07-18 RX ADMIN — BUPIVACAINE HYDROCHLORIDE 10 ML: 5 INJECTION, SOLUTION EPIDURAL; INTRACAUDAL at 08:00

## 2023-07-18 RX ADMIN — TRIAMCINOLONE ACETONIDE 40 MG: 40 INJECTION, SUSPENSION INTRA-ARTICULAR; INTRAMUSCULAR at 08:00

## 2023-07-18 NOTE — PROGRESS NOTES
Ortho Sports Medicine New Patient Visit     Assesment:   60 y.o. female with left primary knee osteoarthritis    Plan: We had a long discussion regarding left knee degenerative joint disease, including treatment options. We discussed non-operative treatment options, such as physical therapy, medial  brace, voltaren gel, ibuprofen, steroid injections, and viscosupplementation injections. We discussed that ultimately, a knee replacement is the surgical option to treat knee osteoarthritis, but there are many non-operative interventions that may give her adequate, long-term symptomatic relief. I believe a referral to PT will help with strength and mobility as well as improve symptoms. The  brace works to take pressure off the part of the knee where the arthritis is most prominent. There is valgus gapping on exam today and the patient reports intermittent feelings of instability, so I believe the brace will beneficial for this patient. However, the patient would like to prioritize other non-operative treatments at this time Voltaren gel can be applied to the painful area on the knee and ibuprofen can be taken as needed. The patient has been using Mobic consistently for the past week. We discussed other NSAID options and preventing consistent, long-term use that can negatively affect her kidneys. We discussed the options for injections, including the risks, benefits, and alternatives. We explained that getting multiple steroid injections in close proximity can be detrimental to tissues, but a single injection is usually effective in providing some degree of pain relief. The patient would like to receive a CSI today and tolerated the procedure well. We may consider Visco injections in the future if necessary. Large joint arthrocentesis: L knee  Universal Protocol:  Consent: Verbal consent obtained.   Risks and benefits: risks, benefits and alternatives were discussed  Consent given by: patient  Time out: Immediately prior to procedure a "time out" was called to verify the correct patient, procedure, equipment, support staff and site/side marked as required. Patient understanding: patient states understanding of the procedure being performed  Patient consent: the patient's understanding of the procedure matches consent given  Procedure consent: procedure consent matches procedure scheduled  Relevant documents: relevant documents present and verified  Patient identity confirmed: verbally with patient    Supporting Documentation  Indications: pain   Procedure Details  Location: knee - L knee  Preparation: Patient was prepped and draped in the usual sterile fashion  Needle size: 22 G  Ultrasound guidance: no  Approach: superior  Medications administered: 10 mL bupivacaine (PF) 0.5 %; 40 mg triamcinolone acetonide 40 mg/mL    Patient tolerance: patient tolerated the procedure well with no immediate complications  Dressing:  Sterile dressing applied    1mg kenalog  4 mL 0.5% bupivicaine injected            Chief Complaint   Patient presents with   • Left Knee - Pain       History of Present Illness: The patient is a 60 y.o. female presenting with chronic, atraumatic left knee pain localized to the medial aspect of the knee. Pain has been gradually worsening over the last month, especially with climbing stairs, going from sitting to standing, and extended periods of walking. The patient also notes cracking sensations in the knee and infrequent feelings of instability. She denies injury or trauma, swelling, locking episodes, or numbness/tingling. Dong Luo has tried Mobic for the past week as well as glucosamine and chondroitin supplements, which provide some relief. The patient has not attended formal PT, tried bracing options, or received any injections into the knee so far.         Knee Surgical History:  None    Past Medical, Social and Family History:  Past Medical History:   Diagnosis Date   • Anxiety    • Hyperlipidemia    • Hypertension    • Lyme disease 07/13/2007   • Neuropathy     last assessed: 10/16/14   • PONV (postoperative nausea and vomiting)    • Seasonal allergies    • Sleep apnea     snoring     Past Surgical History:   Procedure Laterality Date   • BREAST SURGERY     • MYOMECTOMY     • SKIN BIOPSY      SCC, BCC   • VARICOSE VEIN SURGERY      Ligation     Allergies   Allergen Reactions   • Codeine Nausea Only     Reaction Date: [id number]; Annotation - [id number]: dizzy  /jjw     Current Outpatient Medications on File Prior to Visit   Medication Sig Dispense Refill   • Calcium Carb-Cholecalciferol 600-500 MG-UNIT CAPS Take by mouth     • Cholecalciferol (VITAMIN D3) 1000 units CAPS Take by mouth     • citalopram (CeleXA) 20 mg tablet TAKE ONE TABLET BY MOUTH EVERY DAY 30 tablet 2   • Docosahexaenoic Acid (DHA) 200 MG CAPS Take by mouth     • Elderberry 500 MG CAPS Take by mouth     • ezetimibe (ZETIA) 10 mg tablet Take 1 tablet (10 mg total) by mouth daily 90 tablet 3   • Magnesium 200 MG CHEW Chew     • meloxicam (Mobic) 15 mg tablet Take 1 tablet (15 mg total) by mouth daily 30 tablet 0   • metoprolol succinate (TOPROL-XL) 25 mg 24 hr tablet TAKE ONE TABLET BY MOUTH EVERY DAY 30 tablet 2   • Omega-3 Fatty Acids (FISH OIL) 1,000 mg Take by mouth     • Probiotic Product (PROBIOTIC-10) CAPS Take by mouth     • saccharomyces boulardii (FLORASTOR) 250 mg capsule Take 250 mg by mouth 2 (two) times a day     • zinc gluconate 50 mg tablet Take 50 mg by mouth daily       No current facility-administered medications on file prior to visit.      Social History     Socioeconomic History   • Marital status: /Civil Union     Spouse name: Not on file   • Number of children: Not on file   • Years of education: Not on file   • Highest education level: Not on file   Occupational History   • Not on file   Tobacco Use   • Smoking status: Former     Packs/day: 0.25     Years: 25.00     Total pack years: 6.25     Types: Cigarettes     Start date: 1976     Quit date:      Years since quittin.5   • Smokeless tobacco: Former   • Tobacco comments:     off and onsmoker   Vaping Use   • Vaping Use: Every day   • Substances: THC   Substance and Sexual Activity   • Alcohol use: Yes     Comment: occasional, social   • Drug use: Yes     Types: Marijuana     Comment: vape    • Sexual activity: Yes   Other Topics Concern   • Not on file   Social History Narrative   • Not on file     Social Determinants of Health     Financial Resource Strain: Not on file   Food Insecurity: Not on file   Transportation Needs: Not on file   Physical Activity: Not on file   Stress: Not on file   Social Connections: Not on file   Intimate Partner Violence: Not on file   Housing Stability: Not on file         I have reviewed the past medical, surgical, social and family history, medications and allergies as documented in the EMR. Review of systems: ROS is negative other than that noted in the HPI. Constitutional: Negative for fatigue and fever. HENT: Negative for sore throat. Respiratory: Negative for shortness of breath. Cardiovascular: Negative for chest pain. Gastrointestinal: Negative for abdominal pain. Endocrine: Negative for cold intolerance and heat intolerance. Genitourinary: Negative for flank pain. Musculoskeletal: Negative for back pain. Skin: Negative for rash. Allergic/Immunologic: Negative for immunocompromised state. Neurological: Negative for dizziness. Psychiatric/Behavioral: Negative for agitation. Physical Exam:    Blood pressure 121/79, pulse 58, height 5' 7" (1.702 m), weight 88.9 kg (196 lb).     General/Constitutional: NAD, well developed, well nourished  HENT: Normocephalic, atraumatic  CV: Intact distal pulses, regular rate  Resp: No respiratory distress or labored breathing  Lymphatic: No lymphadenopathy palpated  Neuro: Alert and Oriented x 3, no focal deficits  Psych: Normal mood, normal affect  Skin: Warm, dry, no rashes, no erythema      Knee Exam:   No significant skin lesions or deformity. Range of motion from 0 to 130. Limited patellar mobility. Mild medial joint line tenderness. No lateral joint line tenderness. Knee is stable to varus stress, Lachman, and posterior drawer. There is valgus gapping with a firm endpoint on exam. Neurovascularly intact distally      Knee Imaging    X-rays of the knees reviewed and interpreted today. These show no acute fractures. Medial compartment degenerative changes bilaterally, including joint space narrowing, osteophytosis, and subchondral sclerosis. Patella is well centered on the trochlea.

## 2023-07-25 DIAGNOSIS — F41.9 ANXIETY AND DEPRESSION: ICD-10-CM

## 2023-07-25 DIAGNOSIS — F32.A ANXIETY AND DEPRESSION: ICD-10-CM

## 2023-07-25 DIAGNOSIS — I10 ESSENTIAL HYPERTENSION: ICD-10-CM

## 2023-07-25 RX ORDER — METOPROLOL SUCCINATE 25 MG/1
TABLET, EXTENDED RELEASE ORAL
Qty: 30 TABLET | Refills: 2 | Status: SHIPPED | OUTPATIENT
Start: 2023-07-25

## 2023-07-25 RX ORDER — CITALOPRAM 20 MG/1
TABLET ORAL
Qty: 30 TABLET | Refills: 2 | Status: SHIPPED | OUTPATIENT
Start: 2023-07-25

## 2023-07-31 ENCOUNTER — EVALUATION (OUTPATIENT)
Dept: PHYSICAL THERAPY | Facility: CLINIC | Age: 58
End: 2023-07-31
Payer: COMMERCIAL

## 2023-07-31 DIAGNOSIS — M17.12 PRIMARY OSTEOARTHRITIS OF LEFT KNEE: ICD-10-CM

## 2023-07-31 PROCEDURE — 97161 PT EVAL LOW COMPLEX 20 MIN: CPT | Performed by: PHYSICAL MEDICINE & REHABILITATION

## 2023-07-31 NOTE — PROGRESS NOTES
PT Evaluation     Today's date: 2023  Patient name: Audrey Irving  : 1965  MRN: [id number]  Referring provider: Opal Pizarro  Dx:   Encounter Diagnosis     ICD-10-CM    1. Primary osteoarthritis of left knee  M17.12 Ambulatory Referral to Physical Therapy                     Assessment  Assessment details: Audrey Irving is an 60 y.o. female  arriving to clinic with complaints of left > right pain. Patient presents to clinic today with decreased strength in LLE pain limiting, impaired joint mobility of L knee, hamstring flexibility deficits, ROM deficits in left knee in end range knee extension/flexion, and slight edema noted in posterior knee. Due to current deficits, patient is limited functionally with ADL's, recreational activities, engaging in social activities, ambulation, stair negotiation, lifting/carrying, transfers. Patient has been educated in home exercise program and plan of care. Patient would benefit from skilled physical therapy services to address their aforementioned functional limitations and progress towards prior level of function and independence with home exercise program.    Impairments: abnormal or restricted ROM, activity intolerance, impaired physical strength, lacks appropriate home exercise program, pain with function and weight-bearing intolerance    Symptom irritability: high  Goals  Short term goals: 4 weeks  1. Improve walking tolerance to 20 minutes to perform household activity  2. Patient to be able to negotiate 13 stairs with pain 4/10 pain or less  3. Patient will be able to perform sit to stand transfers from low chair without increased pain  4. Patient to reduce pain to 4/10 at its worst to improve activity tolerance    Long term goals: 12 weeks  1. Improve walking tolerance to 45 minutes   2. Patient to improve knee flexion to 135 degrees to improve stair negotiation  3. Patient to improve proximal hip strength to 5/5 to improve knee stability  4. Patient to reduce pain to 2/10 at its worst to improve QOL and return to function      Plan  Patient would benefit from: skilled physical therapy  Planned modality interventions: TENS, unattended electrical stimulation, thermotherapy: hydrocollator packs and cryotherapy  Planned therapy interventions: abdominal trunk stabilization, flexibility, functional ROM exercises, home exercise program, therapeutic exercise, therapeutic activities, stretching, strengthening, self care, postural training, patient education, neuromuscular re-education, massage, manual therapy and joint mobilization  Frequency: 2x week  Duration in weeks: 12  Treatment plan discussed with: patient        Subjective Evaluation    History of Present Illness  Mechanism of injury: Patient reports a chronic history of left knee pain. Patient states over the past couple of months symptoms have worsened. Patient states she is having difficulties with things such getting up from low chairs, walking, stair negotiation, lifting/carrying, and walking on uneven surfaces. Patient notes she did follow up with her orthopedic who took x-rays that showed bilateral degenerative changes. Patient discussed conservative intervention. Patient was provided with a CSI noting only slight relief. Patient was referred to physical therapy. Patient also notes right knee pain however not as severe. Recurrent probem    Patient Goals  Patient goals for therapy: increased strength, decreased pain, return to sport/leisure activities, increased motion and independence with ADLs/IADLs    Pain  Current pain ratin  At best pain ratin  At worst pain rating: 10  Location: L>R knee  Quality: discomfort, dull ache, throbbing and tight  Relieving factors: rest and medications  Exacerbated by: As noted above.   Progression: no change    Social Support  Steps to enter house: yes    Employment status: working    Diagnostic Tests  X-ray: abnormal  Treatments  Current treatment: injection treatment and medication        Objective  MMT:    Right  Left    Hip Flexion:   4+/5  4+/5  Hip Abduction:   4+/5  4+/5  Hip Adduction:   5/5  5/5  Hip Extension:   4/5  4/5  Knee Flexion:   5/5  4+/5  Knee Extension:  5/5  4+/5  Ankle Dorsiflexion:  5/5  5/5  Ankle Plantarflexion:  5/5  5/5    AROM:    Right  Left    Knee Flexion:   140  130  Knee Extension:  0  -3    EDEMA:   Right  Left    Joint Line:   39.5 cm 40.5 cm     AMBULATION:  Antalgic gait pattern    PALPATION:  Slight tenderness to palpation along L>R medial joint line    FLEXIBILITY:  Slight deficit noted in bilateral hamstring flexibility    SPECIAL TESTS:  NT         Precautions:   Past Medical History:   Diagnosis Date   • Anxiety    • Hyperlipidemia    • Hypertension    • Lyme disease 07/13/2007   • Neuropathy     last assessed: 10/16/14   • PONV (postoperative nausea and vomiting)    • Seasonal allergies    • Sleep apnea     snoring       Date: 07/31/2023       Visit # 01 Eval       Manuals        Hamstring stretching                                Neuro Re-Ed        Clamshells in sidelying        SLR flex/abd                                                Ther Ex        STS        LAQ        Heel raises from 1/2 foam        Prostretch        FSU/LSU                                Ther Activity                        Gait Training                        Modalities                          Access Code: [email or web address]  URL: [email or web address]  Date: 07/31/2023  Prepared by: Florinda Maya    Exercises  - Seated Long Arc Quad  - 1 x daily - 7 x weekly - 2 sets - 10 reps  - Straight Leg Raise with External Rotation  - 1 x daily - 7 x weekly - 2 sets - 10 reps  - Sit to Stand Without Arm Support  - 1 x daily - 7 x weekly - 2 sets - 10 reps

## 2023-08-02 ENCOUNTER — OFFICE VISIT (OUTPATIENT)
Dept: PHYSICAL THERAPY | Facility: CLINIC | Age: 58
End: 2023-08-02
Payer: COMMERCIAL

## 2023-08-02 DIAGNOSIS — M17.12 PRIMARY OSTEOARTHRITIS OF LEFT KNEE: Primary | ICD-10-CM

## 2023-08-02 PROCEDURE — 97110 THERAPEUTIC EXERCISES: CPT

## 2023-08-02 PROCEDURE — 97140 MANUAL THERAPY 1/> REGIONS: CPT

## 2023-08-02 NOTE — PROGRESS NOTES
Daily Note     Today's date: 2023  Patient name: Luis Mcdaniel  : 1965  MRN: [id number]  Referring provider: Luis Alberto Matias  Dx:   Encounter Diagnosis     ICD-10-CM    1. Primary osteoarthritis of left knee  M17.12                      Subjective: Patient reports compliance with HEP. Up/down stairs & standing from low seat remain problematic. Objective: See treatment diary below      Assessment: Tolerated treatment well. Patient demonstrated fatigue post treatment, exhibited good technique with therapeutic exercises and would benefit from continued PT      Plan: Progress treatment as tolerated. Precautions:   Past Medical History:   Diagnosis Date   • Anxiety    • Hyperlipidemia    • Hypertension    • Lyme disease 2007   • Neuropathy     last assessed: 10/16/14   • PONV (postoperative nausea and vomiting)    • Seasonal allergies    • Sleep apnea     snoring       Date: 2023      Visit # 01 Eval 2      Manuals        Hamstring stretching  R/L HS/quad/gastroc stretching                               Neuro Re-Ed        Clamshells in sidelying  R/L 10x      SLR flex/abd  R/L 10 x each                                               Ther Ex        STS  21" mat 2 x 10 reps       LAQ  R/L 20 x 5 sec hold       Heel raises from 1/2 foam  20x      Prostretch  R/L 30 sec x 4 reps       FSU/LSU  4" step 10 reps each                       NuStep  5 min warm up L3 seat #9      Ther Activity                        Gait Training                        Modalities                          Access Code: [email or web address]  URL: [email or web address]  Date: 2023  Prepared by: Tenzin Zhang    Exercises  - Seated Long Arc Quad  - 1 x daily - 7 x weekly - 2 sets - 10 reps  - Straight Leg Raise with External Rotation  - 1 x daily - 7 x weekly - 2 sets - 10 reps  - Sit to Stand Without Arm Support  - 1 x daily - 7 x weekly - 2 sets - 10 reps

## 2023-08-07 ENCOUNTER — OFFICE VISIT (OUTPATIENT)
Dept: PHYSICAL THERAPY | Facility: CLINIC | Age: 58
End: 2023-08-07
Payer: COMMERCIAL

## 2023-08-07 DIAGNOSIS — M17.12 PRIMARY OSTEOARTHRITIS OF LEFT KNEE: Primary | ICD-10-CM

## 2023-08-07 PROCEDURE — 97110 THERAPEUTIC EXERCISES: CPT

## 2023-08-07 PROCEDURE — 97112 NEUROMUSCULAR REEDUCATION: CPT

## 2023-08-07 NOTE — PROGRESS NOTES
Daily Note     Today's date: 2023  Patient name: Yue Saldana  : 1965  MRN: [id number]  Referring provider: Tracey Barrera:   Encounter Diagnosis     ICD-10-CM    1. Primary osteoarthritis of left knee  M17.12           Start Time: 81  Stop Time:   Total time in clinic (min): 40 minutes    Subjective: No issues following last session. Objective: See treatment diary below      Assessment: Tolerated treatment well. Patient demonstrated fatigue post treatment, exhibited good technique with therapeutic exercises and would benefit from continued PT      Plan: Progress treatment as tolerated. Precautions:   Past Medical History:   Diagnosis Date   • Anxiety    • Hyperlipidemia    • Hypertension    • Lyme disease 2007   • Neuropathy     last assessed: 10/16/14   • PONV (postoperative nausea and vomiting)    • Seasonal allergies    • Sleep apnea     snoring       Date: 2023     Visit # 01 Eval 2 3     Manuals        Hamstring stretching  R/L HS/quad/gastroc stretching  L HS/quad/gastroc stretching                              Neuro Re-Ed        Clamshells in sidelying  R/L 10x Supine GTB 20/20     SLR flex/abd  R/L 10 x each  L 2# AK  - 2 x 10 reps                                              Ther Ex        STS  21" mat 2 x 10 reps  20x pillow on regular chair     LAQ  R/L 20 x 5 sec hold  L 2# 20x 5 sec hold      Heel raises from 1/2 foam  20x 20x     Prostretch  R/L 30 sec x 4 reps  4x 30 sec hold (L)     FSU/LSU  4" step 10 reps each  6" step 10 x each                      NuStep  5 min warm up L3 seat #9 5 min warm up L3 seat #9     Ther Activity   Hip add ball squeeze 20 x 5 sec hold                      Gait Training                        Modalities                          Access Code: [email or web address]  URL: [email or web address]  Date: 2023  Prepared by: Brijesh Rossi    Exercises  - Seated Long Arc Quad  - 1 x daily - 7 x weekly - 2 sets - 10 reps  - Straight Leg Raise with External Rotation  - 1 x daily - 7 x weekly - 2 sets - 10 reps  - Sit to Stand Without Arm Support  - 1 x daily - 7 x weekly - 2 sets - 10 reps

## 2023-08-09 ENCOUNTER — OFFICE VISIT (OUTPATIENT)
Dept: PHYSICAL THERAPY | Facility: CLINIC | Age: 58
End: 2023-08-09
Payer: COMMERCIAL

## 2023-08-09 DIAGNOSIS — M17.12 PRIMARY OSTEOARTHRITIS OF LEFT KNEE: Primary | ICD-10-CM

## 2023-08-09 PROCEDURE — 97110 THERAPEUTIC EXERCISES: CPT

## 2023-08-09 PROCEDURE — 97112 NEUROMUSCULAR REEDUCATION: CPT

## 2023-08-09 NOTE — PROGRESS NOTES
Daily Note     Today's date: 2023  Patient name: Raya Montez  : 1965  MRN: [id number]  Referring provider: Arnold Lucas  Dx:   Encounter Diagnosis     ICD-10-CM    1. Primary osteoarthritis of left knee  M17.12           Start Time: 1651          Subjective: A little sore after last visit. Objective: See treatment diary below      Assessment: Tolerated treatment fair. Patient demonstrated fatigue post treatment, exhibited good technique with therapeutic exercises and would benefit from continued PT      Plan: Progress treatment as tolerated.        Precautions:   Past Medical History:   Diagnosis Date   • Anxiety    • Hyperlipidemia    • Hypertension    • Lyme disease 2007   • Neuropathy     last assessed: 10/16/14   • PONV (postoperative nausea and vomiting)    • Seasonal allergies    • Sleep apnea     snoring       Date: 2023    Visit # 01 [place] 2 3 4    Manuals        Hamstring stretching  R/L HS/quad/gastroc stretching  L HS/quad/gastroc stretching  R/L HS/quad/gastroc stretching                            Neuro Re-Ed        Clamshells in sidelying  R/L 10x Supine GTB 20/20 20/20 GTB supine    SLR flex/abd  R/L 10 x each  L 2# AK  - 2 x 10 reps  R/L 2# AK 2 x 10 reps each                                     Standing on aeromat marching --- --- -- R/L 2# 10 x 10 sec @rail    Ther Ex        STS  21" mat 2 x 10 reps  20x pillow on regular chair 20x pillow on regular chair    LAQ  R/L 20 x 5 sec hold  L 2# 20x 5 sec hold  -----    Heel raises from 1/2 foam  20x 20x 20x     Prostretch  R/L 30 sec x 4 reps  4x 30 sec hold (L) 4x 30 sec hold (L/R)     FSU/LSU  4" step 10 reps each  6" step 10 x each  6" step 10 x each                     NuStep  5 min warm up L3 seat #9 5 min warm up L3 seat #9 10 min L3    Ther Activity   Hip add ball squeeze 20 x 5 sec hold  ----                    Gait Training                        Modalities                          Access Code: [email or web address]  URL: [email or web address]  Date: 07/31/2023  Prepared by: Ervin Fatima    Exercises  - Seated Long Arc Quad  - 1 x daily - 7 x weekly - 2 sets - 10 reps  - Straight Leg Raise with External Rotation  - 1 x daily - 7 x weekly - 2 sets - 10 reps  - Sit to Stand Without Arm Support  - 1 x daily - 7 x weekly - 2 sets - 10 reps

## 2023-08-14 ENCOUNTER — OFFICE VISIT (OUTPATIENT)
Dept: PHYSICAL THERAPY | Facility: CLINIC | Age: 58
End: 2023-08-14
Payer: COMMERCIAL

## 2023-08-14 DIAGNOSIS — M17.12 PRIMARY OSTEOARTHRITIS OF LEFT KNEE: Primary | ICD-10-CM

## 2023-08-14 PROCEDURE — 97110 THERAPEUTIC EXERCISES: CPT | Performed by: PHYSICAL MEDICINE & REHABILITATION

## 2023-08-14 PROCEDURE — 97112 NEUROMUSCULAR REEDUCATION: CPT | Performed by: PHYSICAL MEDICINE & REHABILITATION

## 2023-08-14 NOTE — PROGRESS NOTES
Daily Note     Today's date: 2023  Patient name: Martha Lynne  : 1965  MRN: [id number]  Referring provider: Sammie Reyes  Dx:   Encounter Diagnosis     ICD-10-CM    1. Primary osteoarthritis of left knee  M17.12                      Subjective: Patient reports her knees feel great, noting that she was cleaning her home noting that she took stairs a number of times without increased pain. Objective: See treatment diary below      Assessment: Tolerated treatment fair. Patient demonstrated fatigue post treatment, exhibited good technique with therapeutic exercises and would benefit from continued PT      Plan: Progress treatment as tolerated.        Precautions:   Past Medical History:   Diagnosis Date   • Anxiety    • Hyperlipidemia    • Hypertension    • Lyme disease 2007   • Neuropathy     last assessed: 10/16/14   • PONV (postoperative nausea and vomiting)    • Seasonal allergies    • Sleep apnea     snoring       Date: 2023   Visit # 01 Eval 2 3 4 5   Manuals        Hamstring stretching  R/L HS/quad/gastroc stretching  L HS/quad/gastroc stretching  R/L HS/quad/gastroc stretching R/L 2# 10 x 10 sec @rail                           Neuro Re-Ed        Clamshells in sidelying  R/L 10x Supine GTB 20/20 20/20 GTB supine 20/20 GTB supine   SLR flex/abd  R/L 10 x each  L 2# AK  - 2 x 10 reps  R/L 2# AK 2 x 10 reps each  R/L 2# AK 2 x 10 reps each                                   Standing on aeromat marching --- --- -- R/L 2# 10 x 10 sec @rail R/L 2# 10 x 10 sec @rail   Ther Ex        STS  21" mat 2 x 10 reps  20x pillow on regular chair 20x pillow on regular chair 21" mat 2 x 10 reps    LAQ  R/L 20 x 5 sec hold  L 2# 20x 5 sec hold  ----- L 3# 20x 5 sec hold    Heel raises from 1/2 foam  20x 20x 20x     Prostretch  R/L 30 sec x 4 reps  4x 30 sec hold (L) 4x 30 sec hold (L/R)  4x 30 sec hold (L/R)    FSU/LSU  4" step 10 reps each  6" step 10 x each  6" step 10 x each  6" step 10 x each                    NuStep  5 min warm up L3 seat #9 5 min warm up L3 seat #9 10 min L3 Bike w/u   Ther Activity   Hip add ball squeeze 20 x 5 sec hold  ---- Hip add ball squeeze 20 x 5 sec hold                    Gait Training                        Modalities                          Access Code: [email or web address]  URL: [email or web address]  Date: 07/31/2023  Prepared by: Kristina Hedrick    Exercises  - Seated Long Arc Quad  - 1 x daily - 7 x weekly - 2 sets - 10 reps  - Straight Leg Raise with External Rotation  - 1 x daily - 7 x weekly - 2 sets - 10 reps  - Sit to Stand Without Arm Support  - 1 x daily - 7 x weekly - 2 sets - 10 reps

## 2023-08-16 ENCOUNTER — OFFICE VISIT (OUTPATIENT)
Dept: PHYSICAL THERAPY | Facility: CLINIC | Age: 58
End: 2023-08-16
Payer: COMMERCIAL

## 2023-08-16 DIAGNOSIS — M17.12 PRIMARY OSTEOARTHRITIS OF LEFT KNEE: Primary | ICD-10-CM

## 2023-08-16 PROCEDURE — 97110 THERAPEUTIC EXERCISES: CPT

## 2023-08-16 PROCEDURE — 97112 NEUROMUSCULAR REEDUCATION: CPT

## 2023-08-16 NOTE — PROGRESS NOTES
Daily Note     Today's date: 2023  Patient name: Shilpa Sanchez  : 1965  MRN: [id number]  Referring provider: Tracie Sánchez  Dx:   Encounter Diagnosis     ICD-10-CM    1. Primary osteoarthritis of left knee  M17.12                      Subjective: My L knee hurts today. I think I over did it this morning cleaning the house. Objective: See treatment diary below      Assessment: Tolerated treatment fair. Patient demonstrated fatigue post treatment, exhibited good technique with therapeutic exercises and would benefit from continued PT      Plan: Progress treatment as tolerated.        Precautions:   Past Medical History:   Diagnosis Date   • Anxiety    • Hyperlipidemia    • Hypertension    • Lyme disease 2007   • Neuropathy     last assessed: 10/16/14   • PONV (postoperative nausea and vomiting)    • Seasonal allergies    • Sleep apnea     snoring       Date: 2023   6Visit # 01 Eval 2 3 4 6   Manuals        Hamstring stretching  R/L HS/quad/gastroc stretching  L HS/quad/gastroc stretching  R/L HS/quad/gastroc stretching R/L HS/quad/gastroc stretching                           Neuro Re-Ed        Clamshells in sidelying  R/L 10x Supine GTB 20/20 20/20 GTB supine 10/10 GTB supine   SLR flex/abd  R/L 10 x each  L 2# AK  - 2 x 10 reps  R/L 2# AK 2 x 10 reps each  -----           [facility] resisted walking     @#7 - 4 x 5 reps                    Standing on aeromat marching --- --- -- R/L 2# 10 x 10 sec @rail R/L 2# 10 x 10 sec @rail   Ther Ex        STS  21" mat 2 x 10 reps  20x pillow on regular chair 20x pillow on regular chair 21" mat 2 x 10 reps    LAQ  R/L 20 x 5 sec hold  L 2# 20x 5 sec hold  ----- L/R 2# 20x 5 sec hold    Heel raises from 1/2 foam  20x 20x 20x  ----   Prostretch  R/L 30 sec x 4 reps  4x 30 sec hold (L) 4x 30 sec hold (L/R)  4x 30 sec hold (L/R)    FSU/LSU  4" step 10 reps each  6" step 10 x each  6" step 10 x each  6" step 10 x each NuStep  5 min warm up L3 seat #9 5 min warm up L3 seat #9 10 min L3 NuStep 10 min L3   Ther Activity   Hip add ball squeeze 20 x 5 sec hold  ---- Hip add ball squeeze 20 x 5 sec hold                    Gait Training                        Modalities                          Access Code: [email or web address]  URL: [email or web address]  Date: 07/31/2023  Prepared by: Tylor Cantu    Exercises  - Seated Long Arc Quad  - 1 x daily - 7 x weekly - 2 sets - 10 reps  - Straight Leg Raise with External Rotation  - 1 x daily - 7 x weekly - 2 sets - 10 reps  - Sit to Stand Without Arm Support  - 1 x daily - 7 x weekly - 2 sets - 10 reps

## 2023-08-21 ENCOUNTER — OFFICE VISIT (OUTPATIENT)
Dept: PHYSICAL THERAPY | Facility: CLINIC | Age: 58
End: 2023-08-21
Payer: COMMERCIAL

## 2023-08-21 DIAGNOSIS — M17.12 PRIMARY OSTEOARTHRITIS OF LEFT KNEE: Primary | ICD-10-CM

## 2023-08-21 PROCEDURE — 97110 THERAPEUTIC EXERCISES: CPT | Performed by: PHYSICAL MEDICINE & REHABILITATION

## 2023-08-21 PROCEDURE — 97112 NEUROMUSCULAR REEDUCATION: CPT | Performed by: PHYSICAL MEDICINE & REHABILITATION

## 2023-08-21 NOTE — PROGRESS NOTES
Daily Note     Today's date: 2023  Patient name: Rodney Middleton  : 1965  MRN: [id number]  Referring provider: Sydney Patino  Dx:   Encounter Diagnosis     ICD-10-CM    1. Primary osteoarthritis of left knee  M17.12                      Subjective: Patient reports some soreness in her left knee following a very active weekend. Patient states she did a lot of standing and walking on uneven surfaces. Objective: See treatment diary below      Assessment: Tolerated treatment fair. Patient demonstrated fatigue post treatment, exhibited good technique with therapeutic exercises and would benefit from continued PT      Plan: Progress treatment as tolerated.        Precautions:   Past Medical History:   Diagnosis Date   • Anxiety    • Hyperlipidemia    • Hypertension    • Lyme disease 2007   • Neuropathy     last assessed: 10/16/14   • PONV (postoperative nausea and vomiting)    • Seasonal allergies    • Sleep apnea     snoring       Date: 2023   6Visit # 7 2 3 4 6   Manuals        Hamstring stretching R/L HS/quad/gastroc stretching R/L HS/quad/gastroc stretching  L HS/quad/gastroc stretching  R/L HS/quad/gastroc stretching R/L HS/quad/gastroc stretching                           Neuro Re-Ed        Clamshells in sidelying 20/20 GTB supine R/L 10x Supine GTB 20/20 20/20 GTB supine 10/10 GTB supine   SLR flex/abd R/L 2# AK 2 x 10 reps each R/L 10 x each  L 2# AK  - 2 x 10 reps  R/L 2# AK 2 x 10 reps each  -----           [place] resisted walking @#12.5 - 4 x 5 reps     @#7 - 4 x 5 reps                    Standing on aeromat marching R/L 2# 10 x 10 sec @rail --- -- R/L 2# 10 x 10 sec @rail R/L 2# 10 x 10 sec @rail   Ther Ex        STS 21" mat 2 x 10 reps  21" mat 2 x 10 reps  20x pillow on regular chair 20x pillow on regular chair 21" mat 2 x 10 reps    LAQ L/R 2# 20x 5 sec hold  R/L 20 x 5 sec hold  L 2# 20x 5 sec hold  ----- L/R 2# 20x 5 sec hold    Heel raises from  foam 20x  20x 20x 20x  ----   Prostretch 4x 30 sec hold (L/R)  R/L 30 sec x 4 reps  4x 30 sec hold (L) 4x 30 sec hold (L/R)  4x 30 sec hold (L/R)    FSU/LSU 6" step 20 x each  4" step 10 reps each  6" step 10 x each  6" step 10 x each  6" step 10 x each                    NuStep NuStep 10 min L3 5 min warm up L3 seat #9 5 min warm up L3 seat #9 10 min L3 NuStep 10 min L3   Ther Activity   Hip add ball squeeze 20 x 5 sec hold  ---- Hip add ball squeeze 20 x 5 sec hold                    Gait Training                        Modalities                          Access Code: [email or web address]  URL: [email or web address]  Date: 07/31/2023  Prepared by: Yumiko Farrell    Exercises  - Seated Long Arc Quad  - 1 x daily - 7 x weekly - 2 sets - 10 reps  - Straight Leg Raise with External Rotation  - 1 x daily - 7 x weekly - 2 sets - 10 reps  - Sit to Stand Without Arm Support  - 1 x daily - 7 x weekly - 2 sets - 10 reps

## 2023-08-23 ENCOUNTER — OFFICE VISIT (OUTPATIENT)
Dept: PHYSICAL THERAPY | Facility: CLINIC | Age: 58
End: 2023-08-23
Payer: COMMERCIAL

## 2023-08-23 DIAGNOSIS — M17.12 PRIMARY OSTEOARTHRITIS OF LEFT KNEE: Primary | ICD-10-CM

## 2023-08-23 PROCEDURE — 97110 THERAPEUTIC EXERCISES: CPT | Performed by: PHYSICAL MEDICINE & REHABILITATION

## 2023-08-23 PROCEDURE — 97112 NEUROMUSCULAR REEDUCATION: CPT | Performed by: PHYSICAL MEDICINE & REHABILITATION

## 2023-08-23 NOTE — PROGRESS NOTES
Daily Note     Today's date: 2023  Patient name: Suad Samuels  : 1965  MRN: [id number]  Referring provider: Johnny Zayas  Dx:   Encounter Diagnosis     ICD-10-CM    1. Primary osteoarthritis of left knee  M17.12                      Subjective: Patient reports that her knee is feeling quite good today, noting that she gets flair ups when she is standing/walking too much. Objective: See treatment diary below      Assessment: Tolerated treatment fair. Patient limiting end range knee extension addressed with manual intervention. Patient demonstrated fatigue post treatment, exhibited good technique with therapeutic exercises and would benefit from continued PT      Plan: Progress treatment as tolerated.        Precautions:   Past Medical History:   Diagnosis Date   • Anxiety    • Hyperlipidemia    • Hypertension    • Lyme disease 2007   • Neuropathy     last assessed: 10/16/14   • PONV (postoperative nausea and vomiting)    • Seasonal allergies    • Sleep apnea     snoring       Date: 2023 8 8/   6Visit # 7 8 3 4 6   Manuals        Hamstring stretching R/L HS/quad/gastroc stretching R/L HS/quad/gastroc stretching  L HS/quad/gastroc stretching  R/L HS/quad/gastroc stretching R/L HS/quad/gastroc stretching                           Neuro Re-Ed        Clamshells in sidelying 20/20 GTB supine R/L 10x Supine GTB 20/20 20/20 GTB supine 10/10 GTB supine   SLR flex/abd R/L 2# AK 2 x 10 reps each R/L 10 x each  L 2# AK  - 2 x 10 reps  R/L 2# AK 2 x 10 reps each  -----           Maximiliano resisted walking @#12.5 - 4 x 5 reps  @#12.5 - 4 x 5 reps   @#7 - 4 x 5 reps                    Standing on aeromat marching R/L 2# 10 x 10 sec @rail R/L 4# 10 x 10 sec @rail -- R/L 2# 10 x 10 sec @rail R/L 2# 10 x 10 sec @rail   Ther Ex        STS 21" mat 2 x 10 reps  19" mat 2 x 10 reps  20x pillow on regular chair 20x pillow on regular chair 21" mat 2 x 10 reps    LAQ L/R 2# 20x 5 sec hold  L/R 4# 20x 5 sec hold  L 2# 20x 5 sec hold  ----- L/R 2# 20x 5 sec hold    Heel raises from 1/2 foam 20x  20x 20x 20x  ----   Prostretch 4x 30 sec hold (L/R)  R/L 30 sec x 4 reps  4x 30 sec hold (L) 4x 30 sec hold (L/R)  4x 30 sec hold (L/R)    FSU/LSU 6" step 20 x each  6" step 20 x each  6" step 10 x each  6" step 10 x each  6" step 10 x each                    NuStep NuStep 10 min L3 5 min warm up L3 seat #9 5 min warm up L3 seat #9 10 min L3 NuStep 10 min L3   Ther Activity   Hip add ball squeeze 20 x 5 sec hold  ---- Hip add ball squeeze 20 x 5 sec hold                    Gait Training                        Modalities                          Access Code: [email or web address]  URL: [email or web address]  Date: 07/31/2023  Prepared by: Amirah Blackmon    Exercises  - Seated Long Arc Quad  - 1 x daily - 7 x weekly - 2 sets - 10 reps  - Straight Leg Raise with External Rotation  - 1 x daily - 7 x weekly - 2 sets - 10 reps  - Sit to Stand Without Arm Support  - 1 x daily - 7 x weekly - 2 sets - 10 reps

## 2023-08-29 ENCOUNTER — OFFICE VISIT (OUTPATIENT)
Dept: PHYSICAL THERAPY | Facility: CLINIC | Age: 58
End: 2023-08-29
Payer: COMMERCIAL

## 2023-08-29 DIAGNOSIS — M17.12 PRIMARY OSTEOARTHRITIS OF LEFT KNEE: Primary | ICD-10-CM

## 2023-08-29 PROCEDURE — 97112 NEUROMUSCULAR REEDUCATION: CPT | Performed by: PHYSICAL MEDICINE & REHABILITATION

## 2023-08-29 PROCEDURE — 97110 THERAPEUTIC EXERCISES: CPT | Performed by: PHYSICAL MEDICINE & REHABILITATION

## 2023-08-29 NOTE — PROGRESS NOTES
Daily Note     Today's date: 2023  Patient name: Ariel Martinez  : 1965  MRN: [id number]  Referring provider: Dante Granger  Dx:   Encounter Diagnosis     ICD-10-CM    1. Primary osteoarthritis of left knee  M17.12                      Subjective: Patient reports that her knee feels good with general activity however is sore [place]. Objective: See treatment diary below      Assessment: Tolerated treatment fair. Additional exercises to address instability on uneven surfaces. Patient demonstrated fatigue post treatment, exhibited good technique with therapeutic exercises and would benefit from continued PT      Plan: Progress treatment as tolerated.        Precautions:   Past Medical History:   Diagnosis Date   • Anxiety    • Hyperlipidemia    • Hypertension    • Lyme disease 2007   • Neuropathy     last assessed: 10/16/14   • PONV (postoperative nausea and vomiting)    • Seasonal allergies    • Sleep apnea     snoring       Date: 2023 87 8/9    Visit # 7 8 9 4 6   Manuals        Hamstring stretching R/L HS/quad/gastroc stretching R/L HS/quad/gastroc stretching  L HS/quad/gastroc stretching  R/L HS/quad/gastroc stretching R/L HS/quad/gastroc stretching                           Neuro Re-Ed        Clamshells in sidelying 20/20 GTB supine R/L 10x R/L 10x 20/20 GTB supine 10/10 GTB supine   SLR flex/abd R/L 2# AK 2 x 10 reps each R/L 10 x each  L 2# AK  - 2 x 10 reps  R/L 2# AK 2 x 10 reps each  -----           Max[facility]il[facility] resisted walking @#12.5 - 4 x 5 reps   @#12.5 - 4 x 5 reps  @#7 - 4 x 5 reps    Marching on beam   5 laps             Standing on aeromat marching R/L 2# 10 x 10 sec @rail R/L 2# 10 x 10 sec @rail R/L 2# 10 x 10 sec @rail R/L 2# 10 x 10 sec @rail R/L 2# 10 x 10 sec @rail   Ther Ex        STS 21" mat 2 x 10 reps  19" mat 2 x 10 reps  From 19" 20x 20x pillow on regular chair 21" mat 2 x 10 reps    LAQ L/R 2# 20x 5 sec hold  L/R 4# 20x 5 sec hold  L 4# 20x 5 sec hold  ----- L/R 2# 20x 5 sec hold    Heel raises from 1/2 foam 20x  20x 20x 20x  ----   Prostretch 4x 30 sec hold (L/R)  R/L 30 sec x 4 reps  4x 30 sec hold (L) 4x 30 sec hold (L/R)  4x 30 sec hold (L/R)    FSU/LSU 6" step 20 x each  6" step 20 x each  6" step 10 x each  6" step 10 x each  6" step 10 x each                    NuStep NuStep 10 min L3 5 min warm up L3 seat #9 5 min warm up L3 seat #9 10 min L3 NuStep 10 min L3   Ther Activity    ---- Hip add ball squeeze 20 x 5 sec hold                    Gait Training                        Modalities                          Access Code: [email or web address]  URL: [email or web address]  Date: 07/31/2023  Prepared by: Reji Valle    Exercises  - Seated Long Arc Quad  - 1 x daily - 7 x weekly - 2 sets - 10 reps  - Straight Leg Raise with External Rotation  - 1 x daily - 7 x weekly - 2 sets - 10 reps  - Sit to Stand Without Arm Support  - 1 x daily - 7 x weekly - 2 sets - 10 reps

## 2023-08-31 ENCOUNTER — OFFICE VISIT (OUTPATIENT)
Dept: PHYSICAL THERAPY | Facility: CLINIC | Age: 58
End: 2023-08-31
Payer: COMMERCIAL

## 2023-08-31 DIAGNOSIS — M17.12 PRIMARY OSTEOARTHRITIS OF LEFT KNEE: Primary | ICD-10-CM

## 2023-08-31 PROCEDURE — 97112 NEUROMUSCULAR REEDUCATION: CPT | Performed by: PHYSICAL MEDICINE & REHABILITATION

## 2023-08-31 PROCEDURE — 97110 THERAPEUTIC EXERCISES: CPT | Performed by: PHYSICAL MEDICINE & REHABILITATION

## 2023-08-31 NOTE — PROGRESS NOTES
Discharge    Today's date: 2023  Patient name: Shyam Minaya  : 1965  MRN: [id number]  Referring provider: Alejandra Lassiter  Dx:   Encounter Diagnosis     ICD-10-CM    1. Primary osteoarthritis of left knee  M17.12                      Subjective: Patient reports that her knee is feeling good today, stating that overall she is doing well however she does have some instances of intermittent instability and soreness depending on what she's doing. Patient notes that she does get instances of pain when walking on uneven surfaces. Patient notes at this time she is overall pleased with her progress and confident with her discharge to HEP      Objective: See treatment diary below    MMT:    Right  Left    Hip Flexion:   4+/5  4+/5  Hip Abduction:   4+/5  4+/5  Hip Adduction:   5/5  5/5  Hip Extension:   4/5  4/5  Knee Flexion:   5/5  5/5  Knee Extension:  5/5  5/5  Ankle Dorsiflexion:  5/5  5/5  Ankle Plantarflexion:  5/5  5/5    AROM:    Right  Left    Knee Flexion:   140  132  Knee Extension:  0  -3    EDEMA:   Right  Left    Joint Line:   39.5 cm 40 cm     AMBULATION:  No deficit     PALPATION:  Slight tenderness to palpation along L>R medial joint line    FLEXIBILITY:  Slight deficit noted in bilateral hamstring flexibility    SPECIAL TESTS:  NT      Assessment: Patient has been consistent with attendance to therapy, motivated during each treatment session, and shows compliance with HEP. Patient has achieved maximum benefit from skilled formal physical therapy intervention and is appropriate for discharge with comprehensive HEP. Plan: Progress treatment as tolerated.        Precautions:   Past Medical History:   Diagnosis Date   • Anxiety    • Hyperlipidemia    • Hypertension    • Lyme disease 2007   • Neuropathy     last assessed: 10/16/14   • PONV (postoperative nausea and vomiting)    • Seasonal allergies    • Sleep apnea     snoring       Date: 2023 Visit # 7 8 9 10 6   Manuals        Hamstring stretching R/L HS/quad/gastroc stretching R/L HS/quad/gastroc stretching  L HS/quad/gastroc stretching  R/L HS/quad/gastroc stretching R/L HS/quad/gastroc stretching                           Neuro Re-Ed        Clamshells in sidelying 20/20 GTB supine R/L 10x R/L 10x 20/20 GTB supine 10/10 GTB supine   SLR flex/abd R/L 2# AK 2 x 10 reps each R/L 10 x each  L 2# AK  - 2 x 10 reps  R/L 2# AK 2 x 10 reps each  -----           Maximil[facility] resisted walking @#12.5 - 4 x 5 reps   @#12.5 - 4 x 5 reps @#12.5 - 4 x 5 reps @#7 - 4 x 5 reps    Marching on beam   5 laps             Standing on aeromat marching R/L 2# 10 x 10 sec @rail R/L 2# 10 x 10 sec @rail R/L 2# 10 x 10 sec @rail R/L 2# 10 x 10 sec @rail R/L 2# 10 x 10 sec @rail   Ther Ex        STS 21" mat 2 x 10 reps  19" mat 2 x 10 reps  From 19" 20x 20x pillow on regular chair 21" mat 2 x 10 reps    LAQ L/R 2# 20x 5 sec hold  L/R 4# 20x 5 sec hold  L 4# 20x 5 sec hold  ----- L/R 2# 20x 5 sec hold    Heel raises from 1/2 foam 20x  20x 20x 20x  ----   Prostretch 4x 30 sec hold (L/R)  R/L 30 sec x 4 reps  4x 30 sec hold (L) 4x 30 sec hold (L/R)  4x 30 sec hold (L/R)    FSU/LSU 6" step 20 x each  6" step 20 x each  6" step 10 x each  6" step 10 x each  6" step 10 x each                    NuStep NuStep 10 min L3 5 min warm up L3 seat #9 5 min warm up L3 seat #9 5 min warm up L3 seat #9 NuStep 10 min L3   Ther Activity    ---- Hip add ball squeeze 20 x 5 sec hold                    Gait Training                        Modalities                          Access Code: [email or web address]  URL: [email or web address]  Date: 08/31/2023  Prepared by: Karen Murcia    Exercises  - Seated Long Arc Quad  - 1 x daily - 4 x weekly - 2 sets - 10 reps  - Supine Active Straight Leg Raise  - 1 x daily - 4 x weekly - 2 sets - 10 reps  - Sit to Stand Without Arm Support  - 1 x daily - 4 x weekly - 2 sets - 10 reps  - Step Up  - 1 x daily - 4 x weekly - 2 sets - 10 reps  - Lateral Step Up  - 1 x daily - 4 x weekly - 2 sets - 10 reps  - Heel Raises with Counter Support  - 1 x daily - 4 x weekly - 2 sets - 10 reps  - Clamshell with Resistance  - 1 x daily - 4 x weekly - 2 sets - 10 reps  - Single Leg Stance  - 1 x daily - 4 x weekly - 4 reps - 20 sec hold yes

## 2023-09-19 ENCOUNTER — OFFICE VISIT (OUTPATIENT)
Dept: OBGYN CLINIC | Facility: CLINIC | Age: 58
End: 2023-09-19
Payer: COMMERCIAL

## 2023-09-19 VITALS
BODY MASS INDEX: 30.76 KG/M2 | HEART RATE: 52 BPM | WEIGHT: 196 LBS | DIASTOLIC BLOOD PRESSURE: 78 MMHG | HEIGHT: 67 IN | SYSTOLIC BLOOD PRESSURE: 133 MMHG

## 2023-09-19 DIAGNOSIS — M17.12 PRIMARY OSTEOARTHRITIS OF LEFT KNEE: Primary | ICD-10-CM

## 2023-09-19 PROCEDURE — 99212 OFFICE O/P EST SF 10 MIN: CPT | Performed by: ORTHOPAEDIC SURGERY

## 2023-09-19 NOTE — PROGRESS NOTES
Ortho Sports Medicine Follow Up Patient Visit     Assesment:   60 y.o. female with left primary knee osteoarthritis    Plan:    Patient's had a good response to physical therapy and a corticosteroid injection at last visit. She continues to have good pain relief at this time. I recommend she continue physical therapy and continue home exercises when she is discharged from therapy. We discussed that she may be able to really see if another steroid injection 3-4 at least 3 to 4 months after the previous and there is other options for injections as well. We also discussed an  brace which she would prefer not to use at this time. She can follow-up as needed in the future if her pain returns. Chief Complaint   Patient presents with   • Left Knee - Follow-up       History of Present Illness: The patient is a 60 y.o. female follows up for her knee arthritis. She has been doing physical therapy. This has been very helpful. She also has a good response from an injection last visit. She continues to have pain mostly on the medial side of the knee. She does not have any significant locking but she does notice crepitus. She does not have any swelling at this time. Denies any numbness or tingling. Knee Surgical History:  None    Past Medical, Social and Family History:  Past Medical History:   Diagnosis Date   • Anxiety    • Hyperlipidemia    • Hypertension    • Lyme disease 07/13/2007   • Neuropathy     last assessed: 10/16/14   • PONV (postoperative nausea and vomiting)    • Seasonal allergies    • Sleep apnea     snoring     Past Surgical History:   Procedure Laterality Date   • BREAST SURGERY     • MYOMECTOMY     • SKIN BIOPSY      SCC, BCC   • VARICOSE VEIN SURGERY      Ligation     Allergies   Allergen Reactions   • Codeine Nausea Only     Reaction Date: [id number];  Annotation - [id number]: dizzy  /jjw     Current Outpatient Medications on File Prior to Visit   Medication Sig Dispense Refill • Calcium Carb-Cholecalciferol 600-500 MG-UNIT CAPS Take by mouth     • Cholecalciferol (VITAMIN D3) 1000 units CAPS Take by mouth     • citalopram (CeleXA) 20 mg tablet TAKE ONE TABLET BY MOUTH EVERY DAY 30 tablet 2   • Diclofenac Sodium (VOLTAREN) 1 % Apply 2 g topically 4 (four) times a day 100 g 0   • Docosahexaenoic Acid (DHA) 200 MG CAPS Take by mouth     • Elderberry 500 MG CAPS Take by mouth     • ezetimibe (ZETIA) 10 mg tablet Take 1 tablet (10 mg total) by mouth daily 90 tablet 3   • Magnesium 200 MG CHEW Chew     • metoprolol succinate (TOPROL-XL) 25 mg 24 hr tablet TAKE ONE TABLET BY MOUTH EVERY DAY 30 tablet 2   • Omega-3 Fatty Acids (FISH OIL) 1,000 mg Take by mouth     • Probiotic Product (PROBIOTIC-10) CAPS Take by mouth     • zinc gluconate 50 mg tablet Take 50 mg by mouth daily     • meloxicam (Mobic) 15 mg tablet Take 1 tablet (15 mg total) by mouth daily 30 tablet 0   • saccharomyces boulardii (FLORASTOR) 250 mg capsule Take 250 mg by mouth 2 (two) times a day       No current facility-administered medications on file prior to visit.      Social History     Socioeconomic History   • Marital status: /Civil Union     Spouse name: Not on file   • Number of children: Not on file   • Years of education: Not on file   • Highest education level: Not on file   Occupational History   • Not on file   Tobacco Use   • Smoking status: Former     Packs/day: 0.25     Years: 25.00     Total pack years: 6.25     Types: Cigarettes     Start date: 1976     Quit date:      Years since quittin.7   • Smokeless tobacco: Former   • Tobacco comments:     off and onsmoker   Vaping Use   • Vaping Use: Every day   • Substances: THC   Substance and Sexual Activity   • Alcohol use: Yes     Comment: occasional, social   • Drug use: Yes     Types: Marijuana     Comment: vape    • Sexual activity: Yes   Other Topics Concern   • Not on file   Social History Narrative   • Not on file     Social Determinants of Health     Financial Resource Strain: Not on file   Food Insecurity: Not on file   Transportation Needs: Not on file   Physical Activity: Not on file   Stress: Not on file   Social Connections: Not on file   Intimate Partner Violence: Not on file   Housing Stability: Not on file         I have reviewed the past medical, surgical, social and family history, medications and allergies as documented in the EMR. Review of systems: ROS is negative other than that noted in the HPI. Constitutional: Negative for fatigue and fever. HENT: Negative for sore throat. Respiratory: Negative for shortness of breath. Cardiovascular: Negative for chest pain. Gastrointestinal: Negative for abdominal pain. Endocrine: Negative for cold intolerance and heat intolerance. Genitourinary: Negative for flank pain. Musculoskeletal: Negative for back pain. Skin: Negative for rash. Allergic/Immunologic: Negative for immunocompromised state. Neurological: Negative for dizziness. Psychiatric/Behavioral: Negative for agitation. Physical Exam:    Blood pressure 133/78, pulse (!) 52, height 5' 7" (1.702 m), weight 88.9 kg (196 lb). General/Constitutional: NAD, well developed, well nourished  HENT: Normocephalic, atraumatic  CV: Intact distal pulses, regular rate  Resp: No respiratory distress or labored breathing  Lymphatic: No lymphadenopathy palpated  Neuro: Alert and Oriented x 3, no focal deficits  Psych: Normal mood, normal affect  Skin: Warm, dry, no rashes, no erythema      Knee Exam:   No significant skin lesions or deformity. Range of motion from 0 to 130. Limited patellar mobility. Mild medial joint line tenderness. No lateral joint line tenderness. Knee is stable to varus stress, Lachman, and posterior drawer. There is valgus gapping with a firm endpoint on exam. Neurovascularly intact distally      Knee Imaging    X-rays of the knees reviewed and interpreted today. These show no acute fractures. Medial compartment degenerative changes bilaterally, including joint space narrowing, osteophytosis, and subchondral sclerosis. Patella is well centered on the trochlea.

## 2023-10-20 DIAGNOSIS — F41.9 ANXIETY AND DEPRESSION: ICD-10-CM

## 2023-10-20 DIAGNOSIS — I10 ESSENTIAL HYPERTENSION: ICD-10-CM

## 2023-10-20 DIAGNOSIS — F32.A ANXIETY AND DEPRESSION: ICD-10-CM

## 2023-10-20 RX ORDER — CITALOPRAM 20 MG/1
TABLET ORAL
Qty: 30 TABLET | Refills: 2 | Status: SHIPPED | OUTPATIENT
Start: 2023-10-20

## 2023-10-20 RX ORDER — METOPROLOL SUCCINATE 25 MG/1
TABLET, EXTENDED RELEASE ORAL
Qty: 30 TABLET | Refills: 2 | Status: SHIPPED | OUTPATIENT
Start: 2023-10-20

## 2023-11-06 ENCOUNTER — OFFICE VISIT (OUTPATIENT)
Dept: FAMILY MEDICINE CLINIC | Facility: CLINIC | Age: 58
End: 2023-11-06
Payer: COMMERCIAL

## 2023-11-06 VITALS
HEART RATE: 60 BPM | WEIGHT: 197.4 LBS | DIASTOLIC BLOOD PRESSURE: 82 MMHG | BODY MASS INDEX: 30.98 KG/M2 | HEIGHT: 67 IN | OXYGEN SATURATION: 96 % | TEMPERATURE: 98 F | SYSTOLIC BLOOD PRESSURE: 122 MMHG | RESPIRATION RATE: 16 BRPM

## 2023-11-06 DIAGNOSIS — Z13.29 SCREENING FOR THYROID DISORDER: ICD-10-CM

## 2023-11-06 DIAGNOSIS — Z00.00 ANNUAL PHYSICAL EXAM: Primary | ICD-10-CM

## 2023-11-06 DIAGNOSIS — I10 ESSENTIAL HYPERTENSION: ICD-10-CM

## 2023-11-06 DIAGNOSIS — E78.2 MIXED HYPERLIPIDEMIA: ICD-10-CM

## 2023-11-06 DIAGNOSIS — H54.7 DECREASED VISUAL ACUITY: ICD-10-CM

## 2023-11-06 DIAGNOSIS — Z13.0 SCREENING FOR DEFICIENCY ANEMIA: ICD-10-CM

## 2023-11-06 DIAGNOSIS — Z13.1 SCREENING FOR DIABETES MELLITUS: ICD-10-CM

## 2023-11-06 DIAGNOSIS — M25.50 ARTHRALGIA, UNSPECIFIED JOINT: ICD-10-CM

## 2023-11-06 DIAGNOSIS — Z12.4 CERVICAL CANCER SCREENING: ICD-10-CM

## 2023-11-06 DIAGNOSIS — Z23 ENCOUNTER FOR IMMUNIZATION: ICD-10-CM

## 2023-11-06 LAB
SL AMB  POCT GLUCOSE, UA: ABNORMAL
SL AMB LEUKOCYTE ESTERASE,UA: 75
SL AMB POCT BILIRUBIN,UA: ABNORMAL
SL AMB POCT BLOOD,UA: ABNORMAL
SL AMB POCT CLARITY,UA: CLEAR
SL AMB POCT COLOR,UA: YELLOW
SL AMB POCT KETONES,UA: ABNORMAL
SL AMB POCT NITRITE,UA: ABNORMAL
SL AMB POCT PH,UA: 8
SL AMB POCT SPECIFIC GRAVITY,UA: 1
SL AMB POCT URINE PROTEIN: ABNORMAL
SL AMB POCT UROBILINOGEN: ABNORMAL

## 2023-11-06 PROCEDURE — 90686 IIV4 VACC NO PRSV 0.5 ML IM: CPT | Performed by: FAMILY MEDICINE

## 2023-11-06 PROCEDURE — 99396 PREV VISIT EST AGE 40-64: CPT | Performed by: FAMILY MEDICINE

## 2023-11-06 PROCEDURE — 81003 URINALYSIS AUTO W/O SCOPE: CPT | Performed by: FAMILY MEDICINE

## 2023-11-06 PROCEDURE — 90471 IMMUNIZATION ADMIN: CPT | Performed by: FAMILY MEDICINE

## 2023-11-06 NOTE — PROGRESS NOTES
[facility] HEALTH MAINTENANCE OFFICE VISIT  [facility] - [facility]    NAME: Fan Guillaume  AGE: 60 y.o. SEX: female  : 1965     DATE: 2023    Assessment and Plan     1. Annual physical exam  -     POCT urine dip auto non-scope  -     Lipase; Future  -     CBC; Future  -     Comprehensive metabolic panel; Future  -     Hemoglobin A1C; Future  -     Lipid Panel with Direct LDL reflex; Future  -     Magnesium; Future  -     TSH, 3rd generation; Future    2. Encounter for immunization  -     influenza vaccine, quadrivalent, 0.5 mL, preservative-free, for adult and pediatric patients 6 mos+ (AFLURIA, [address], FLULAVAL, FLUZONE)  -     Ambulatory Referral to Ophthalmology; Future    3. Essential hypertension  -     Ambulatory Referral to Ophthalmology; Future  -     Ambulatory Referral to Ophthalmology; Future  -     Comprehensive metabolic panel; Future  -     Magnesium; Future    4. Decreased visual acuity  -     Ambulatory Referral to Ophthalmology; Future    5. Arthralgia, unspecified joint  -     Comprehensive metabolic panel; Future  -     TSH, 3rd generation; Future    6. Mixed hyperlipidemia  -     Lipase; Future  -     Comprehensive metabolic panel; Future  -     Lipid Panel with Direct LDL reflex; Future  -     TSH, 3rd generation; Future    7. Screening for diabetes mellitus  -     Comprehensive metabolic panel; Future  -     Hemoglobin A1C; Future    8. Screening for thyroid disorder  -     TSH, 3rd generation; Future    9. Screening for deficiency anemia  -     CBC; Future    10. Cervical cancer screening  -     Liquid-based pap, screening    11.  BMI 30.0-30.9,adult        Patient Counseling:   Nutrition: Stressed importance of a well balanced diet, moderation of sodium/saturated fat, caloric balance and sufficient intake of fiber  Exercise: Stressed the importance of regular exercise with a goal of 150 minutes per week  Dental Health: Discussed daily flossing and brushing and regular dental visits   Alcohol Use:  Recommended moderation of alcohol intake  Injury Prevention: Discussed Safety Belts, Safety Helmets, and Smoke Detectors    Immunizations reviewed: Risks and Benefits discussed  Discussed benefits of:  Colon Cancer Screening, Mammogram , Cervical Cancer screening, and Screening labs. BMI Counseling: Body mass index is 30.92 kg/m². Discussed with patient's BMI with her. The BMI is above normal. Nutrition recommendations include 3-5 servings of fruits/vegetables daily, moderation in carbohydrate intake, increasing intake of lean protein, reducing intake of saturated fat and trans fat, and reducing intake of cholesterol. Exercise recommendations include exercising 3-5 times per week and strength training exercises. Chief Complaint     Chief Complaint   Patient presents with   • Physical Exam     pap       History of Present Illness     HPI    Well Adult Physical   Patient here for a comprehensive physical exam.      Diet and Physical Activity  Diet: well balanced diet  Exercise: intermittently      Depression Screen  PHQ-2/9 Depression Screening    Little interest or pleasure in doing things: 0 - not at all  Feeling down, depressed, or hopeless: 0 - not at all  PHQ-2 Score: 0  PHQ-2 Interpretation: Negative depression screen          General Health  Hearing: Normal:  bilateral  Vision: vision problems: and wears glasses  Dental: regular dental visits    Reproductive Health  Post-menopausal       The following portions of the patient's history were reviewed and updated as appropriate: allergies, current medications, past family history, past medical history, past social history, past surgical history and problem list.    Review of Systems     Review of Systems   Constitutional:  Positive for fatigue. Negative for fever. HENT:  Negative for sinus pressure. Eyes:  Negative for photophobia and pain. Respiratory: Negative. Cardiovascular: Negative. Gastrointestinal:  Positive for abdominal pain. Negative for nausea and vomiting. GERD   Musculoskeletal:  Positive for arthralgias and neck pain. Allergic/Immunologic: Positive for environmental allergies. Neurological:  Positive for headaches. Negative for dizziness. Psychiatric/Behavioral:  Positive for decreased concentration and sleep disturbance. Negative for hallucinations and self-injury.         Past Medical History     Past Medical History:   Diagnosis Date   • Allergic    • Anxiety    • HL (hearing loss)    • Hyperlipidemia    • Hypertension    • Lyme disease 2007   • Neuropathy     last assessed: 10/16/14   • PONV (postoperative nausea and vomiting)    • Seasonal allergies    • Sleep apnea     snoring   • Visual impairment        Past Surgical History     Past Surgical History:   Procedure Laterality Date   • BREAST SURGERY     • MYOMECTOMY     • SKIN BIOPSY      SCC, BCC   • VARICOSE VEIN SURGERY      Ligation       Social History     Social History     Socioeconomic History   • Marital status: /Civil Union     Spouse name: None   • Number of children: None   • Years of education: None   • Highest education level: None   Occupational History   • None   Tobacco Use   • Smoking status: Former     Packs/day: 0.25     Years: 25.00     Total pack years: 6.25     Types: Cigarettes     Start date: 1976     Quit date:      Years since quittin.8   • Smokeless tobacco: Former   • Tobacco comments:     off and onsmoker   Vaping Use   • Vaping Use: Every day   • Substances: THC   Substance and Sexual Activity   • Alcohol use: Yes     Comment: occasional, social   • Drug use: Yes     Types: Marijuana     Comment: vape    • Sexual activity: Yes   Other Topics Concern   • None   Social History Narrative   • None     Social Determinants of Health     Financial Resource Strain: Not on file   Food Insecurity: Not on file   Transportation Needs: Not on file   Physical Activity: Not on file   Stress: Not on file   Social Connections: Not on file   Intimate Partner Violence: Not on file   Housing Stability: Not on file       Family History     Family History   Problem Relation Age of Onset   • Heart disease Mother    • Heart disease Father    • Hypertension Father    • Breast cancer Sister    • Atrial fibrillation Brother    • Alzheimer's disease Maternal Grandmother         dementia   • Breast cancer Maternal Grandmother    • Colon cancer Maternal Grandfather    • Alzheimer's disease Maternal Uncle         dementia   • Heart disease Other    • Breast cancer Other        Current Medications       Current Outpatient Medications:   •  Bioflavonoid Products (QUERCETIN COMPLEX IMMUNE PO), Take 1,000 mg by mouth, Disp: , Rfl:   •  Calcium Carb-Cholecalciferol 600-500 MG-UNIT CAPS, Take by mouth, Disp: , Rfl:   •  Cholecalciferol (VITAMIN D3) 1000 units CAPS, Take by mouth, Disp: , Rfl:   •  citalopram (CeleXA) 20 mg tablet, TAKE ONE TABLET BY MOUTH EVERY DAY, Disp: 30 tablet, Rfl: 2  •  Docosahexaenoic Acid (DHA) 200 MG CAPS, Take by mouth, Disp: , Rfl:   •  ezetimibe (ZETIA) 10 mg tablet, Take 1 tablet (10 mg total) by mouth daily, Disp: 90 tablet, Rfl: 3  •  metoprolol succinate (TOPROL-XL) 25 mg 24 hr tablet, TAKE ONE TABLET BY MOUTH EVERY DAY, Disp: 30 tablet, Rfl: 2  •  Omega-3 Fatty Acids (FISH OIL) 1,000 mg, Take by mouth, Disp: , Rfl:   •  Probiotic Product (PROBIOTIC-10) CAPS, Take by mouth, Disp: , Rfl:   •  zinc gluconate 50 mg tablet, Take 50 mg by mouth daily, Disp: , Rfl:      Allergies     Allergies   Allergen Reactions   • Codeine Nausea Only     Reaction Date: [id number];  Annotation - [id number]: dizzy  /jjw     Immunization History   Administered Date(s) Administered   • COVID-19 MODERNA VACC 0.5 ML IM 03/11/2021, 03/11/2021, 04/13/2021, 04/13/2021, 11/05/2021   • Influenza Injectable, MDCK, Preservative Free, Quadrivalent 10/08/2019   • Influenza Quadrivalent Preservative Free 3 years and older IM 09/18/2017   • Influenza, injectable, quadrivalent, preservative free 0.5 mL 10/05/2020, 11/06/2023   • Influenza, recombinant, quadrivalent,injectable, preservative free 10/22/2021, 11/02/2022   • Influenza, seasonal, injectable 10/16/2014, 10/09/2015, 11/30/2016   • Tdap 07/25/2009         Objective     /82 (BP Location: Left arm, Patient Position: Sitting, Cuff Size: Large)   Pulse 60   Temp 98 °F (36.7 °C)   Resp 16   Ht 5' 7" (1.702 m)   Wt 89.5 kg (197 lb 6.4 oz)   SpO2 96%   BMI 30.92 kg/m²      Physical Exam  Vitals and nursing note reviewed. Constitutional:       General: She is not in acute distress. Comments: OW   Eyes:      General: No scleral icterus. Conjunctiva/sclera: Conjunctivae normal.   Cardiovascular:      Rate and Rhythm: Normal rate and regular rhythm. Pulmonary:      Effort: Pulmonary effort is normal. No respiratory distress. Breath sounds: Normal breath sounds. Abdominal:      General: Bowel sounds are normal.      Tenderness: There is no abdominal tenderness. There is no right CVA tenderness or left CVA tenderness. Genitourinary:     General: Normal vulva. Vagina: No vaginal discharge. Comments: Breast: No SC or Axillary LAD; No discrete palp mass; No nipple d/c    Pelvic:No v/v lesion; No CMT; No palp. mass  Musculoskeletal:         General: Tenderness present. Cervical back: Tenderness (b/l paraspinal and trapezius mm) present. Right lower leg: No edema. Left lower leg: No edema. Skin:     General: Skin is warm and dry. Coloration: Skin is not jaundiced. Findings: No rash. Neurological:      General: No focal deficit present. Mental Status: She is alert and oriented to person, place, and time. Cranial Nerves: No cranial nerve deficit.    Psychiatric:         Mood and Affect: Mood normal.         Vision Screening    Right eye Left eye Both eyes   Without correction      With correction 20/25 20/30 20/20           Lyle Sierra MD  [address] [facility]

## 2023-11-08 ENCOUNTER — APPOINTMENT (OUTPATIENT)
Dept: LAB | Facility: CLINIC | Age: 58
End: 2023-11-08
Payer: COMMERCIAL

## 2023-11-08 DIAGNOSIS — Z13.0 SCREENING FOR DEFICIENCY ANEMIA: ICD-10-CM

## 2023-11-08 DIAGNOSIS — E78.2 MIXED HYPERLIPIDEMIA: ICD-10-CM

## 2023-11-08 DIAGNOSIS — I10 ESSENTIAL HYPERTENSION: ICD-10-CM

## 2023-11-08 DIAGNOSIS — Z00.00 ANNUAL PHYSICAL EXAM: ICD-10-CM

## 2023-11-08 DIAGNOSIS — M25.50 ARTHRALGIA, UNSPECIFIED JOINT: ICD-10-CM

## 2023-11-08 DIAGNOSIS — Z13.1 SCREENING FOR DIABETES MELLITUS: ICD-10-CM

## 2023-11-08 DIAGNOSIS — Z13.29 SCREENING FOR THYROID DISORDER: ICD-10-CM

## 2023-11-08 LAB
ALBUMIN SERPL BCP-MCNC: 4.2 G/DL (ref 3.5–5)
ALP SERPL-CCNC: 57 U/L (ref 34–104)
ALT SERPL W P-5'-P-CCNC: 20 U/L (ref 7–52)
ANION GAP SERPL CALCULATED.3IONS-SCNC: 5 MMOL/L
AST SERPL W P-5'-P-CCNC: 16 U/L (ref 13–39)
BILIRUB SERPL-MCNC: 1.65 MG/DL (ref 0.2–1)
BUN SERPL-MCNC: 14 MG/DL (ref 5–25)
CALCIUM SERPL-MCNC: 9.4 MG/DL (ref 8.4–10.2)
CHLORIDE SERPL-SCNC: 105 MMOL/L (ref 96–108)
CHOLEST SERPL-MCNC: 214 MG/DL
CO2 SERPL-SCNC: 29 MMOL/L (ref 21–32)
CREAT SERPL-MCNC: 0.62 MG/DL (ref 0.6–1.3)
ERYTHROCYTE [DISTWIDTH] IN BLOOD BY AUTOMATED COUNT: 11.9 % (ref 11.6–15.1)
EST. AVERAGE GLUCOSE BLD GHB EST-MCNC: 108 MG/DL
GFR SERPL CREATININE-BSD FRML MDRD: 99 ML/MIN/1.73SQ M
GLUCOSE P FAST SERPL-MCNC: 107 MG/DL (ref 65–99)
HBA1C MFR BLD: 5.4 %
HCT VFR BLD AUTO: 43.6 % (ref 34.8–46.1)
HDLC SERPL-MCNC: 49 MG/DL
HGB BLD-MCNC: 14.8 G/DL (ref 11.5–15.4)
LDLC SERPL CALC-MCNC: 140 MG/DL (ref 0–100)
LIPASE SERPL-CCNC: 24 U/L (ref 11–82)
MAGNESIUM SERPL-MCNC: 2.1 MG/DL (ref 1.9–2.7)
MCH RBC QN AUTO: 30.5 PG (ref 26.8–34.3)
MCHC RBC AUTO-ENTMCNC: 33.9 G/DL (ref 31.4–37.4)
MCV RBC AUTO: 90 FL (ref 82–98)
PLATELET # BLD AUTO: 277 THOUSANDS/UL (ref 149–390)
PMV BLD AUTO: 9.5 FL (ref 8.9–12.7)
POTASSIUM SERPL-SCNC: 4.1 MMOL/L (ref 3.5–5.3)
PROT SERPL-MCNC: 6.8 G/DL (ref 6.4–8.4)
RBC # BLD AUTO: 4.86 MILLION/UL (ref 3.81–5.12)
SODIUM SERPL-SCNC: 139 MMOL/L (ref 135–147)
TRIGL SERPL-MCNC: 126 MG/DL
TSH SERPL DL<=0.05 MIU/L-ACNC: 2.39 UIU/ML (ref 0.45–4.5)
WBC # BLD AUTO: 5.67 THOUSAND/UL (ref 4.31–10.16)

## 2023-11-08 PROCEDURE — 85027 COMPLETE CBC AUTOMATED: CPT

## 2023-11-08 PROCEDURE — 83735 ASSAY OF MAGNESIUM: CPT

## 2023-11-08 PROCEDURE — 80053 COMPREHEN METABOLIC PANEL: CPT

## 2023-11-08 PROCEDURE — 84443 ASSAY THYROID STIM HORMONE: CPT

## 2023-11-08 PROCEDURE — 36415 COLL VENOUS BLD VENIPUNCTURE: CPT

## 2023-11-08 PROCEDURE — 83036 HEMOGLOBIN GLYCOSYLATED A1C: CPT

## 2023-11-08 PROCEDURE — 83690 ASSAY OF LIPASE: CPT

## 2023-11-08 PROCEDURE — 80061 LIPID PANEL: CPT

## 2023-11-13 LAB
CYTOLOGIST CVX/VAG CYTO: NORMAL
DX ICD CODE: NORMAL
OTHER STN SPEC: NORMAL
OTHER STN SPEC: NORMAL
PATH REPORT.FINAL DX SPEC: NORMAL
SL AMB NOTE:: NORMAL
SL AMB SPECIMEN ADEQUACY: NORMAL
SL AMB TEST METHODOLOGY: NORMAL

## 2024-01-05 PROBLEM — Z13.0 SCREENING FOR DEFICIENCY ANEMIA: Status: RESOLVED | Noted: 2021-10-22 | Resolved: 2024-01-05

## 2024-01-05 PROBLEM — Z12.4 CERVICAL CANCER SCREENING: Status: RESOLVED | Noted: 2023-11-06 | Resolved: 2024-01-05

## 2024-01-22 DIAGNOSIS — F41.9 ANXIETY AND DEPRESSION: ICD-10-CM

## 2024-01-22 DIAGNOSIS — I10 ESSENTIAL HYPERTENSION: ICD-10-CM

## 2024-01-22 DIAGNOSIS — F32.A ANXIETY AND DEPRESSION: ICD-10-CM

## 2024-01-22 RX ORDER — METOPROLOL SUCCINATE 25 MG/1
TABLET, EXTENDED RELEASE ORAL
Qty: 30 TABLET | Refills: 2 | Status: SHIPPED | OUTPATIENT
Start: 2024-01-22

## 2024-01-22 RX ORDER — CITALOPRAM 20 MG/1
TABLET ORAL
Qty: 30 TABLET | Refills: 2 | Status: SHIPPED | OUTPATIENT
Start: 2024-01-22

## 2024-02-19 ENCOUNTER — TELEPHONE (OUTPATIENT)
Age: 59
End: 2024-02-19

## 2024-02-19 DIAGNOSIS — Z12.31 SCREENING MAMMOGRAM FOR BREAST CANCER: Primary | ICD-10-CM

## 2024-02-19 NOTE — TELEPHONE ENCOUNTER
Dr. Heath:    Patient has scheduled appointment for mammogram on 2/29.  Please place order in patient's chart and fax to [phone].

## 2024-02-19 NOTE — TELEPHONE ENCOUNTER
Pt called stating she is at [facility] in [place] . She thought her Mammogram order was up to date.  She is asking if someone can fax over a new order for her annual mammogram.  She is at the facility waiting.  Fax: [phone]

## 2024-02-20 DIAGNOSIS — E78.01 FAMILIAL HYPERCHOLESTEROLEMIA: ICD-10-CM

## 2024-02-20 RX ORDER — EZETIMIBE 10 MG/1
10 TABLET ORAL DAILY
Qty: 90 TABLET | Refills: 3 | Status: SHIPPED | OUTPATIENT
Start: 2024-02-20

## 2024-02-20 NOTE — TELEPHONE ENCOUNTER
Regarding: Script/Order  Contact: [phone]  ----- Message from Anahi Bailey sent at 2/20/2024  9:28 AM EST -----       ----- Message from Agustina Matthew to Lani Heath MD sent at 2/20/2024  9:25 AM -----   Please provide an order for a mammogram and colonoscopy for me.  I have a colonoscopy scheduled for 2/22/24 and a mammogram scheduled for 2/29/24.  Thank you,  Agustina Matthew

## 2024-02-29 NOTE — TELEPHONE ENCOUNTER
Patient calling is at [facility] again and mammo script not there, retried to fax through epic to [phone] - patient hung up while on phone with clerical, please try to manually fax her order

## 2024-03-21 DIAGNOSIS — E78.01 FAMILIAL HYPERCHOLESTEROLEMIA: ICD-10-CM

## 2024-03-21 RX ORDER — EZETIMIBE 10 MG/1
10 TABLET ORAL DAILY
Qty: 90 TABLET | Refills: 3 | Status: SHIPPED | OUTPATIENT
Start: 2024-03-21

## 2024-03-26 ENCOUNTER — OFFICE VISIT (OUTPATIENT)
Dept: OBGYN CLINIC | Facility: CLINIC | Age: 59
End: 2024-03-26
Payer: COMMERCIAL

## 2024-03-26 VITALS
HEART RATE: 60 BPM | HEIGHT: 67 IN | WEIGHT: 197 LBS | DIASTOLIC BLOOD PRESSURE: 60 MMHG | SYSTOLIC BLOOD PRESSURE: 126 MMHG | BODY MASS INDEX: 30.92 KG/M2

## 2024-03-26 DIAGNOSIS — M25.562 CHRONIC PAIN OF LEFT KNEE: ICD-10-CM

## 2024-03-26 DIAGNOSIS — M17.12 PRIMARY OSTEOARTHRITIS OF LEFT KNEE: Primary | ICD-10-CM

## 2024-03-26 DIAGNOSIS — G89.29 CHRONIC PAIN OF LEFT KNEE: ICD-10-CM

## 2024-03-26 PROCEDURE — 99213 OFFICE O/P EST LOW 20 MIN: CPT | Performed by: ORTHOPAEDIC SURGERY

## 2024-03-26 PROCEDURE — 20610 DRAIN/INJ JOINT/BURSA W/O US: CPT | Performed by: ORTHOPAEDIC SURGERY

## 2024-03-26 RX ADMIN — BUPIVACAINE HYDROCHLORIDE 4 ML: 5 INJECTION, SOLUTION PERINEURAL at 12:15

## 2024-03-26 RX ADMIN — TRIAMCINOLONE ACETONIDE 40 MG: 40 INJECTION, SUSPENSION INTRA-ARTICULAR; INTRAMUSCULAR at 12:15

## 2024-03-26 NOTE — PROGRESS NOTES
Ortho Sports Medicine Follow Up Patient Visit     Assesment:   59 y.o. female with left primary knee osteoarthritis    Plan:    We had a long discussion reviewing non-operative and surgical interventions to treat her left knee degenerative joint disease. We discussed non-operative treatment options, such as physical therapy, bracing options, voltaren gel, ibuprofen, steroid injections, viscosupplementation injections, and weight management. The patient elected to proceed with a steroid injection and home exercises at this time. We explained that getting multiple steroid injections in close proximity can be detrimental to tissues and become less effective with recurring injections, but may be effective in providing some degree of pain relief. The patient may consider Visco injections in the future depending on relief obtained following her injection today.     Ultimately, the patient's extent of arthritic changes would make them a candidate for a knee replacement. If the patient's symptoms do not respond well to the various non-operative interventions described above and is significantly affecting her quality of life, we can refer her to a joint replacement specialist to discuss surgical options.         History of Present Illness:    The patient is a 59 y.o. female, presenting for follow up evaluation of left primary knee osteoarthritis. The patient is hoping to receive another steroid injection after obtaining about 5 months worth of relief from her prior left knee CSI performed on 7/18/23. The patient did obtain benefit from attending formal PT at that time as well, but unfortunately did have a high co-pay. Today, the patient notes pain localized to the medial aspect gradually returned, such that she is interested in another steroid injection today. She does not have any significant locking but she does notice crepitus.  She does not have any swelling or numbness/tingling.    Knee Surgical History:  None    Past  Medical, Social and Family History:  Past Medical History:   Diagnosis Date    Allergic     Anxiety     HL (hearing loss)     Hyperlipidemia     Hypertension     Lyme disease 07/13/2007    Neuropathy     last assessed: 10/16/14    Osteoarthritis     PONV (postoperative nausea and vomiting)     Seasonal allergies     Sleep apnea     snoring    Vascular disorder     Visual impairment      Past Surgical History:   Procedure Laterality Date    BREAST SURGERY      MYOMECTOMY      SKIN BIOPSY      SCC, BCC    VARICOSE VEIN SURGERY      Ligation     Allergies   Allergen Reactions    Codeine Nausea Only     Reaction Date: 16Feb2004; Annotation - [id number]: dizzy  /jjw     Current Outpatient Medications on File Prior to Visit   Medication Sig Dispense Refill    Bioflavonoid Products (QUERCETIN COMPLEX IMMUNE PO) Take 1,000 mg by mouth      Calcium Carb-Cholecalciferol 600-500 MG-UNIT CAPS Take by mouth      Cholecalciferol (VITAMIN D3) 1000 units CAPS Take by mouth      citalopram (CeleXA) 20 mg tablet TAKE ONE TABLET BY MOUTH EVERY DAY 30 tablet 2    Docosahexaenoic Acid (DHA) 200 MG CAPS Take by mouth      ezetimibe (ZETIA) 10 mg tablet TAKE ONE TABLET BY MOUTH EVERY DAY 90 tablet 3    metoprolol succinate (TOPROL-XL) 25 mg 24 hr tablet TAKE ONE TABLET BY MOUTH EVERY DAY 30 tablet 2    Omega-3 Fatty Acids (FISH OIL) 1,000 mg Take by mouth      Probiotic Product (PROBIOTIC-10) CAPS Take by mouth      zinc gluconate 50 mg tablet Take 50 mg by mouth daily       No current facility-administered medications on file prior to visit.     Social History     Socioeconomic History    Marital status: /Civil Union     Spouse name: Not on file    Number of children: Not on file    Years of education: Not on file    Highest education level: Not on file   Occupational History    Not on file   Tobacco Use    Smoking status: Former     Current packs/day: 0.00     Average packs/day: 0.3 packs/day for 33.4 years (8.3 ttl pk-yrs)      "Types: Cigarettes     Start date: 1976     Quit date: 2010     Years since quittin.2    Smokeless tobacco: Former    Tobacco comments:     off and onsmoker   Vaping Use    Vaping status: Every Day    Substances: THC   Substance and Sexual Activity    Alcohol use: Yes     Alcohol/week: 5.0 standard drinks of alcohol     Types: 5 Glasses of wine per week     Comment: occasional, social    Drug use: Yes     Frequency: 5.0 times per week     Types: Marijuana     Comment: vape THC oil    Sexual activity: Yes     Partners: Male     Birth control/protection: Male Sterilization   Other Topics Concern    Not on file   Social History Narrative    Not on file     Social Determinants of Health     Financial Resource Strain: Not on file   Food Insecurity: Not on file   Transportation Needs: Not on file   Physical Activity: Not on file   Stress: Not on file   Social Connections: Not on file   Intimate Partner Violence: Not on file   Housing Stability: Not on file         I have reviewed the past medical, surgical, social and family history, medications and allergies as documented in the EMR.    Review of systems: ROS is negative other than that noted in the HPI.  Constitutional: Negative for fatigue and fever.   HENT: Negative for sore throat.    Respiratory: Negative for shortness of breath.    Cardiovascular: Negative for chest pain.   Gastrointestinal: Negative for abdominal pain.   Endocrine: Negative for cold intolerance and heat intolerance.   Genitourinary: Negative for flank pain.   Musculoskeletal: Negative for back pain.   Skin: Negative for rash.   Allergic/Immunologic: Negative for immunocompromised state.   Neurological: Negative for dizziness.   Psychiatric/Behavioral: Negative for agitation.      Physical Exam:    Blood pressure 126/60, pulse 60, height 5' 7\" (1.702 m), weight 89.4 kg (197 lb).    General/Constitutional: NAD, well developed, well nourished  HENT: Normocephalic, atraumatic  CV: Intact " distal pulses, regular rate  Resp: No respiratory distress or labored breathing  Lymphatic: No lymphadenopathy palpated  Neuro: Alert and Oriented x 3, no focal deficits  Psych: Normal mood, normal affect  Skin: Warm, dry, no rashes, no erythema      Knee Exam:   No significant skin lesions or deformity.  Range of motion from 0 to 130. Limited patellar mobility.  Mild medial joint line tenderness. No lateral joint line tenderness. Knee is stable to varus stress, Lachman, and posterior drawer. There is valgus gapping with a firm endpoint on exam. Neurovascularly intact distally      Knee Imaging    X-rays of the knees reviewed from 7/18/23. These show no acute fractures. Medial compartment degenerative changes bilaterally, including joint space narrowing, osteophytosis, and subchondral sclerosis. Patella is well centered on the trochlea.    Large joint arthrocentesis: L knee  Universal Protocol:  Consent: Verbal consent obtained.  Risks and benefits: risks, benefits and alternatives were discussed  Consent given by: patient  Timeout called at: 3/26/2024 12:25 PM.  Patient understanding: patient states understanding of the procedure being performed  Patient consent: the patient's understanding of the procedure matches consent given  Site marked: the operative site was marked  Radiology Images displayed and confirmed. If images not available, report reviewed: imaging studies available  Patient identity confirmed: verbally with patient  Supporting Documentation  Indications: pain   Procedure Details  Location: knee - L knee  Needle size: 22 G  Ultrasound guidance: no  Approach: anterolateral  Medications administered: 40 mg triamcinolone acetonide 40 mg/mL; 4 mL bupivacaine 0.5 %    Patient tolerance: patient tolerated the procedure well with no immediate complications  Dressing:  Sterile dressing applied

## 2024-03-28 RX ORDER — BUPIVACAINE HYDROCHLORIDE 5 MG/ML
4 INJECTION, SOLUTION PERINEURAL
Status: COMPLETED | OUTPATIENT
Start: 2024-03-26 | End: 2024-03-26

## 2024-03-28 RX ORDER — TRIAMCINOLONE ACETONIDE 40 MG/ML
40 INJECTION, SUSPENSION INTRA-ARTICULAR; INTRAMUSCULAR
Status: COMPLETED | OUTPATIENT
Start: 2024-03-26 | End: 2024-03-26

## 2024-04-20 DIAGNOSIS — F32.A ANXIETY AND DEPRESSION: ICD-10-CM

## 2024-04-20 DIAGNOSIS — F41.9 ANXIETY AND DEPRESSION: ICD-10-CM

## 2024-04-20 DIAGNOSIS — I10 ESSENTIAL HYPERTENSION: ICD-10-CM

## 2024-04-20 RX ORDER — CITALOPRAM 20 MG/1
TABLET ORAL
Qty: 30 TABLET | Refills: 2 | Status: SHIPPED | OUTPATIENT
Start: 2024-04-20

## 2024-04-20 RX ORDER — METOPROLOL SUCCINATE 25 MG/1
TABLET, EXTENDED RELEASE ORAL
Qty: 30 TABLET | Refills: 2 | Status: SHIPPED | OUTPATIENT
Start: 2024-04-20

## 2024-05-08 ENCOUNTER — OFFICE VISIT (OUTPATIENT)
Dept: FAMILY MEDICINE CLINIC | Facility: CLINIC | Age: 59
End: 2024-05-08
Payer: COMMERCIAL

## 2024-05-08 VITALS
HEART RATE: 62 BPM | OXYGEN SATURATION: 97 % | DIASTOLIC BLOOD PRESSURE: 62 MMHG | BODY MASS INDEX: 30.45 KG/M2 | WEIGHT: 194 LBS | SYSTOLIC BLOOD PRESSURE: 110 MMHG | TEMPERATURE: 98 F | HEIGHT: 67 IN | RESPIRATION RATE: 14 BRPM

## 2024-05-08 DIAGNOSIS — F32.A ANXIETY AND DEPRESSION: Primary | ICD-10-CM

## 2024-05-08 DIAGNOSIS — F41.9 ANXIETY AND DEPRESSION: Primary | ICD-10-CM

## 2024-05-08 PROCEDURE — 99213 OFFICE O/P EST LOW 20 MIN: CPT | Performed by: FAMILY MEDICINE

## 2024-05-08 RX ORDER — ALPRAZOLAM 0.25 MG/1
0.25 TABLET ORAL 2 TIMES DAILY PRN
Qty: 30 TABLET | Refills: 0 | Status: SHIPPED | OUTPATIENT
Start: 2024-05-08

## 2024-05-15 NOTE — PROGRESS NOTES
Assessment/Plan:    1. Anxiety and depression  -     ALPRAZolam (XANAX) 0.25 mg tablet; Take 1 tablet (0.25 mg total) by mouth 2 (two) times a day as needed for anxiety    Cont. Celexa.  Readdressed relaxation techniques--eg regular exercise; adequate sleep/hydration.  T/c addtl alternative therapies--eg accupuncture, massage, biofeedback, counseling.      Subjective:      Patient ID: Agustina Matthew is a 59 y.o. female.    Chief Complaint   Patient presents with   • Medication Problem     citalopram         HPI    Follow-up  Interval hx reviewed  Ongoing stress-mainly job related due to decreased staff, increased workload  +fatigue, diff w sleep  Req med refill  BP in range      The following portions of the patient's history were reviewed and updated as appropriate: allergies, current medications, past family history, past medical history, past social history, past surgical history and problem list.    Review of Systems    Per hpi    Current Outpatient Medications   Medication Sig Dispense Refill   • ALPRAZolam (XANAX) 0.25 mg tablet Take 1 tablet (0.25 mg total) by mouth 2 (two) times a day as needed for anxiety 30 tablet 0   • Calcium Carb-Cholecalciferol 600-500 MG-UNIT CAPS Take by mouth     • Cholecalciferol (VITAMIN D3) 1000 units CAPS Take by mouth     • citalopram (CeleXA) 20 mg tablet TAKE ONE TABLET BY MOUTH EVERY DAY 30 tablet 2   • Docosahexaenoic Acid (DHA) 200 MG CAPS Take by mouth     • ezetimibe (ZETIA) 10 mg tablet TAKE ONE TABLET BY MOUTH EVERY DAY 90 tablet 3   • metoprolol succinate (TOPROL-XL) 25 mg 24 hr tablet TAKE ONE TABLET BY MOUTH EVERY DAY 30 tablet 2   • Omega-3 Fatty Acids (FISH OIL) 1,000 mg Take by mouth     • Probiotic Product (PROBIOTIC-10) CAPS Take by mouth     • zinc gluconate 50 mg tablet Take 50 mg by mouth daily       No current facility-administered medications for this visit.       Objective:    /62 (BP Location: Left arm, Patient Position: Sitting, Cuff Size:  "Adult)   Pulse 62   Temp 98 °F (36.7 °C) (Temporal)   Resp 14   Ht 5' 7\" (1.702 m)   Wt 88 kg (194 lb)   SpO2 97%   BMI 30.38 kg/m²        Physical Exam  Vitals and nursing note reviewed.   Constitutional:       General: She is not in acute distress.     Appearance: Normal appearance.   Cardiovascular:      Rate and Rhythm: Normal rate and regular rhythm.   Pulmonary:      Effort: Pulmonary effort is normal. No respiratory distress.   Musculoskeletal:      Cervical back: Neck supple. No tenderness.   Neurological:      General: No focal deficit present.      Mental Status: She is alert and oriented to person, place, and time.      Cranial Nerves: No cranial nerve deficit.   Psychiatric:         Mood and Affect: Mood normal.              Lani Heath MD  "

## 2024-07-17 DIAGNOSIS — F41.9 ANXIETY AND DEPRESSION: ICD-10-CM

## 2024-07-17 DIAGNOSIS — I10 ESSENTIAL HYPERTENSION: ICD-10-CM

## 2024-07-17 DIAGNOSIS — F32.A ANXIETY AND DEPRESSION: ICD-10-CM

## 2024-07-17 RX ORDER — CITALOPRAM 20 MG/1
TABLET ORAL
Qty: 30 TABLET | Refills: 5 | Status: SHIPPED | OUTPATIENT
Start: 2024-07-17

## 2024-07-17 RX ORDER — METOPROLOL SUCCINATE 25 MG/1
TABLET, EXTENDED RELEASE ORAL
Qty: 30 TABLET | Refills: 5 | Status: SHIPPED | OUTPATIENT
Start: 2024-07-17

## 2024-11-08 ENCOUNTER — OFFICE VISIT (OUTPATIENT)
Dept: FAMILY MEDICINE CLINIC | Facility: CLINIC | Age: 59
End: 2024-11-08
Payer: COMMERCIAL

## 2024-11-08 VITALS
DIASTOLIC BLOOD PRESSURE: 80 MMHG | RESPIRATION RATE: 16 BRPM | TEMPERATURE: 97.7 F | OXYGEN SATURATION: 97 % | SYSTOLIC BLOOD PRESSURE: 120 MMHG | HEIGHT: 67 IN | WEIGHT: 198 LBS | BODY MASS INDEX: 31.08 KG/M2

## 2024-11-08 DIAGNOSIS — Z00.00 PHYSICAL EXAM: Primary | ICD-10-CM

## 2024-11-08 DIAGNOSIS — Z13.1 SCREENING FOR DIABETES MELLITUS: ICD-10-CM

## 2024-11-08 DIAGNOSIS — R73.09 ABNORMAL BLOOD SUGAR: ICD-10-CM

## 2024-11-08 DIAGNOSIS — Z23 ENCOUNTER FOR IMMUNIZATION: ICD-10-CM

## 2024-11-08 DIAGNOSIS — Z13.0 SCREENING FOR DEFICIENCY ANEMIA: ICD-10-CM

## 2024-11-08 DIAGNOSIS — E07.89 THYROID FULLNESS: ICD-10-CM

## 2024-11-08 DIAGNOSIS — E78.2 MIXED HYPERLIPIDEMIA: ICD-10-CM

## 2024-11-08 DIAGNOSIS — Z12.31 ENCOUNTER FOR SCREENING MAMMOGRAM FOR BREAST CANCER: ICD-10-CM

## 2024-11-08 DIAGNOSIS — I10 ESSENTIAL HYPERTENSION: ICD-10-CM

## 2024-11-08 DIAGNOSIS — Z13.29 SCREENING FOR THYROID DISORDER: ICD-10-CM

## 2024-11-08 PROCEDURE — 96372 THER/PROPH/DIAG INJ SC/IM: CPT | Performed by: FAMILY MEDICINE

## 2024-11-08 PROCEDURE — 90471 IMMUNIZATION ADMIN: CPT | Performed by: FAMILY MEDICINE

## 2024-11-08 PROCEDURE — 99396 PREV VISIT EST AGE 40-64: CPT | Performed by: FAMILY MEDICINE

## 2024-11-08 PROCEDURE — 90673 RIV3 VACCINE NO PRESERV IM: CPT | Performed by: FAMILY MEDICINE

## 2024-11-08 NOTE — PROGRESS NOTES
Ambulatory Visit  Name: Agustina Matthew      : 1965      MRN: [id number]  Encounter Provider: Lani Heath MD  Encounter Date: 2024   Encounter department: [facility]    Assessment & Plan  Physical exam    Orders:    CBC; Future    Comprehensive metabolic panel; Future    Hemoglobin A1C; Future    Lipid Panel with Direct LDL reflex; Future    Lipase; Future    Magnesium; Future    TSH, 3rd generation; Future    CBC    Comprehensive metabolic panel    Lipid Panel with Direct LDL reflex    Lipase    Magnesium    TSH, 3rd generation    Encounter for screening mammogram for breast cancer    Orders:    Mammo screening bilateral w 3d and cad; Future    Encounter for immunization    Orders:    influenza vaccine, recombinant, PF, 0.5 mL IM (Flublok)    Abnormal blood sugar    Orders:    Comprehensive metabolic panel; Future    Comprehensive metabolic panel    Essential hypertension    Orders:    Comprehensive metabolic panel; Future    Magnesium; Future    Comprehensive metabolic panel    Magnesium    Mixed hyperlipidemia    Orders:    Lipid Panel with Direct LDL reflex; Future    Lipase; Future    Lipid Panel with Direct LDL reflex    Lipase    Screening for diabetes mellitus    Orders:    Comprehensive metabolic panel; Future    Hemoglobin A1C; Future    Comprehensive metabolic panel    Screening for thyroid disorder    Orders:    TSH, 3rd generation; Future    TSH, 3rd generation    Screening for deficiency anemia    Orders:    CBC; Future    CBC    Thyroid fullness    Orders:    Thyroid Antibodies Panel; Future    BMI 31.0-31.9,adult           Depression Screening and Follow-up Plan: Patient was screened for depression during today's encounter. They screened negative with a PHQ-9 score of 0.    History of Present Illness   {?Quick Links Encounters * My Last Note * Last Note in Specialty * Snapshot * Since Last Visit * History :69686}  HPI    {History obtained from  (Optional):97989}  Review of Systems   Constitutional:  Positive for fatigue. Negative for fever.   HENT:  Negative for sinus pressure.    Eyes:  Negative for photophobia and pain.   Respiratory: Negative.     Cardiovascular: Negative.    Gastrointestinal:  Negative for nausea and vomiting.        GERD   Musculoskeletal:  Positive for arthralgias and neck pain.   Allergic/Immunologic: Positive for environmental allergies.   Neurological:  Negative for dizziness.   Psychiatric/Behavioral:  Positive for sleep disturbance. Negative for hallucinations and self-injury.      Past Medical History   Past Medical History:   Diagnosis Date    Allergic     Anxiety     HL (hearing loss)     Hyperlipidemia     Hypertension     Lyme disease 07/13/2007    Neuropathy     last assessed: 10/16/14    Osteoarthritis     PONV (postoperative nausea and vomiting)     Seasonal allergies     Sleep apnea     snoring    Vascular disorder     Visual impairment      Past Surgical History:   Procedure Laterality Date    BREAST SURGERY      MYOMECTOMY      SKIN BIOPSY      SCC, BCC    VARICOSE VEIN SURGERY      Ligation     Family History   Problem Relation Age of Onset    Heart disease Mother     Osteoporosis Mother     Vascular Disease Mother         vericose veins    Heart disease Father     Hypertension Father     ADD / ADHD Father     Breast cancer Sister     Atrial fibrillation Brother     Alzheimer's disease Maternal Grandmother         dementia    Breast cancer Maternal Grandmother     Colon cancer Maternal Grandfather     Alzheimer's disease Maternal Uncle         dementia    Heart disease Other     Breast cancer Other      Current Outpatient Medications on File Prior to Visit   Medication Sig Dispense Refill    Calcium Carb-Cholecalciferol 600-500 MG-UNIT CAPS Take by mouth      Cholecalciferol (VITAMIN D3) 1000 units CAPS Take by mouth      citalopram (CeleXA) 20 mg tablet TAKE ONE TABLET BY MOUTH EVERY DAY 30 tablet 5     Docosahexaenoic Acid (DHA) 200 MG CAPS Take by mouth      ezetimibe (ZETIA) 10 mg tablet TAKE ONE TABLET BY MOUTH EVERY DAY 90 tablet 3    metoprolol succinate (TOPROL-XL) 25 mg 24 hr tablet TAKE ONE TABLET BY MOUTH EVERY DAY 30 tablet 5    Omega-3 Fatty Acids (FISH OIL) 1,000 mg Take by mouth      Probiotic Product (PROBIOTIC-10) CAPS Take by mouth      zinc gluconate 50 mg tablet Take 50 mg by mouth daily      ALPRAZolam (XANAX) 0.25 mg tablet Take 1 tablet (0.25 mg total) by mouth 2 (two) times a day as needed for anxiety (Patient not taking: Reported on 11/8/2024) 30 tablet 0     No current facility-administered medications on file prior to visit.     Allergies   Allergen Reactions    Codeine Nausea Only     Reaction Date: 16Feb2004; Annotation - 04Sep2012: dizzy  /jjw      Current Outpatient Medications on File Prior to Visit   Medication Sig Dispense Refill    Calcium Carb-Cholecalciferol 600-500 MG-UNIT CAPS Take by mouth      Cholecalciferol (VITAMIN D3) 1000 units CAPS Take by mouth      citalopram (CeleXA) 20 mg tablet TAKE ONE TABLET BY MOUTH EVERY DAY 30 tablet 5    Docosahexaenoic Acid (DHA) 200 MG CAPS Take by mouth      ezetimibe (ZETIA) 10 mg tablet TAKE ONE TABLET BY MOUTH EVERY DAY 90 tablet 3    metoprolol succinate (TOPROL-XL) 25 mg 24 hr tablet TAKE ONE TABLET BY MOUTH EVERY DAY 30 tablet 5    Omega-3 Fatty Acids (FISH OIL) 1,000 mg Take by mouth      Probiotic Product (PROBIOTIC-10) CAPS Take by mouth      zinc gluconate 50 mg tablet Take 50 mg by mouth daily      ALPRAZolam (XANAX) 0.25 mg tablet Take 1 tablet (0.25 mg total) by mouth 2 (two) times a day as needed for anxiety (Patient not taking: Reported on 11/8/2024) 30 tablet 0     No current facility-administered medications on file prior to visit.      Social History     Tobacco Use    Smoking status: Former     Current packs/day: 0.00     Average packs/day: 0.3 packs/day for 33.4 years (8.3 ttl pk-yrs)     Types: Cigarettes     Start  "date: 1976     Quit date:      Years since quittin.8    Smokeless tobacco: Former    Tobacco comments:     off and onsmoker   Vaping Use    Vaping status: Every Day    Substances: THC   Substance and Sexual Activity    Alcohol use: Yes     Alcohol/week: 5.0 standard drinks of alcohol     Types: 5 Glasses of wine per week     Comment: occasional, social    Drug use: Yes     Frequency: 5.0 times per week     Types: Marijuana     Comment: vape THC oil    Sexual activity: Yes     Partners: Male     Birth control/protection: Male Sterilization         Objective   {?Quick Links Trend Vitals * Enter New Vitals * Results Review * Timeline (Adult) * Labs * Imaging * Cardiology * Procedures * Lung Cancer Screening * Surgical eConsent :97171}  /80 (BP Location: Left arm, Patient Position: Sitting, Cuff Size: Large)   Temp 97.7 °F (36.5 °C)   Resp 16   Ht 5' 7\" (1.702 m)   Wt 89.8 kg (198 lb)   SpO2 97%   BMI 31.01 kg/m²     Physical Exam  Vitals and nursing note reviewed.   Constitutional:       General: She is not in acute distress.     Appearance: Normal appearance.      Comments: OW   HENT:      Head: Normocephalic and atraumatic.      Mouth/Throat:      Pharynx: No oropharyngeal exudate.   Eyes:      Conjunctiva/sclera: Conjunctivae normal.   Cardiovascular:      Rate and Rhythm: Normal rate and regular rhythm.      Heart sounds: Normal heart sounds.   Pulmonary:      Effort: Pulmonary effort is normal. No respiratory distress.      Breath sounds: Normal breath sounds.   Abdominal:      General: Bowel sounds are normal.      Palpations: Abdomen is soft.      Tenderness: There is no abdominal tenderness. There is no right CVA tenderness or left CVA tenderness.   Musculoskeletal:         General: Normal range of motion.      Cervical back: Neck supple. No tenderness.      Right lower leg: No edema.      Left lower leg: No edema.   Skin:     General: Skin is warm and dry.      Coloration: Skin is not " jaundiced.      Findings: No rash.   Neurological:      General: No focal deficit present.      Mental Status: She is alert and oriented to person, place, and time.      Cranial Nerves: No cranial nerve deficit.   Psychiatric:         Mood and Affect: Mood normal.     {Administrative / Billing Section (Optional):63674}

## 2024-11-08 NOTE — PATIENT INSTRUCTIONS
Medicare Preventive Visit Patient Instructions  Thank you for completing your Welcome to Medicare Visit or Medicare Annual Wellness Visit today. Your next wellness visit will be due in one year (11/9/2025).  The screening/preventive services that you may require over the next 5-10 years are detailed below. Some tests may not apply to you based off risk factors and/or age. Screening tests ordered at today's visit but not completed yet may show as past due. Also, please note that scanned in results may not display below.  Preventive Screenings:  Service Recommendations Previous Testing/Comments   Colorectal Cancer Screening  * Colonoscopy    * Fecal Occult Blood Test (FOBT)/Fecal Immunochemical Test (FIT)  * Fecal DNA/Cologuard Test  * Flexible Sigmoidoscopy Age: 45-75 years old   Colonoscopy: every 10 years (may be performed more frequently if at higher risk)  OR  FOBT/FIT: every 1 year  OR  Cologuard: every 3 years  OR  Sigmoidoscopy: every 5 years  Screening may be recommended earlier than age 45 if at higher risk for colorectal cancer. Also, an individualized decision between you and your healthcare provider will decide whether screening between the ages of 76-85 would be appropriate. Colonoscopy: 02/20/2019  FOBT/FIT: Not on file  Cologuard: Not on file  Sigmoidoscopy: Not on file    Screening Current     Breast Cancer Screening Age: 40+ years old  Frequency: every 1-2 years  Not required if history of left and right mastectomy Mammogram: 02/29/2024    Screening Current   Cervical Cancer Screening Between the ages of 21-29, pap smear recommended once every 3 years.   Between the ages of 30-65, can perform pap smear with HPV co-testing every 5 years.   Recommendations may differ for women with a history of total hysterectomy, cervical cancer, or abnormal pap smears in past. Pap Smear: 11/07/2023    Screening Current   Hepatitis C Screening Once for adults born between 1945 and 1965  More frequently in patients at  high risk for Hepatitis C Hep C Antibody: 10/11/2017    Screening Current   Diabetes Screening 1-2 times per year if you're at risk for diabetes or have pre-diabetes Fasting glucose: 107 mg/dL (11/8/2023)  A1C: 5.4 % (11/8/2023)  Screening Current   Cholesterol Screening Once every 5 years if you don't have a lipid disorder. May order more often based on risk factors. Lipid panel: 11/08/2023    Screening Not Indicated  History Lipid Disorder     Other Preventive Screenings Covered by Medicare:  Abdominal Aortic Aneurysm (AAA) Screening: covered once if your at risk. You're considered to be at risk if you have a family history of AAA.  Lung Cancer Screening: covers low dose CT scan once per year if you meet all of the following conditions: (1) Age 55-77; (2) No signs or symptoms of lung cancer; (3) Current smoker or have quit smoking within the last 15 years; (4) You have a tobacco smoking history of at least 20 pack years (packs per day multiplied by number of years you smoked); (5) You get a written order from a healthcare provider.  Glaucoma Screening: covered annually if you're considered high risk: (1) You have diabetes OR (2) Family history of glaucoma OR (3)  aged 50 and older OR (4)  American aged 65 and older  Osteoporosis Screening: covered every 2 years if you meet one of the following conditions: (1) You're estrogen deficient and at risk for osteoporosis based off medical history and other findings; (2) Have a vertebral abnormality; (3) On glucocorticoid therapy for more than 3 months; (4) Have primary hyperparathyroidism; (5) On osteoporosis medications and need to assess response to drug therapy.   Last bone density test (DXA Scan): 11/02/2017.  HIV Screening: covered annually if you're between the age of 15-65. Also covered annually if you are younger than 15 and older than 65 with risk factors for HIV infection. For pregnant patients, it is covered up to 3 times per  pregnancy.    Immunizations:  Immunization Recommendations   Influenza Vaccine Annual influenza vaccination during flu season is recommended for all persons aged >= 6 months who do not have contraindications   Pneumococcal Vaccine   * Pneumococcal conjugate vaccine = PCV13 (Prevnar 13), PCV15 (Vaxneuvance), PCV20 (Prevnar 20)  * Pneumococcal polysaccharide vaccine = PPSV23 (Pneumovax) Adults 19-65 yo with certain risk factors or if 65+ yo  If never received any pneumonia vaccine: recommend Prevnar 20 (PCV20)  Give PCV20 if previously received 1 dose of PCV13 or PPSV23   Hepatitis B Vaccine 3 dose series if at intermediate or high risk (ex: diabetes, end stage renal disease, liver disease)   Respiratory syncytial virus (RSV) Vaccine - COVERED BY MEDICARE PART D  * RSVPreF3 (Arexvy) CDC recommends that adults 60 years of age and older may receive a single dose of RSV vaccine using shared clinical decision-making (SCDM)   Tetanus (Td) Vaccine - COST NOT COVERED BY MEDICARE PART B Following completion of primary series, a booster dose should be given every 10 years to maintain immunity against tetanus. Td may also be given as tetanus wound prophylaxis.   Tdap Vaccine - COST NOT COVERED BY MEDICARE PART B Recommended at least once for all adults. For pregnant patients, recommended with each pregnancy.   Shingles Vaccine (Shingrix) - COST NOT COVERED BY MEDICARE PART B  2 shot series recommended in those 19 years and older who have or will have weakened immune systems or those 50 years and older     Health Maintenance Due:      Topic Date Due   • HIV Screening  Never done   • Colorectal Cancer Screening  02/20/2024   • Breast Cancer Screening: Mammogram  02/28/2025   • Cervical Cancer Screening  11/07/2028   • Hepatitis C Screening  Completed     Immunizations Due:      Topic Date Due   • Pneumococcal Vaccine: Pediatrics (0 to 5 Years) and At-Risk Patients (6 to 64 Years) (1 of 2 - PCV) Never done   • DTaP,Tdap,and Td  Vaccines (2 - Td or Tdap) 07/25/2019   • Influenza Vaccine (1) 09/01/2024   • COVID-19 Vaccine (6 - 2023-24 season) 09/01/2024     Advance Directives   What are advance directives?  Advance directives are legal documents that state your wishes and plans for medical care. These plans are made ahead of time in case you lose your ability to make decisions for yourself. Advance directives can apply to any medical decision, such as the treatments you want, and if you want to donate organs.   What are the types of advance directives?  There are many types of advance directives, and each state has rules about how to use them. You may choose a combination of any of the following:  Living will:  This is a written record of the treatment you want. You can also choose which treatments you do not want, which to limit, and which to stop at a certain time. This includes surgery, medicine, IV fluid, and tube feedings.   Durable power of  for healthcare (DPAHC):  This is a written record that states who you want to make healthcare choices for you when you are unable to make them for yourself. This person, called a proxy, is usually a family member or a friend. You may choose more than 1 proxy.  Do not resuscitate (DNR) order:  A DNR order is used in case your heart stops beating or you stop breathing. It is a request not to have certain forms of treatment, such as CPR. A DNR order may be included in other types of advance directives.  Medical directive:  This covers the care that you want if you are in a coma, near death, or unable to make decisions for yourself. You can list the treatments you want for each condition. Treatment may include pain medicine, surgery, blood transfusions, dialysis, IV or tube feedings, and a ventilator (breathing machine).  Values history:  This document has questions about your views, beliefs, and how you feel and think about life. This information can help others choose the care that you would  choose.  Why are advance directives important?  An advance directive helps you control your care. Although spoken wishes may be used, it is better to have your wishes written down. Spoken wishes can be misunderstood, or not followed. Treatments may be given even if you do not want them. An advance directive may make it easier for your family to make difficult choices about your care.   Weight Management   Why it is important to manage your weight:  Being overweight increases your risk of health conditions such as heart disease, high blood pressure, type 2 diabetes, and certain types of cancer. It can also increase your risk for osteoarthritis, sleep apnea, and other respiratory problems. Aim for a slow, steady weight loss. Even a small amount of weight loss can lower your risk of health problems.  How to lose weight safely:  A safe and healthy way to lose weight is to eat fewer calories and get regular exercise. You can lose up about 1 pound a week by decreasing the number of calories you eat by 500 calories each day.   Healthy meal plan for weight management:  A healthy meal plan includes a variety of foods, contains fewer calories, and helps you stay healthy. A healthy meal plan includes the following:  Eat whole-grain foods more often.  A healthy meal plan should contain fiber. Fiber is the part of grains, fruits, and vegetables that is not broken down by your body. Whole-grain foods are healthy and provide extra fiber in your diet. Some examples of whole-grain foods are whole-wheat breads and pastas, oatmeal, brown rice, and bulgur.  Eat a variety of vegetables every day.  Include dark, leafy greens such as spinach, kale, costa greens, and mustard greens. Eat yellow and orange vegetables such as carrots, sweet potatoes, and winter squash.   Eat a variety of fruits every day.  Choose fresh or canned fruit (canned in its own juice or light syrup) instead of juice. Fruit juice has very little or no fiber.  Eat  low-fat dairy foods.  Drink fat-free (skim) milk or 1% milk. Eat fat-free yogurt and low-fat cottage cheese. Try low-fat cheeses such as mozzarella and other reduced-fat cheeses.  Choose meat and other protein foods that are low in fat.  Choose beans or other legumes such as split peas or lentils. Choose fish, skinless poultry (chicken or turkey), or lean cuts of red meat (beef or pork). Before you cook meat or poultry, cut off any visible fat.   Use less fat and oil.  Try baking foods instead of frying them. Add less fat, such as margarine, sour cream, regular salad dressing and mayonnaise to foods. Eat fewer high-fat foods. Some examples of high-fat foods include french fries, doughnuts, ice cream, and cakes.  Eat fewer sweets.  Limit foods and drinks that are high in sugar. This includes candy, cookies, regular soda, and sweetened drinks.  Exercise:  Exercise at least 30 minutes per day on most days of the week. Some examples of exercise include walking, biking, dancing, and swimming. You can also fit in more physical activity by taking the stairs instead of the elevator or parking farther away from stores. Ask your healthcare provider about the best exercise plan for you.      © Copyright [employer] 2018 Information is for End User's use only and may not be sold, redistributed or otherwise used for commercial purposes. All illustrations and images included in CareNotes® are the copyrighted property of [employer] or [employer]

## 2024-11-08 NOTE — PROGRESS NOTES
Ambulatory Visit  Name: Agustina Matthew      : 1965      MRN: [id number]  Encounter Provider: Lani Heath MD  Encounter Date: 2024   Encounter department: [facility]    Assessment & Plan       Preventive health issues were discussed with patient, and age appropriate screening tests were ordered as noted in patient's After Visit Summary. Personalized health advice and appropriate referrals for health education or preventive services given if needed, as noted in patient's After Visit Summary.    History of Present Illness   {?Quick Links Encounters * My Last Note * Last Note in Specialty * Snapshot * Since Last Visit * History :60703}  HPI   Patient Care Team:  Lani Heath MD as PCP - General    Review of Systems  Medical History Reviewed by provider this encounter:       Annual Wellness Visit Questionnaire   Agustina is here for her Subsequent Wellness visit.     Health Risk Assessment:   Patient rates overall health as good. Patient feels that their physical health rating is same. Patient is satisfied with their life. Eyesight was rated as same. Hearing was rated as same. Patient feels that their emotional and mental health rating is same. Patients states they are never, rarely angry. Patient states they are never, rarely unusually tired/fatigued. Pain experienced in the last 7 days has been none. Patient states that she has experienced no weight loss or gain in last 6 months.     Depression Screening:   PHQ-9 Score: 0      Fall Risk Screening:   In the past year, patient has experienced: no history of falling in past year      Urinary Incontinence Screening:   Patient has not leaked urine accidently in the last six months.     Home Safety:  Patient has trouble with stairs inside or outside of their home. Patient has working smoke alarms and has working carbon monoxide detector. Home safety hazards include: none.     Nutrition:   Current diet is Regular.      Medications:   Patient is currently taking over-the-counter supplements. OTC medications include: see medication list. Patient is able to manage medications.     Activities of Daily Living (ADLs)/Instrumental Activities of Daily Living (IADLs):   Walk and transfer into and out of bed and chair?: Yes  Dress and groom yourself?: Yes    Bathe or shower yourself?: Yes    Feed yourself? Yes  Do your laundry/housekeeping?: Yes  Manage your money, pay your bills and track your expenses?: Yes  Make your own meals?: Yes    Do your own shopping?: Yes    Previous Hospitalizations:   Any hospitalizations or ED visits within the last 12 months?: No      Advance Care Planning:   Living will: Yes    Durable POA for healthcare: Yes    Advanced directive: Yes      PREVENTIVE SCREENINGS      Cardiovascular Screening:    General: Screening Not Indicated and History Lipid Disorder      Diabetes Screening:     General: Screening Current      Colorectal Cancer Screening:     General: Screening Current      Breast Cancer Screening:     General: Screening Current      Cervical Cancer Screening:    General: Screening Current      Lung Cancer Screening:     General: Screening Not Indicated      Hepatitis C Screening:    General: Screening Current    Screening, Brief Intervention, and Referral to Treatment (SBIRT)    Screening  Typical number of drinks in a day: 0  Typical number of drinks in a week: 4  Interpretation: Low risk drinking behavior.    AUDIT-C Screenin) How often did you have a drink containing alcohol in the past year? monthly or less  2) How many drinks did you have on a typical day when you were drinking in the past year? 1 to 2  3) How often did you have 6 or more drinks on one occasion in the past year? never    AUDIT-C Score: 1  Interpretation: Score 0-2 (female): Negative screen for alcohol misuse    Single Item Drug Screening:  How often have you used an illegal drug (including marijuana) or a prescription  "medication for non-medical reasons in the past year? daily or almost daily    Single Item Drug Screen Score: 4  Interpretation: POSITIVE screen for possible drug use disorder    Drug Abuse Screening Test (DAST-10):  1) Have you used drugs other than those required for medical reasons? Yes  2) Do you abuse more than one drug at a time? No  3) Are you always able to stop using drugs when you want to? No  4) Have you had \"blackouts\" or \"flashbacks\" as a result of drug use? No  5) Do you ever feel bad or guilty about your drug use? No  6) Does your spouse (or parents) ever complain about your involvement with drugs? No  7) Have you neglected your family because of your use of drugs? No  8) Have you engaged in illegal activities in order to obtain drugs? No  9) Have you ever experienced withdrawal symptoms (felt sick) when you stopped taking drugs? No  10) Have you had medical problems as a result of your drug use (e.g., memory loss, hepatitis, convulsions, bleeding, etc.)? No    DAST-10 Score: 2  Interpretation: Low level problems related to drug abuse       No results found.    Objective   {?Quick Links Trend Vitals * Enter New Vitals * Results Review * Timeline (Adult) * Labs * Imaging * Cardiology * Procedures * Lung Cancer Screening * Surgical eConsent :93139}  /80 (BP Location: Left arm, Patient Position: Sitting, Cuff Size: Large)   Temp 97.7 °F (36.5 °C)   Resp 16   Ht 5' 7\" (1.702 m)   Wt 89.8 kg (198 lb)   SpO2 97%   BMI 31.01 kg/m²     Physical Exam  {Administrative / Billing Section (Optional):93470}  "

## 2024-11-09 DIAGNOSIS — Z00.6 ENCOUNTER FOR EXAMINATION FOR NORMAL COMPARISON OR CONTROL IN CLINICAL RESEARCH PROGRAM: ICD-10-CM

## 2024-11-10 NOTE — ASSESSMENT & PLAN NOTE
Orders:    CBC; Future    Comprehensive metabolic panel; Future    Hemoglobin A1C; Future    Lipid Panel with Direct LDL reflex; Future    Lipase; Future    Magnesium; Future    TSH, 3rd generation; Future    CBC    Comprehensive metabolic panel    Lipid Panel with Direct LDL reflex    Lipase    Magnesium    TSH, 3rd generation

## 2024-11-10 NOTE — PROGRESS NOTES
Ambulatory Visit  Name: Agustina Matthew      : 1965      MRN: [id number]  Encounter Provider: Lani Heath MD  Encounter Date: 2024   Encounter department: [facility]    Assessment & Plan  Physical exam    Orders:    CBC; Future    Comprehensive metabolic panel; Future    Hemoglobin A1C; Future    Lipid Panel with Direct LDL reflex; Future    Lipase; Future    Magnesium; Future    TSH, 3rd generation; Future    CBC    Comprehensive metabolic panel    Lipid Panel with Direct LDL reflex    Lipase    Magnesium    TSH, 3rd generation    Encounter for screening mammogram for breast cancer    Orders:    Mammo screening bilateral w 3d and cad; Future    Encounter for immunization    Orders:    influenza vaccine, recombinant, PF, 0.5 mL IM (Flublok)    Abnormal blood sugar    Orders:    Comprehensive metabolic panel; Future    Comprehensive metabolic panel    Essential hypertension    Orders:    Comprehensive metabolic panel; Future    Magnesium; Future    Comprehensive metabolic panel    Magnesium    Mixed hyperlipidemia    Orders:    Lipid Panel with Direct LDL reflex; Future    Lipase; Future    Lipid Panel with Direct LDL reflex    Lipase    Screening for diabetes mellitus    Orders:    Comprehensive metabolic panel; Future    Hemoglobin A1C; Future    Comprehensive metabolic panel    Screening for thyroid disorder    Orders:    TSH, 3rd generation; Future    TSH, 3rd generation    Screening for deficiency anemia    Orders:    CBC; Future    CBC    Thyroid fullness    Orders:    Thyroid Antibodies Panel; Future    BMI 31.0-31.9,adult            History of Present Illness   {?Quick Links Encounters * My Last Note * Last Note in Specialty * Snapshot * Since Last Visit * History :41999}  HPI    {History obtained from (Optional):07291}  Review of Systems   Constitutional:  Positive for fatigue. Negative for fever.   HENT:  Negative for sinus pressure.    Eyes:  Negative for  photophobia and pain.   Respiratory: Negative.     Cardiovascular: Negative.    Gastrointestinal:  Negative for nausea and vomiting.        GERD   Musculoskeletal:  Positive for arthralgias and neck pain.   Allergic/Immunologic: Positive for environmental allergies.   Neurological:  Negative for dizziness.   Psychiatric/Behavioral:  Positive for sleep disturbance. Negative for hallucinations and self-injury.      Past Medical History   Past Medical History:   Diagnosis Date    Allergic     Anxiety     HL (hearing loss)     Hyperlipidemia     Hypertension     Lyme disease 07/13/2007    Neuropathy     last assessed: 10/16/14    Osteoarthritis     PONV (postoperative nausea and vomiting)     Seasonal allergies     Sleep apnea     snoring    Vascular disorder     Visual impairment      Past Surgical History:   Procedure Laterality Date    BREAST SURGERY      MYOMECTOMY      SKIN BIOPSY      SCC, BCC    VARICOSE VEIN SURGERY      Ligation     Family History   Problem Relation Age of Onset    Heart disease Mother     Osteoporosis Mother     Vascular Disease Mother         vericose veins    Heart disease Father     Hypertension Father     ADD / ADHD Father     Breast cancer Sister     Atrial fibrillation Brother     Alzheimer's disease Maternal Grandmother         dementia    Breast cancer Maternal Grandmother     Colon cancer Maternal Grandfather     Alzheimer's disease Maternal Uncle         dementia    Heart disease Other     Breast cancer Other      Current Outpatient Medications on File Prior to Visit   Medication Sig Dispense Refill    Calcium Carb-Cholecalciferol 600-500 MG-UNIT CAPS Take by mouth      Cholecalciferol (VITAMIN D3) 1000 units CAPS Take by mouth      citalopram (CeleXA) 20 mg tablet TAKE ONE TABLET BY MOUTH EVERY DAY 30 tablet 5    Docosahexaenoic Acid (DHA) 200 MG CAPS Take by mouth      ezetimibe (ZETIA) 10 mg tablet TAKE ONE TABLET BY MOUTH EVERY DAY 90 tablet 3    metoprolol succinate (TOPROL-XL)  25 mg 24 hr tablet TAKE ONE TABLET BY MOUTH EVERY DAY 30 tablet 5    Omega-3 Fatty Acids (FISH OIL) 1,000 mg Take by mouth      Probiotic Product (PROBIOTIC-10) CAPS Take by mouth      zinc gluconate 50 mg tablet Take 50 mg by mouth daily      ALPRAZolam (XANAX) 0.25 mg tablet Take 1 tablet (0.25 mg total) by mouth 2 (two) times a day as needed for anxiety (Patient not taking: Reported on 2024) 30 tablet 0     No current facility-administered medications on file prior to visit.     Allergies   Allergen Reactions    Codeine Nausea Only     Reaction Date: 2004; Annotation - [id number]: dizzy  /jjw      Current Outpatient Medications on File Prior to Visit   Medication Sig Dispense Refill    Calcium Carb-Cholecalciferol 600-500 MG-UNIT CAPS Take by mouth      Cholecalciferol (VITAMIN D3) 1000 units CAPS Take by mouth      citalopram (CeleXA) 20 mg tablet TAKE ONE TABLET BY MOUTH EVERY DAY 30 tablet 5    Docosahexaenoic Acid (DHA) 200 MG CAPS Take by mouth      ezetimibe (ZETIA) 10 mg tablet TAKE ONE TABLET BY MOUTH EVERY DAY 90 tablet 3    metoprolol succinate (TOPROL-XL) 25 mg 24 hr tablet TAKE ONE TABLET BY MOUTH EVERY DAY 30 tablet 5    Omega-3 Fatty Acids (FISH OIL) 1,000 mg Take by mouth      Probiotic Product (PROBIOTIC-10) CAPS Take by mouth      zinc gluconate 50 mg tablet Take 50 mg by mouth daily      ALPRAZolam (XANAX) 0.25 mg tablet Take 1 tablet (0.25 mg total) by mouth 2 (two) times a day as needed for anxiety (Patient not taking: Reported on 2024) 30 tablet 0     No current facility-administered medications on file prior to visit.      Social History     Tobacco Use    Smoking status: Former     Current packs/day: 0.00     Average packs/day: 0.3 packs/day for 33.4 years (8.3 ttl pk-yrs)     Types: Cigarettes     Start date: 1976     Quit date: 2010     Years since quittin.8    Smokeless tobacco: Former    Tobacco comments:     off and onsmoker   Vaping Use    Vaping status: Every  "Day    Substances: THC   Substance and Sexual Activity    Alcohol use: Yes     Alcohol/week: 5.0 standard drinks of alcohol     Types: 5 Glasses of wine per week     Comment: occasional, social    Drug use: Yes     Frequency: 5.0 times per week     Types: Marijuana     Comment: vape THC oil    Sexual activity: Yes     Partners: Male     Birth control/protection: Male Sterilization         Objective   {?Quick Links Trend Vitals * Enter New Vitals * Results Review * Timeline (Adult) * Labs * Imaging * Cardiology * Procedures * Lung Cancer Screening * Surgical eConsent :50895}  /80 (BP Location: Left arm, Patient Position: Sitting, Cuff Size: Large)   Temp 97.7 °F (36.5 °C)   Resp 16   Ht 5' 7\" (1.702 m)   Wt 89.8 kg (198 lb)   SpO2 97%   BMI 31.01 kg/m²     Physical Exam  Vitals and nursing note reviewed.   Constitutional:       General: She is not in acute distress.     Appearance: Normal appearance.      Comments: OW   HENT:      Head: Normocephalic and atraumatic.      Mouth/Throat:      Pharynx: No oropharyngeal exudate.   Eyes:      Conjunctiva/sclera: Conjunctivae normal.   Cardiovascular:      Rate and Rhythm: Normal rate and regular rhythm.      Heart sounds: Normal heart sounds.   Pulmonary:      Effort: Pulmonary effort is normal. No respiratory distress.      Breath sounds: Normal breath sounds.   Abdominal:      General: Bowel sounds are normal.      Palpations: Abdomen is soft.      Tenderness: There is no abdominal tenderness. There is no right CVA tenderness or left CVA tenderness.   Musculoskeletal:         General: Normal range of motion.      Cervical back: Neck supple. No tenderness.      Right lower leg: No edema.      Left lower leg: No edema.   Skin:     General: Skin is warm and dry.      Coloration: Skin is not jaundiced.      Findings: No rash.   Neurological:      General: No focal deficit present.      Mental Status: She is alert and oriented to person, place, and time.      " Cranial Nerves: No cranial nerve deficit.   Psychiatric:         Mood and Affect: Mood normal.       {Administrative / Billing Section (Optional):38820}

## 2024-11-10 NOTE — PROGRESS NOTES
Adult Annual Physical  Name: Agustina Matthew      : 1965      MRN: [id number]  Encounter Provider: Lani Heath MD  Encounter Date: 2024   Encounter department: [facility]    Assessment & Plan  Physical exam    Orders:    CBC; Future    Comprehensive metabolic panel; Future    Hemoglobin A1C; Future    Lipid Panel with Direct LDL reflex; Future    Lipase; Future    Magnesium; Future    TSH, 3rd generation; Future    CBC    Comprehensive metabolic panel    Lipid Panel with Direct LDL reflex    Lipase    Magnesium    TSH, 3rd generation    Encounter for screening mammogram for breast cancer    Orders:    Mammo screening bilateral w 3d and cad; Future    Encounter for immunization    Orders:    influenza vaccine, recombinant, PF, 0.5 mL IM (Flublok)    Abnormal blood sugar    Orders:    Comprehensive metabolic panel; Future    Comprehensive metabolic panel    Essential hypertension    Orders:    Comprehensive metabolic panel; Future    Magnesium; Future    Comprehensive metabolic panel    Magnesium    Mixed hyperlipidemia    Orders:    Lipid Panel with Direct LDL reflex; Future    Lipase; Future    Lipid Panel with Direct LDL reflex    Lipase    Screening for diabetes mellitus    Orders:    Comprehensive metabolic panel; Future    Hemoglobin A1C; Future    Comprehensive metabolic panel    Screening for thyroid disorder    Orders:    TSH, 3rd generation; Future    TSH, 3rd generation    Screening for deficiency anemia    Orders:    CBC; Future    CBC    Thyroid fullness    Orders:    Thyroid Antibodies Panel; Future    BMI 31.0-31.9,adult         Immunizations and preventive care screenings were discussed with patient today. Appropriate education was printed on patient's after visit summary.    Counseling:  Alcohol/drug use: discussed moderation in alcohol intake, the recommendations for healthy alcohol use, and avoidance of illicit drug use.  Dental Health: discussed  importance of regular tooth brushing, flossing, and dental visits.  Injury prevention: discussed safety/seat belts, safety helmets, smoke detectors, carbon monoxide detectors, and smoking near bedding or upholstery  Exercise: the importance of regular exercise/physical activity was discussed. Recommend exercise 3-5 times per week for at least 30 minutes.          History of Present Illness     Adult Annual Physical  Review of Systems  Past Medical History   Past Medical History:   Diagnosis Date    Allergic     Anxiety     HL (hearing loss)     Hyperlipidemia     Hypertension     Lyme disease 07/13/2007    Neuropathy     last assessed: 10/16/14    Osteoarthritis     PONV (postoperative nausea and vomiting)     Seasonal allergies     Sleep apnea     snoring    Vascular disorder     Visual impairment      Past Surgical History:   Procedure Laterality Date    BREAST SURGERY      MYOMECTOMY      SKIN BIOPSY      SCC, BCC    VARICOSE VEIN SURGERY      Ligation     Family History   Problem Relation Age of Onset    Heart disease Mother     Osteoporosis Mother     Vascular Disease Mother         vericose veins    Heart disease Father     Hypertension Father     ADD / ADHD Father     Breast cancer Sister     Atrial fibrillation Brother     Alzheimer's disease Maternal Grandmother         dementia    Breast cancer Maternal Grandmother     Colon cancer Maternal Grandfather     Alzheimer's disease Maternal Uncle         dementia    Heart disease Other     Breast cancer Other      Current Outpatient Medications on File Prior to Visit   Medication Sig Dispense Refill    Calcium Carb-Cholecalciferol 600-500 MG-UNIT CAPS Take by mouth      Cholecalciferol (VITAMIN D3) 1000 units CAPS Take by mouth      citalopram (CeleXA) 20 mg tablet TAKE ONE TABLET BY MOUTH EVERY DAY 30 tablet 5    Docosahexaenoic Acid (DHA) 200 MG CAPS Take by mouth      ezetimibe (ZETIA) 10 mg tablet TAKE ONE TABLET BY MOUTH EVERY DAY 90 tablet 3    metoprolol  succinate (TOPROL-XL) 25 mg 24 hr tablet TAKE ONE TABLET BY MOUTH EVERY DAY 30 tablet 5    Omega-3 Fatty Acids (FISH OIL) 1,000 mg Take by mouth      Probiotic Product (PROBIOTIC-10) CAPS Take by mouth      zinc gluconate 50 mg tablet Take 50 mg by mouth daily      ALPRAZolam (XANAX) 0.25 mg tablet Take 1 tablet (0.25 mg total) by mouth 2 (two) times a day as needed for anxiety (Patient not taking: Reported on 2024) 30 tablet 0     No current facility-administered medications on file prior to visit.     Allergies   Allergen Reactions    Codeine Nausea Only     Reaction Date: 2004; Annotation - [id number]: dizzy  /jjw      Current Outpatient Medications on File Prior to Visit   Medication Sig Dispense Refill    Calcium Carb-Cholecalciferol 600-500 MG-UNIT CAPS Take by mouth      Cholecalciferol (VITAMIN D3) 1000 units CAPS Take by mouth      citalopram (CeleXA) 20 mg tablet TAKE ONE TABLET BY MOUTH EVERY DAY 30 tablet 5    Docosahexaenoic Acid (DHA) 200 MG CAPS Take by mouth      ezetimibe (ZETIA) 10 mg tablet TAKE ONE TABLET BY MOUTH EVERY DAY 90 tablet 3    metoprolol succinate (TOPROL-XL) 25 mg 24 hr tablet TAKE ONE TABLET BY MOUTH EVERY DAY 30 tablet 5    Omega-3 Fatty Acids (FISH OIL) 1,000 mg Take by mouth      Probiotic Product (PROBIOTIC-10) CAPS Take by mouth      zinc gluconate 50 mg tablet Take 50 mg by mouth daily      ALPRAZolam (XANAX) 0.25 mg tablet Take 1 tablet (0.25 mg total) by mouth 2 (two) times a day as needed for anxiety (Patient not taking: Reported on 2024) 30 tablet 0     No current facility-administered medications on file prior to visit.      Social History     Tobacco Use    Smoking status: Former     Current packs/day: 0.00     Average packs/day: 0.3 packs/day for 33.4 years (8.3 ttl pk-yrs)     Types: Cigarettes     Start date: 1976     Quit date: 2010     Years since quittin.8    Smokeless tobacco: Former    Tobacco comments:     off and onsmoker   Vaping Use     "Vaping status: Every Day    Substances: THC   Substance and Sexual Activity    Alcohol use: Yes     Alcohol/week: 5.0 standard drinks of alcohol     Types: 5 Glasses of wine per week     Comment: occasional, social    Drug use: Yes     Frequency: 5.0 times per week     Types: Marijuana     Comment: vape THC oil    Sexual activity: Yes     Partners: Male     Birth control/protection: Male Sterilization     Current Outpatient Medications:     Calcium Carb-Cholecalciferol 600-500 MG-UNIT CAPS, Take by mouth, Disp: , Rfl:     Cholecalciferol (VITAMIN D3) 1000 units CAPS, Take by mouth, Disp: , Rfl:     citalopram (CeleXA) 20 mg tablet, TAKE ONE TABLET BY MOUTH EVERY DAY, Disp: 30 tablet, Rfl: 5    Docosahexaenoic Acid (DHA) 200 MG CAPS, Take by mouth, Disp: , Rfl:     ezetimibe (ZETIA) 10 mg tablet, TAKE ONE TABLET BY MOUTH EVERY DAY, Disp: 90 tablet, Rfl: 3    metoprolol succinate (TOPROL-XL) 25 mg 24 hr tablet, TAKE ONE TABLET BY MOUTH EVERY DAY, Disp: 30 tablet, Rfl: 5    Omega-3 Fatty Acids (FISH OIL) 1,000 mg, Take by mouth, Disp: , Rfl:     Probiotic Product (PROBIOTIC-10) CAPS, Take by mouth, Disp: , Rfl:     zinc gluconate 50 mg tablet, Take 50 mg by mouth daily, Disp: , Rfl:     ALPRAZolam (XANAX) 0.25 mg tablet, Take 1 tablet (0.25 mg total) by mouth 2 (two) times a day as needed for anxiety (Patient not taking: Reported on 11/8/2024), Disp: 30 tablet, Rfl: 0     Objective     /80 (BP Location: Left arm, Patient Position: Sitting, Cuff Size: Large)   Temp 97.7 °F (36.5 °C)   Resp 16   Ht 5' 7\" (1.702 m)   Wt 89.8 kg (198 lb)   SpO2 97%   BMI 31.01 kg/m²       Allergies   Allergen Reactions    Codeine Nausea Only     Reaction Date: 16Feb2004; Annotation - [id number]: dizzy  /jjw     Immunization History   Administered Date(s) Administered    COVID-19 MODERNA VACC 0.5 ML IM 03/11/2021, 03/11/2021, 04/13/2021, 04/13/2021, 11/05/2021    Influenza Injectable, MDCK, Preservative Free, Quadrivalent " 10/08/2019    Influenza Quadrivalent Preservative Free 3 years and older IM 09/18/2017    Influenza Recombinant Preservative Free Im 11/08/2024    Influenza, injectable, quadrivalent, preservative free 0.5 mL 10/05/2020, 11/06/2023    Influenza, recombinant, quadrivalent,injectable, preservative free 10/22/2021, 11/02/2022    Influenza, seasonal, injectable 10/16/2014, 10/09/2015, 11/30/2016    Tdap 07/25/2009       Physical Exam  Vitals and nursing note reviewed.   Constitutional:       General: She is not in acute distress.     Appearance: Normal appearance.      Comments: OW   HENT:      Head: Normocephalic and atraumatic.      Mouth/Throat:      Pharynx: No oropharyngeal exudate.   Eyes:      General: No scleral icterus.     Conjunctiva/sclera: Conjunctivae normal.   Cardiovascular:      Rate and Rhythm: Normal rate and regular rhythm.      Heart sounds: Normal heart sounds.   Pulmonary:      Effort: Pulmonary effort is normal. No respiratory distress.      Breath sounds: Normal breath sounds.   Abdominal:      General: Bowel sounds are normal.      Palpations: Abdomen is soft.      Tenderness: There is no abdominal tenderness. There is no right CVA tenderness or left CVA tenderness.   Musculoskeletal:         General: Normal range of motion.      Cervical back: Neck supple. No tenderness.      Right lower leg: No edema.      Left lower leg: No edema.   Skin:     General: Skin is warm and dry.      Coloration: Skin is not jaundiced.      Findings: No rash.   Neurological:      General: No focal deficit present.      Mental Status: She is alert and oriented to person, place, and time.      Cranial Nerves: No cranial nerve deficit.   Psychiatric:         Mood and Affect: Mood normal.

## 2024-11-10 NOTE — ASSESSMENT & PLAN NOTE
Orders:    Comprehensive metabolic panel; Future    Magnesium; Future    Comprehensive metabolic panel    Magnesium

## 2024-11-10 NOTE — ASSESSMENT & PLAN NOTE
Orders:    Lipid Panel with Direct LDL reflex; Future    Lipase; Future    Lipid Panel with Direct LDL reflex    Lipase     TOKEN:'[id number]'

## 2024-11-10 NOTE — ASSESSMENT & PLAN NOTE
Orders:    Lipid Panel with Direct LDL reflex; Future    Lipase; Future    Lipid Panel with Direct LDL reflex    Lipase

## 2024-11-12 ENCOUNTER — APPOINTMENT (OUTPATIENT)
Dept: LAB | Facility: CLINIC | Age: 59
End: 2024-11-12

## 2024-11-12 DIAGNOSIS — Z00.6 ENCOUNTER FOR EXAMINATION FOR NORMAL COMPARISON OR CONTROL IN CLINICAL RESEARCH PROGRAM: ICD-10-CM

## 2024-11-12 PROCEDURE — 36415 COLL VENOUS BLD VENIPUNCTURE: CPT

## 2024-11-14 LAB
ALBUMIN SERPL-MCNC: 4.2 G/DL (ref 3.6–5.1)
ALBUMIN/GLOB SERPL: 2 (CALC) (ref 1–2.5)
ALP SERPL-CCNC: 58 U/L (ref 37–153)
ALT SERPL-CCNC: 15 U/L (ref 6–29)
AST SERPL-CCNC: 14 U/L (ref 10–35)
BILIRUB SERPL-MCNC: 1.6 MG/DL (ref 0.2–1.2)
BUN SERPL-MCNC: 14 MG/DL (ref 7–25)
BUN/CREAT SERPL: ABNORMAL (CALC) (ref 6–22)
CALCIUM SERPL-MCNC: 9.5 MG/DL (ref 8.6–10.4)
CHLORIDE SERPL-SCNC: 104 MMOL/L (ref 98–110)
CHOLEST SERPL-MCNC: 220 MG/DL
CHOLEST/HDLC SERPL: 4.6 (CALC)
CO2 SERPL-SCNC: 32 MMOL/L (ref 20–32)
CREAT SERPL-MCNC: 0.66 MG/DL (ref 0.5–1.05)
ERYTHROCYTE [DISTWIDTH] IN BLOOD BY AUTOMATED COUNT: 11.8 % (ref 11–15)
GFR/BSA.PRED SERPLBLD CYS-BASED-ARV: 100 ML/MIN/1.73M2
GLOBULIN SER CALC-MCNC: 2.1 G/DL (CALC) (ref 1.9–3.7)
GLUCOSE SERPL-MCNC: 103 MG/DL (ref 65–99)
HCT VFR BLD AUTO: 42.9 % (ref 35–45)
HDLC SERPL-MCNC: 48 MG/DL
HGB BLD-MCNC: 14.2 G/DL (ref 11.7–15.5)
LDLC SERPL CALC-MCNC: 148 MG/DL (CALC)
LIPASE SERPL-CCNC: 23 U/L (ref 7–60)
MAGNESIUM SERPL-MCNC: 2.1 MG/DL (ref 1.5–2.5)
MCH RBC QN AUTO: 30.1 PG (ref 27–33)
MCHC RBC AUTO-ENTMCNC: 33.1 G/DL (ref 32–36)
MCV RBC AUTO: 90.9 FL (ref 80–100)
NONHDLC SERPL-MCNC: 172 MG/DL (CALC)
PLATELET # BLD AUTO: 268 THOUSAND/UL (ref 140–400)
PMV BLD REES-ECKER: 9.4 FL (ref 7.5–12.5)
POTASSIUM SERPL-SCNC: 4.5 MMOL/L (ref 3.5–5.3)
PROT SERPL-MCNC: 6.3 G/DL (ref 6.1–8.1)
RBC # BLD AUTO: 4.72 MILLION/UL (ref 3.8–5.1)
SODIUM SERPL-SCNC: 142 MMOL/L (ref 135–146)
TRIGL SERPL-MCNC: 121 MG/DL
TSH SERPL-ACNC: 2.79 MIU/L (ref 0.4–4.5)
WBC # BLD AUTO: 5.3 THOUSAND/UL (ref 3.8–10.8)

## 2024-11-23 LAB
APOB+LDLR+PCSK9 GENE MUT ANL BLD/T: NOT DETECTED
BRCA1+BRCA2 DEL+DUP + FULL MUT ANL BLD/T: NOT DETECTED
MLH1+MSH2+MSH6+PMS2 GN DEL+DUP+FUL M: NOT DETECTED

## 2024-11-25 ENCOUNTER — TELEPHONE (OUTPATIENT)
Age: 59
End: 2024-11-25

## 2024-11-25 DIAGNOSIS — M17.12 PRIMARY OSTEOARTHRITIS OF LEFT KNEE: Primary | ICD-10-CM

## 2024-11-25 NOTE — TELEPHONE ENCOUNTER
Caller: patient   Doctor/office: Austin SHEIKH#: [phone]      Agustina  called to start auth/delivery for VISCO(Gel) injections.    Body Part: l knee   Pain Level (scale 1-10): 6          Educated patient on Visco procedure. Auth team will review insurance and process the request appropriately. Patient will be contacted if the have any questions and when ready to schedule.

## 2024-12-10 ENCOUNTER — PROCEDURE VISIT (OUTPATIENT)
Dept: OBGYN CLINIC | Facility: CLINIC | Age: 59
End: 2024-12-10
Payer: COMMERCIAL

## 2024-12-10 DIAGNOSIS — M17.12 PRIMARY OSTEOARTHRITIS OF LEFT KNEE: Primary | ICD-10-CM

## 2024-12-10 PROCEDURE — 99213 OFFICE O/P EST LOW 20 MIN: CPT | Performed by: ORTHOPAEDIC SURGERY

## 2024-12-16 NOTE — PROGRESS NOTES
Ortho Sports Medicine Follow Up Patient Visit     Assesment:   59 y.o. female with left primary knee osteoarthritis    Plan:    Patient again is having significant symptoms related to arthritis.  We did discuss options for treatment again including further injections versus eventual definitive arthroplasty.  Would like to proceed with gel injection at this time.  Pain is currently 6 out of 10.  We will plan to get authorization for her skin injections and see her back for the shots when they are approved.        History of Present Illness:    The patient is a 59 y.o. female, following up from her known osteoarthritis knee pain.  Pain is worse again.  She is not getting any true locking episodes or feelings of instability.  However she has significant pain is limiting her ability to remain active.  Pain is currently 6 out of 10.    Knee Surgical History:  None    Past Medical, Social and Family History:  Past Medical History:   Diagnosis Date    Allergic     Anxiety     HL (hearing loss)     Hyperlipidemia     Hypertension     Lyme disease 07/13/2007    Neuropathy     last assessed: 10/16/14    Osteoarthritis     PONV (postoperative nausea and vomiting)     Seasonal allergies     Sleep apnea     snoring    Vascular disorder     Visual impairment      Past Surgical History:   Procedure Laterality Date    BREAST SURGERY      MYOMECTOMY      SKIN BIOPSY      SCC, BCC    VARICOSE VEIN SURGERY      Ligation     Allergies   Allergen Reactions    Codeine Nausea Only     Reaction Date: 16Feb2004; Annotation - [id number]: dizzy  /jjw     Current Outpatient Medications on File Prior to Visit   Medication Sig Dispense Refill    ALPRAZolam (XANAX) 0.25 mg tablet Take 1 tablet (0.25 mg total) by mouth 2 (two) times a day as needed for anxiety (Patient not taking: Reported on 11/8/2024) 30 tablet 0    Calcium Carb-Cholecalciferol 600-500 MG-UNIT CAPS Take by mouth      Cholecalciferol (VITAMIN D3) 1000 units CAPS Take by mouth       citalopram (CeleXA) 20 mg tablet TAKE ONE TABLET BY MOUTH EVERY DAY 30 tablet 5    Docosahexaenoic Acid (DHA) 200 MG CAPS Take by mouth      ezetimibe (ZETIA) 10 mg tablet TAKE ONE TABLET BY MOUTH EVERY DAY 90 tablet 3    metoprolol succinate (TOPROL-XL) 25 mg 24 hr tablet TAKE ONE TABLET BY MOUTH EVERY DAY 30 tablet 5    Omega-3 Fatty Acids (FISH OIL) 1,000 mg Take by mouth      Probiotic Product (PROBIOTIC-10) CAPS Take by mouth      zinc gluconate 50 mg tablet Take 50 mg by mouth daily       No current facility-administered medications on file prior to visit.     Social History     Socioeconomic History    Marital status: /Civil Union     Spouse name: Not on file    Number of children: Not on file    Years of education: Not on file    Highest education level: Not on file   Occupational History    Not on file   Tobacco Use    Smoking status: Former     Current packs/day: 0.00     Average packs/day: 0.3 packs/day for 33.4 years (8.3 ttl pk-yrs)     Types: Cigarettes     Start date: 1976     Quit date:      Years since quittin.9    Smokeless tobacco: Former    Tobacco comments:     off and onsmoker   Vaping Use    Vaping status: Every Day    Substances: THC   Substance and Sexual Activity    Alcohol use: Yes     Alcohol/week: 5.0 standard drinks of alcohol     Types: 5 Glasses of wine per week     Comment: occasional, social    Drug use: Yes     Frequency: 5.0 times per week     Types: Marijuana     Comment: vape THC oil    Sexual activity: Yes     Partners: Male     Birth control/protection: Male Sterilization   Other Topics Concern    Not on file   Social History Narrative    Not on file     Social Drivers of Health     Financial Resource Strain: Not on file   Food Insecurity: No Food Insecurity (2024)    Hunger Vital Sign     Worried About Running Out of Food in the Last Year: Never true     Ran Out of Food in the Last Year: Never true   Transportation Needs: No Transportation Needs  (11/8/2024)    PRAPARE - Transportation     Lack of Transportation (Medical): No     Lack of Transportation (Non-Medical): No   Physical Activity: Not on file   Stress: Not on file   Social Connections: Not on file   Intimate Partner Violence: Not on file   Housing Stability: Low Risk  (11/8/2024)    Housing Stability Vital Sign     Unable to Pay for Housing in the Last Year: No     Number of Times Moved in the Last Year: 0     Homeless in the Last Year: No         I have reviewed the past medical, surgical, social and family history, medications and allergies as documented in the EMR.    Review of systems: ROS is negative other than that noted in the HPI.  Constitutional: Negative for fatigue and fever.   HENT: Negative for sore throat.    Respiratory: Negative for shortness of breath.    Cardiovascular: Negative for chest pain.   Gastrointestinal: Negative for abdominal pain.   Endocrine: Negative for cold intolerance and heat intolerance.   Genitourinary: Negative for flank pain.   Musculoskeletal: Negative for back pain.   Skin: Negative for rash.   Allergic/Immunologic: Negative for immunocompromised state.   Neurological: Negative for dizziness.   Psychiatric/Behavioral: Negative for agitation.      Physical Exam:    There were no vitals taken for this visit.    General/Constitutional: NAD, well developed, well nourished  HENT: Normocephalic, atraumatic  CV: Intact distal pulses, regular rate  Resp: No respiratory distress or labored breathing  Lymphatic: No lymphadenopathy palpated  Neuro: Alert and Oriented x 3, no focal deficits  Psych: Normal mood, normal affect  Skin: Warm, dry, no rashes, no erythema      Knee Exam:   No significant skin lesions or deformity.  Range of motion from 0 to 130. Limited patellar mobility.  Mild medial joint line tenderness. No lateral joint line tenderness. Knee is stable to varus stress, Lachman, and posterior drawer. There is valgus gapping with a firm endpoint on exam.  Neurovascularly intact distally      Knee Imaging    X-rays of the knees reviewed from 7/18/23. These show no acute fractures. Medial compartment degenerative changes bilaterally, including joint space narrowing, osteophytosis, and subchondral sclerosis. Patella is well centered on the trochlea.    Procedures

## 2024-12-17 ENCOUNTER — PROCEDURE VISIT (OUTPATIENT)
Dept: OBGYN CLINIC | Facility: CLINIC | Age: 59
End: 2024-12-17
Payer: COMMERCIAL

## 2024-12-17 DIAGNOSIS — G89.29 CHRONIC PAIN OF LEFT KNEE: ICD-10-CM

## 2024-12-17 DIAGNOSIS — M17.12 PRIMARY OSTEOARTHRITIS OF LEFT KNEE: Primary | ICD-10-CM

## 2024-12-17 DIAGNOSIS — M25.562 CHRONIC PAIN OF LEFT KNEE: ICD-10-CM

## 2024-12-17 PROCEDURE — 20610 DRAIN/INJ JOINT/BURSA W/O US: CPT

## 2024-12-17 NOTE — PROGRESS NOTES
Large joint arthrocentesis: L knee  Universal Protocol:  Consent: Verbal consent obtained.  Risks and benefits: risks, benefits and alternatives were discussed  Consent given by: patient  Timeout called at: 12/17/2024 8:19 AM.  Patient understanding: patient states understanding of the procedure being performed  Patient consent: the patient's understanding of the procedure matches consent given  Site marked: the operative site was marked  Radiology Images displayed and confirmed. If images not available, report reviewed: imaging studies available  Patient identity confirmed: verbally with patient  Supporting Documentation  Indications: pain   Procedure Details  Location: knee - L knee  Needle size: 22 G  Ultrasound guidance: no  Approach: anterolateral  Medications administered: 2 mL sodium hyaluronate 16.8 MG/2ML    Patient tolerance: patient tolerated the procedure well with no immediate complications  Dressing:  Sterile dressing applied      Agustina presents for her second of 3 Gelsyn-3 left knee injections to treat their left primary knee osteoarthritis. Today, the patient reports 3/10 left knee pain. On exam, the patient has ROM from 0 to 115+ with scant joint effusion and medial joint line tenderness. The patient tolerated the procedure well without any difficulties or complications. Post-injections were reviewed. We will plan to follow up with Agustina in 1 week for the last injection of the series.

## 2024-12-24 ENCOUNTER — PROCEDURE VISIT (OUTPATIENT)
Dept: OBGYN CLINIC | Facility: CLINIC | Age: 59
End: 2024-12-24
Payer: COMMERCIAL

## 2024-12-24 DIAGNOSIS — M17.12 PRIMARY OSTEOARTHRITIS OF LEFT KNEE: Primary | ICD-10-CM

## 2024-12-24 PROCEDURE — 20610 DRAIN/INJ JOINT/BURSA W/O US: CPT | Performed by: ORTHOPAEDIC SURGERY

## 2024-12-24 NOTE — PROGRESS NOTES
Large joint arthrocentesis: L knee  Universal Protocol:  Consent: Verbal consent obtained.  Risks and benefits: risks, benefits and alternatives were discussed  Consent given by: patient  Timeout called at: 12/17/2024 8:19 AM.  Patient understanding: patient states understanding of the procedure being performed  Patient consent: the patient's understanding of the procedure matches consent given  Site marked: the operative site was marked  Radiology Images displayed and confirmed. If images not available, report reviewed: imaging studies available  Patient identity confirmed: verbally with patient  Supporting Documentation  Indications: pain   Procedure Details  Location: knee - L knee  Needle size: 22 G  Ultrasound guidance: no  Approach: anterolateral  Medications administered: 2 mL sodium hyaluronate 16.8 MG/2ML    Patient tolerance: patient tolerated the procedure well with no immediate complications  Dressing:  Sterile dressing applied      Agustina presents for her 3rd of 3 Gelsyn-3 left knee injections to treat their left primary knee osteoarthritis. Today, the patient reports 1/10 left knee pain. On exam, the patient has ROM from 0 to 115+ with scant joint effusion and medial joint line tenderness. The patient tolerated the procedure well without any difficulties or complications. Post-injections were reviewed.  She is can a follow-up with me as needed in the future depending her response this injection.

## 2025-01-16 DIAGNOSIS — I10 ESSENTIAL HYPERTENSION: ICD-10-CM

## 2025-01-16 DIAGNOSIS — F32.A ANXIETY AND DEPRESSION: ICD-10-CM

## 2025-01-16 DIAGNOSIS — F41.9 ANXIETY AND DEPRESSION: ICD-10-CM

## 2025-01-16 RX ORDER — METOPROLOL SUCCINATE 25 MG/1
25 TABLET, EXTENDED RELEASE ORAL DAILY
Qty: 30 TABLET | Refills: 5 | Status: SHIPPED | OUTPATIENT
Start: 2025-01-16

## 2025-01-16 RX ORDER — CITALOPRAM HYDROBROMIDE 20 MG/1
20 TABLET ORAL DAILY
Qty: 30 TABLET | Refills: 5 | Status: SHIPPED | OUTPATIENT
Start: 2025-01-16

## 2025-02-06 DIAGNOSIS — G47.9 SLEEP DISTURBANCE: Primary | ICD-10-CM

## 2025-02-10 NOTE — PROGRESS NOTES
"Name: Agustina Matthew      : 1965      MRN: [id number]  Encounter Provider: Ezra Penaloza MD  Encounter Date: 2025   Encounter department: [facility]  :  Assessment & Plan  Sleep disturbance  Patient presenting for sleep apnea evaluation with loud snoring, witnessed apneas. Also noticed choking/gasping sounds nocturnally. Dry throat, nocturnal GERD and multiple awakenings. Exam showed restricted airway with Mallampati score 4. With elevated BMI, as well as the previously mentioned physical exam findings and clinical symptoms, patient has high pretest probability for sleep apnea. Patient agreeable to trialing CPAP if sleep study shows sleep apnea.    Plan:  Discussed with Dr. Penaloza  Discussed with patient the pathophysiology of OSAS and medical conditions associated with OSAS such as DM, HTN, CAD, Depression, Stroke, Headache.  Treatment options including surgery, Dental appliances, positional therapy, and CPAP were discussed.  I have ordered a Home Sleep Apnea Test (HSAT) to evaluate for sleep-disordered breathing. Should this be negative/inconclusive, due to the known limitations of HSAT, I will order an in lab polysomnogram (PSG) to ensure that sleep apnea is not present.  Advised patient to avoid activities that could harm self or others when tired/sleepy, including driving.  Discussed importance of good sleep hygiene.  Pending the results of the sleep study, I will plan to order CPAP with a subsequent compliance follow-up.  Patient agreeable with above.   Orders:    Ambulatory Referral to Sleep Medicine    Home Study; Future    Snoring  As in \"sleep disturbance\"       Essential hypertension  If CHEVY found on sleep study, CPAP treatment can improve HTN outcomes.       BMI 29.0-29.9,adult  Discussed weight loss for treatment of possible CHEVY.           History of Present Illness   HPI  59 year old female with a history of hypertension, anxiety/depression, hyperlipidemia, who is " presenting for sleep apnea evaluation. Patient loud snoring for getting worse for at least 10 years. Sleeps right beside  and notices witnessed apneas. Also having choking and gasping sound in middle of the night. Patient somewhat refreshed. Patient denies headaches but has dry throat. Patient considers herself a nose breather. Weight has been stable. No significant nose or nasal passage problems. No tonsil/uvula issues. Sleep on variable positions. Patient does have some nocturnal GERD in the middle of the night.     Patient when on bed and uses Marijuana to help fall asleep and usually takes around a half an hour to fall asleep. Patient wakes up 4 times per night to use restroom and because of tossing and turning and able to fall back asleep in 15 minutes. 8-9 hours to sleep. No naps during the day.     and sister has sleep apnea.    Patient has some restlessness in the legs but able to sleep fairly quickly once in bed. No significant sleep walking, sleep talking, dream enactments. No sleep related hallucinations, sleep paralysis and cataplexy.    1 cup of coffee in the morning. Drink wine socially 5 glasses a week with no side effects. No smoking, uses marijuana to help with sleeping. No drowsy driving.    Sitting and reading: (Patient-Rptd) Slight chance of dozing  Watching TV: (Patient-Rptd) Moderate chance of dozing  Sitting, inactive in a public place (e.g. a theatre or a meeting): (Patient-Rptd) Would never doze  As a passenger in a car for an hour without a break: (Patient-Rptd) Slight chance of dozing  Lying down to rest in the afternoon when circumstances permit: (Patient-Rptd) Slight chance of dozing  Sitting and talking to someone: (Patient-Rptd) Would never doze  Sitting quietly after a lunch without alcohol: (Patient-Rptd) Would never doze  In a car, while stopped for a few minutes in traffic: (Patient-Rptd) Would never doze  Total score: (Patient-Rptd) 5     Review of Systems    Respiratory:  Negative for chest tightness and shortness of breath.    Cardiovascular:  Negative for chest pain and palpitations.   Gastrointestinal:  Negative for constipation and diarrhea.   Endocrine: Negative for polyuria.   Genitourinary:  Negative for dysuria and hematuria.   Musculoskeletal:  Negative for arthralgias, back pain and neck pain.   Skin:  Negative for rash.   Neurological:  Negative for weakness and numbness.   Psychiatric/Behavioral:  Negative for dysphoric mood. The patient is not nervous/anxious.      Pertinent positives/negatives included in HPI and also as noted:     Pertinent Medical History     Medical History Reviewed by provider this encounter:     .  Past Medical History   Past Medical History:   Diagnosis Date    Allergic     Anxiety     HL (hearing loss)     Hyperlipidemia     Hypertension     Lyme disease 07/13/2007    Neuropathy     last assessed: 10/16/14    Osteoarthritis     PONV (postoperative nausea and vomiting)     Seasonal allergies     Sleep apnea     snoring    Vascular disorder     Visual impairment      Past Surgical History:   Procedure Laterality Date    BREAST SURGERY      MYOMECTOMY      SKIN BIOPSY      SCC, BCC    VARICOSE VEIN SURGERY      Ligation     Family History   Problem Relation Age of Onset    Heart disease Mother     Osteoporosis Mother     Vascular Disease Mother         vericose veins    Heart disease Father     Hypertension Father     ADD / ADHD Father     Breast cancer Sister     Atrial fibrillation Brother     Alzheimer's disease Maternal Grandmother         dementia    Breast cancer Maternal Grandmother     Colon cancer Maternal Grandfather     Alzheimer's disease Maternal Uncle         dementia    Heart disease Other     Breast cancer Other       reports that she quit smoking about 15 years ago. Her smoking use included cigarettes. She started smoking about 48 years ago. She has a 8.3 pack-year smoking history. She has quit using smokeless  tobacco. She reports current alcohol use of about 5.0 standard drinks of alcohol per week. She reports current drug use. Frequency: 5.00 times per week. Drug: Marijuana.  Current Outpatient Medications on File Prior to Visit   Medication Sig Dispense Refill    Calcium Carb-Cholecalciferol 600-500 MG-UNIT CAPS Take by mouth      Cholecalciferol (VITAMIN D3) 1000 units CAPS Take by mouth      citalopram (CeleXA) 20 mg tablet TAKE ONE TABLET BY MOUTH EVERY DAY 30 tablet 5    Docosahexaenoic Acid (DHA) 200 MG CAPS Take by mouth      ezetimibe (ZETIA) 10 mg tablet TAKE ONE TABLET BY MOUTH EVERY DAY 90 tablet 3    metoprolol succinate (TOPROL-XL) 25 mg 24 hr tablet TAKE ONE TABLET BY MOUTH EVERY DAY 30 tablet 5    Omega-3 Fatty Acids (FISH OIL) 1,000 mg Take by mouth      Probiotic Product (PROBIOTIC-10) CAPS Take by mouth      zinc gluconate 50 mg tablet Take 50 mg by mouth daily       No current facility-administered medications on file prior to visit.     Allergies   Allergen Reactions    Codeine Nausea Only     Reaction Date: 16Feb2004; Annotation - 04Sep2012: dizzy  /jjw      Current Outpatient Medications on File Prior to Visit   Medication Sig Dispense Refill    Calcium Carb-Cholecalciferol 600-500 MG-UNIT CAPS Take by mouth      Cholecalciferol (VITAMIN D3) 1000 units CAPS Take by mouth      citalopram (CeleXA) 20 mg tablet TAKE ONE TABLET BY MOUTH EVERY DAY 30 tablet 5    Docosahexaenoic Acid (DHA) 200 MG CAPS Take by mouth      ezetimibe (ZETIA) 10 mg tablet TAKE ONE TABLET BY MOUTH EVERY DAY 90 tablet 3    metoprolol succinate (TOPROL-XL) 25 mg 24 hr tablet TAKE ONE TABLET BY MOUTH EVERY DAY 30 tablet 5    Omega-3 Fatty Acids (FISH OIL) 1,000 mg Take by mouth      Probiotic Product (PROBIOTIC-10) CAPS Take by mouth      zinc gluconate 50 mg tablet Take 50 mg by mouth daily       No current facility-administered medications on file prior to visit.      Social History     Tobacco Use    Smoking status: Former      "Current packs/day: 0.00     Average packs/day: 0.3 packs/day for 33.4 years (8.3 ttl pk-yrs)     Types: Cigarettes     Start date: 8/14/1976     Quit date: 2010     Years since quitting: 15.1    Smokeless tobacco: Former    Tobacco comments:     off and onsmoker   Vaping Use    Vaping status: Every Day    Substances: THC   Substance and Sexual Activity    Alcohol use: Yes     Alcohol/week: 5.0 standard drinks of alcohol     Types: 5 Glasses of wine per week     Comment: occasional, social    Drug use: Yes     Frequency: 5.0 times per week     Types: Marijuana     Comment: vape THC oil    Sexual activity: Yes     Partners: Male     Birth control/protection: Male Sterilization     Objective   /72   Pulse 55   Ht 5' 8\" (1.727 m)   Wt 89.4 kg (197 lb)   SpO2 96%   BMI 29.95 kg/m²     Neck Circumference: 16  Mallampati Grade : Mallampati 4  Oropharynx : crowded  Tongue : Large tongue  Soft palate and uvula : Normal soft palate  Hard Palate : normal  and small maxillary torus  Neck Circumference : Neck Circumference 16\"  Neck : is thick  Nose : Normal nasal structure  Craniofacial anatomy : normal  Dental Stucture and Dentition : Mild overbite      Appearance : no distress, alert, cooperative  Mental Status. Memory, and Affect : alert and oriented, normal affect, makes good eye contact, provides a detailed history  Cardiac- : regular rate and rhythm, S1, S2 normal, no murmur, click, rub or gallop  Pulmonary : clear to auscultation bilaterally  Cranial Nerves: CN II-XII grossly intact  No peripheral edema       Data  Lab Results   Component Value Date    HGB 14.2 11/13/2024    HCT 42.9 11/13/2024    MCV 90.9 11/13/2024      Lab Results   Component Value Date    GLUCOSE 99 10/11/2017    CALCIUM 9.5 11/13/2024     10/11/2017    K 4.5 11/13/2024    CO2 32 11/13/2024     11/13/2024    BUN 14 11/13/2024    CREATININE 0.66 11/13/2024     No results found for: \"IRON\", \"TIBC\", \"FERRITIN\"  Lab Results "   Component Value Date    AST 14 11/13/2024    ALT 15 11/13/2024

## 2025-02-11 ENCOUNTER — OFFICE VISIT (OUTPATIENT)
Dept: SLEEP CENTER | Facility: CLINIC | Age: 60
End: 2025-02-11
Payer: COMMERCIAL

## 2025-02-11 VITALS
DIASTOLIC BLOOD PRESSURE: 72 MMHG | HEIGHT: 68 IN | OXYGEN SATURATION: 96 % | SYSTOLIC BLOOD PRESSURE: 108 MMHG | WEIGHT: 197 LBS | HEART RATE: 55 BPM | BODY MASS INDEX: 29.86 KG/M2

## 2025-02-11 DIAGNOSIS — G47.9 SLEEP DISTURBANCE: Primary | ICD-10-CM

## 2025-02-11 DIAGNOSIS — I10 ESSENTIAL HYPERTENSION: ICD-10-CM

## 2025-02-11 DIAGNOSIS — R06.83 SNORING: ICD-10-CM

## 2025-02-11 PROCEDURE — 99203 OFFICE O/P NEW LOW 30 MIN: CPT | Performed by: INTERNAL MEDICINE

## 2025-02-11 NOTE — PATIENT INSTRUCTIONS
CHEVY Evaluation:    I suspect you may have sleep apnea.  Sleep apnea is a serious sleep disorder that occurs when a person's breathing is interrupted during sleep. People with untreated sleep apnea stop breathing repeatedly during their sleep, sometimes hundreds of times during the night.  The most common type of sleep apnea is obstructive sleep apnea.  If left untreated, obstructive sleep apnea can result in a number of health problems including hypertension, stroke, arrhythmias, cardiomyopathy (enlargement of the muscle tissue of the heart), heart failure, diabetes, obesity, and heart attacks. It has also been found to make chronic medical conditions (such as high blood pressure, migraine headaches, and seizures (more difficult to manage.  Treatment options for obstructive sleep apnea may include positive airway pressure (PAP) therapy, oral appliance, hypoglossal nerve stimulator, nose/throat surgery, weight loss, side sleeping, or avoidance of medications or substances that can relax the airway muscles (alcohol, benzodiazepines, and opioids).  I have ordered a Home Sleep Apnea Test (HSAT) to evaluate for sleep apnea. If this is negative, I will order an in-lab polysomnogram (PSG) as discussed to be sure that sleep apnea is not present.  This test should be scheduled at your earliest convenience.   Sleep Clinic: This should be scheduled at the  before you leave today.  Avoid driving if drowsy. We recommend that if you are dozing off while driving, that you do not drive until your sleepiness is appropriately treated.  We encourage a healthy lifestyle with adequate sleep (7-9 hours per night), diet and exercise.  Recommend good sleep hygiene, as outlined below    Myself and/or my team will reach out to you via MyChart and/or phone call with the results and next steps.      Good Sleep Hygiene  Wake up at the same time every day, even on the weekends.  Use your bed for sleep and intimacy only.  If you have  been in bed awake for 30 minutes, get up and leave the bedroom. Choose a dull activity not involving a blue screen (TV, computer, handheld devices). Go back to bed when you feel sleepy.  Avoid caffeine, nicotine and alcohol before you go to bed.  Avoid large meals before you go to bed.  Avoid using screens (computers, tablets, smartphones, etc.) for at least 1 hour before bedtime  Exercise regularly, but do not exercise right before you go to bed.  Avoid daytime naps. If you do take a nap, sleep for 20-40 minutes, and not after 2pm.

## 2025-03-04 DIAGNOSIS — Z12.31 ENCOUNTER FOR SCREENING MAMMOGRAM FOR BREAST CANCER: ICD-10-CM

## 2025-03-16 DIAGNOSIS — E78.01 FAMILIAL HYPERCHOLESTEROLEMIA: ICD-10-CM

## 2025-03-16 RX ORDER — EZETIMIBE 10 MG/1
10 TABLET ORAL DAILY
Qty: 90 TABLET | Refills: 1 | Status: SHIPPED | OUTPATIENT
Start: 2025-03-16

## 2025-03-25 ENCOUNTER — HOSPITAL ENCOUNTER (OUTPATIENT)
Dept: SLEEP CENTER | Facility: CLINIC | Age: 60
Discharge: HOME | End: 2025-03-25
Payer: COMMERCIAL

## 2025-03-25 DIAGNOSIS — G47.9 SLEEP DISTURBANCE: ICD-10-CM

## 2025-03-25 PROCEDURE — G0399 HOME SLEEP TEST/TYPE 3 PORTA: HCPCS

## 2025-03-25 NOTE — PROGRESS NOTES
Home Sleep Study Documentation    HOME STUDY DEVICE: Noxturnal no                                           Sujatha G3 yes device # 17      Pre-Sleep Home Study:    Set-up and instructions performed by: Carmen    Technician performed demonstration for Patient: yes    Return demonstration performed by Patient: yes    Written instructions provided to Patient: yes    Patient signed consent form: yes        Post-Sleep Home Study:    Additional comments by Patient: pending    Home Sleep Study Failed:pending    Failure reason: pending    Reported or Detected: pending    Scored by: pending

## 2025-04-02 ENCOUNTER — RESULTS FOLLOW-UP (OUTPATIENT)
Dept: SLEEP CENTER | Facility: CLINIC | Age: 60
End: 2025-04-02

## 2025-04-02 DIAGNOSIS — G47.33 OSA (OBSTRUCTIVE SLEEP APNEA): Primary | ICD-10-CM

## 2025-04-02 PROCEDURE — 95806 SLEEP STUDY UNATT&RESP EFFT: CPT | Performed by: INTERNAL MEDICINE

## 2025-04-03 NOTE — RESULT ENCOUNTER NOTE
Please let patient know that she has severe CHEVY, oxygenation is not too bad despite that.  I recommend a trial on CPAP which she was amenable for when we talked in the office.  I prescribed CPAP

## 2025-04-04 ENCOUNTER — TELEPHONE (OUTPATIENT)
Dept: SLEEP CENTER | Facility: CLINIC | Age: 60
End: 2025-04-04

## 2025-04-04 NOTE — TELEPHONE ENCOUNTER
Home study resulted, confirms severe CHEVY (ROXANA-32.1) with mild to moderate respiratory event related oxygen desaturations.    CPAP recommended and prescription provided.    Call placed to patient, advised of above.    Patient amenable to using [employer] as DME supplier, added to Epic secure DME list for order to be processed.      Compliance appointment 6/3/2025 with Dr. Penaloza in the [place] office.

## 2025-04-04 NOTE — TELEPHONE ENCOUNTER
----- Message from Ezra Penaloza MD sent at 4/3/2025  4:57 PM EDT -----  Please let patient know that she has severe CHEVY, oxygenation is not too bad despite that.  I recommend a trial on CPAP which she was amenable for when we talked in the office.  I prescribed CPAP

## 2025-04-14 LAB

## 2025-05-23 LAB

## 2025-06-02 NOTE — PROGRESS NOTES
Name: Agustina Matthew      : 1965      MRN: [id number]  Encounter Provider: Ezra Penaloza MD  Encounter Date: 6/3/2025   Encounter department: [facility] SLEEP MEDICINE [facility]  :  Assessment & Plan  CHEVY (obstructive sleep apnea)  25 HSAT - ROXANA 32    Started on APAP and doing well without significant side effects  Fully compliant and benefiting with less snoring, decreased choking/gasping, less overnight awakenings, and improvement in hypertension    Compliance data  100%, 100%>4 hours, 30 days  Average use 7 hours 55 minutes  APAP 4-12 cm H2O EPR 1  Pressure median 7.3, P95 11.4  Leaks 95% 22.5  AHI 4.9, MILAGROS 0.2    Having some borderline leak so will keep pressures the same since she is gaining symptomatic benefit  Resupply Rx, nasal pillows, DME [employer]  FU in 1 year       Primary insomnia  Uses medical marijuana for sleep aid and this helps with sleep initiation without daytime grogginess           History of Present Illness   HPI   60 year old female with a history of hypertension, anxiety/depression, hyperlipidemia presenting for evaluation of CHEVY.     6/3/25 - Follow up - She is doing very well with CPAP.  Her blood pressure is better.  Snoring is decreased.  Sleep quality is improved.  No major side effects.  Using nasal pillows.      25 - Consult - Patient endorses loud snoring, witnessed apneas, choking/gasping in the middle of the night.  Sleep is somewhat refreshing.  She can breathe through her nose okay.  Weight has been stable.  She endorses some nocturnal acid reflux.     She primarily uses marijuana to help fall asleep and takes about an half an hour to fall asleep.  She wakes up at least 4 times a night to use the bathroom but she is able to fall back asleep.  Total about 8 to 9 hours of sleep but no naps during the day.  Former smoker quit in .  8 pack years.          Sitting and reading: (Patient-Rptd) (P) Slight chance of dozing  Watching TV: (Patient-Rptd) (P) Slight chance  of dozing  Sitting, inactive in a public place (e.g. a theatre or a meeting): (Patient-Rptd) (P) Would never doze  As a passenger in a car for an hour without a break: (Patient-Rptd) (P) Slight chance of dozing  Lying down to rest in the afternoon when circumstances permit: (Patient-Rptd) (P) Slight chance of dozing  Sitting and talking to someone: (Patient-Rptd) (P) Would never doze  Sitting quietly after a lunch without alcohol: (Patient-Rptd) (P) Would never doze  In a car, while stopped for a few minutes in traffic: (Patient-Rptd) (P) Would never doze  Total score: (Patient-Rptd) (P) 4     Review of Systems  Pertinent positives/negatives included in HPI and also as noted:     Past Medical History   Past Medical History[1]  Past Surgical History[2]  Family History[3]   reports that she quit smoking about 15 years ago. Her smoking use included cigarettes. She started smoking about 48 years ago. She has a 8.3 pack-year smoking history. She has quit using smokeless tobacco. She reports current alcohol use of about 5.0 standard drinks of alcohol per week. She reports current drug use. Frequency: 5.00 times per week. Drug: Marijuana.  Current Outpatient Medications   Medication Instructions    Calcium Carb-Cholecalciferol 600-500 MG-UNIT CAPS Take by mouth    Cholecalciferol (VITAMIN D3) 1000 units CAPS Take by mouth    citalopram (CELEXA) 20 mg, Oral, Daily    Docosahexaenoic Acid (DHA) 200 MG CAPS Take by mouth    ezetimibe (ZETIA) 10 mg, Oral, Daily    metoprolol succinate (TOPROL-XL) 25 mg, Oral, Daily    Omega-3 Fatty Acids (FISH OIL) 1,000 mg Take by mouth    Probiotic Product (PROBIOTIC-10) CAPS Take by mouth    zinc gluconate 50 mg, Daily   Allergies[4]   Objective   There were no vitals taken for this visit.       Physical Exam  Constitutional:       General: She is not in acute distress.     Appearance: Normal appearance. She is not ill-appearing, toxic-appearing or diaphoretic.     Eyes:      General: No  "scleral icterus.     Extraocular Movements: Extraocular movements intact.       Cardiovascular:      Rate and Rhythm: Normal rate.   Pulmonary:      Effort: Pulmonary effort is normal. No respiratory distress.   Abdominal:      Tenderness: There is no guarding.     Musculoskeletal:         General: Normal range of motion.      Cervical back: Normal range of motion and neck supple. No rigidity.      Right lower leg: No edema.      Left lower leg: No edema.     Neurological:      General: No focal deficit present.      Mental Status: She is alert and oriented to person, place, and time.       Visit Vitals  OB Status Postmenopausal   Smoking Status Former                                                 Data  Lab Results   Component Value Date    HGB 14.2 11/13/2024    HCT 42.9 11/13/2024    MCV 90.9 11/13/2024      Lab Results   Component Value Date    GLUCOSE 99 10/11/2017    CALCIUM 9.5 11/13/2024     10/11/2017    K 4.5 11/13/2024    CO2 32 11/13/2024     11/13/2024    BUN 14 11/13/2024    CREATININE 0.66 11/13/2024     No results found for: \"IRON\", \"TIBC\", \"FERRITIN\"  Lab Results   Component Value Date    AST 14 11/13/2024    ALT 15 11/13/2024              [1]   Past Medical History:  Diagnosis Date    Allergic     Anxiety     HL (hearing loss)     Hyperlipidemia     Hypertension     Lyme disease 07/13/2007    Neuropathy     last assessed: 10/16/14    Osteoarthritis     PONV (postoperative nausea and vomiting)     Seasonal allergies     Sleep apnea     snoring    Vascular disorder     Visual impairment    [2]   Past Surgical History:  Procedure Laterality Date    BREAST SURGERY      MYOMECTOMY      SKIN BIOPSY      SCC, BCC    VARICOSE VEIN SURGERY      Ligation   [3]   Family History  Problem Relation Name Age of Onset    Heart disease Mother Tosha Billings     Osteoporosis Mother Tosha Billings     Vascular Disease Mother Tosha Billings         vericose veins    Heart disease Father " Charles Malin     Hypertension Father Charles Malin     ADD / ADHD Father Charles Malin     Breast cancer Sister      Atrial fibrillation Brother      Alzheimer's disease Maternal Grandmother          dementia    Breast cancer Maternal Grandmother      Colon cancer Maternal Grandfather      Alzheimer's disease Maternal Uncle          dementia    Heart disease Other Grandparent     Breast cancer Other Grandmother    [4]   Allergies  Allergen Reactions    Codeine Nausea Only     Reaction Date: 16Feb2004; Annotation - [id number]: dizzy  /jjw

## 2025-06-02 NOTE — ASSESSMENT & PLAN NOTE
4/2/25 HSAT - ROXANA 32    Started on APAP and doing well without significant side effects  Fully compliant and benefiting with less snoring, decreased choking/gasping, less overnight awakenings, and improvement in hypertension    Compliance data  100%, 100%>4 hours, 30 days  Average use 7 hours 55 minutes  APAP 4-12 cm H2O EPR 1  Pressure median 7.3, P95 11.4  Leaks 95% 22.5  AHI 4.9, MILAGROS 0.2    Having some borderline leak so will keep pressures the same since she is gaining symptomatic benefit  Resupply Rx, nasal pillows, DME [employer]  FU in 1 year        5/5/2023    Hemoglobin A1C (%)   Date Value   05/02/2023 6.2 (H)     BS NF in the office is 271    Foot Ulcer:  No issues  Type of Meter   Type of Insulin Pump     [] Vials  [] Pens    Tanesha Colmenares CMA    Have you seen an endocrinologist in the past?  If so, what is their name and location? no

## 2025-06-03 ENCOUNTER — OFFICE VISIT (OUTPATIENT)
Dept: SLEEP CENTER | Facility: CLINIC | Age: 60
End: 2025-06-03
Payer: COMMERCIAL

## 2025-06-03 VITALS
DIASTOLIC BLOOD PRESSURE: 72 MMHG | SYSTOLIC BLOOD PRESSURE: 110 MMHG | WEIGHT: 201 LBS | OXYGEN SATURATION: 98 % | HEART RATE: 61 BPM | BODY MASS INDEX: 30.46 KG/M2 | HEIGHT: 68 IN

## 2025-06-03 DIAGNOSIS — G47.33 OSA (OBSTRUCTIVE SLEEP APNEA): Primary | ICD-10-CM

## 2025-06-03 DIAGNOSIS — F51.01 PRIMARY INSOMNIA: ICD-10-CM

## 2025-06-03 PROCEDURE — 99213 OFFICE O/P EST LOW 20 MIN: CPT | Performed by: INTERNAL MEDICINE

## 2025-06-03 NOTE — PATIENT INSTRUCTIONS
[facility] Sleep Medicine Nursing Support:  When: Monday through Friday 7A-5PM except holidays  Where: Our direct line is [phone] Option 3.    If you are having problems with equipment or need a mask fitting you may want to reach out to your durable medical equipment company first    IMPORTANT CONTACT PHONE NUMBERS:  Both the Sleep Medicine and Pulmonary Department manages CPAP/BIPAPs.  If depends on where you are typically seen on who to contact  [facility] Sleep Medicine Nursing Support: [phone] Option 3 (if you are seen in a [facility] Sleep Medicine office)  [facility] Pulmonary: [phone] (if you are seen in a [facility] Pulmonary office)  [facility] - [phone] ([place]), [phone] ([place])  If you are unsure, please send me a message on [email or web address] and I can help    CPAP/BIPAP MAINTENANCE AND MANAGEMENT  1.  Continue use of CPAP/BIPAP equipment nightly - use any time you are laying down to rest, watch TV, etc to increase use and in case you fall asleep to prevent falling asleep without it  2.  If air is too hot, turn down the tubing heating setting  3.  If excessive dry mouth in the morning, turn up humidifier setting and be sure to only use distilled water in your humidifier.  You can also turn down the tubing heating setting  4.  Change your filter regularly and wash the non-disposable filter  5.  Continue to clean your equipment, as discussed, using mild soap and water.  You can use unscented baby wipe on the mask.  6.  Contact the Sleep Disorders Center with any questions or concerns prior to your next visit, as needed  7.  Schedule visit for follow-up in 1 year.  You should see your sleep physician at least once a year.      HOW TO CLEAN YOUR AUTO CPAP MACHINE  Mask: Clean the cushion of your mask daily. Dish soap and warm water.  (Usually eligible for mask cushions every 3 months)  2.  Headgear: Once a week. I find when you wash it daily it eats the material of the  Velcro faster.  (Usually eligible for new headgear every 6 months)  3.  Heated Tubing: Clean the tube every 3-7 days. One drop of dish soap run warm water through the tube then hang it over your shower curtain and let it drip dry. (Usually eligible every 3 months)  4.  Humidifier: Rinse out every 1-3 days. Dish soap warm water or , top shelf. (eligible every 6 months) **DISTILLED WATER ONLY**  5. Filters:  Dark blue (reusable for 6 months)- Rinse under warm water (no soap) sit and drip dry every 2-3 weeks.  (eligible for a new one every 6 months)  Light Blue (disposable) throw away every 2-4 weeks depending on how dirty it looks. (eligible for 6 every 3 months)    Medicare/Private Insurance Requirements with CPAP  Your insurance requires a face-to-face follow up visit within a 31-90 day period after starting CPAP.  Your insurance requires compliance with CPAP, which is at least 4 hours per night for 70% of the time. This must be done over a 30 day period and must occur within the initial 31-90 day period after starting CPAP.  Your insurance also requires at least yearly follow ups to continue to pay for CPAP supplies.  Check with your insurance and durable medical equipment company regarding supply replacements.     OTHER SLEEP MEDICINE ISSUES  If you are having a true emergency please call 911.  In the event that the line is busy or it is after hours please leave a voice message and we will return your call.  Please speak clearly, leaving your full name, birth date, best number to reach you and the reason for your call.   Medication refills: We will need the name of the medication, the dosage, the ordering provider, whether you get a 30 or 90 day refill, and the pharmacy name and address.  Medications will be ordered by the provider only.  Nurses cannot call in prescriptions.  Please allow 7 days for medication refills.  Physician requested updates: If your provider requested that you call with an update  after starting medication, please be ready to provide us the medication and dosage, what time you take your medication, the time you attempt to fall asleep, time you fall asleep, when you wake up, and what time you get out of bed.  Sleep Study Results: We will contact you with sleep study results and/or next steps after the physician has reviewed your testing.  Usually one of our nurses will call to talk about the results within 1-2 weeks of testing.

## 2025-06-04 ENCOUNTER — TELEPHONE (OUTPATIENT)
Age: 60
End: 2025-06-04

## 2025-06-04 ENCOUNTER — TELEPHONE (OUTPATIENT)
Dept: SLEEP CENTER | Facility: CLINIC | Age: 60
End: 2025-06-04

## 2025-06-04 DIAGNOSIS — M17.12 PRIMARY OSTEOARTHRITIS OF LEFT KNEE: Primary | ICD-10-CM

## 2025-06-04 NOTE — TELEPHONE ENCOUNTER
Caller: Patient  Doctor/office: Dr Avila  CB#: [phone]      Patient called to start auth/delivery for VISCO(Gel) injections.    R/L/Bilateral knee: left  Pain Level (scale 1-10): 7  Date of last Gel Injection: 12/24/24  Did the last injection work well for you? yes  Have you taken                   NSAIDS (Ibuprofen, Meloxicam, Naproxen)? yes                   Tylenol? yes  Have you done Home exercises/Physical Therapy? no  Have you had a cortisone/steroid injection? Yes, about 1 year ago      Educated patient on Visco procedure. Auth team will review insurance and process the request appropriately. Patient will be contacted if the have any questions and when ready to schedule.

## 2025-06-17 ENCOUNTER — PROCEDURE VISIT (OUTPATIENT)
Dept: OBGYN CLINIC | Facility: CLINIC | Age: 60
End: 2025-06-17
Payer: COMMERCIAL

## 2025-06-17 DIAGNOSIS — G89.29 CHRONIC PAIN OF LEFT KNEE: ICD-10-CM

## 2025-06-17 DIAGNOSIS — M17.12 PRIMARY OSTEOARTHRITIS OF LEFT KNEE: Primary | ICD-10-CM

## 2025-06-17 DIAGNOSIS — M25.562 CHRONIC PAIN OF LEFT KNEE: ICD-10-CM

## 2025-06-17 PROCEDURE — 20610 DRAIN/INJ JOINT/BURSA W/O US: CPT

## 2025-06-17 NOTE — PROGRESS NOTES
"Assessment/Plan:    Moderate persistent asthma without complication  After reviewing inhaler technique again I think that part of the reason for Lupe's lack of control is how she is inhaling her Breo.  I reinforced proper inhaler technique and will monitor over the next 2 weeks before escalating therapy.  I reviewed her most recent x-ray and PFTs.     Diagnoses and all orders for this visit:    Moderate persistent asthma without complication  -     Complete PFT without post bronchodilator; Future    Severe persistent asthma without complication          Subjective:      Patient ID: Lupe Azul is a 74 y.o. female.    Lupe is here for follow-up of her asthma.  For the most part she feels fairly well-controlled most days however she is using her rescue inhaler approximately once every other day.  She denies any cough or wheeze.    primary symptoms  Associated symptoms include chest pain. Pertinent negatives include no fever, headaches, myalgias or sore throat.       The following portions of the patient's history were reviewed and updated as appropriate: allergies, current medications, past family history, past medical history, past social history, past surgical history, and problem list.    Review of Systems   Constitutional:  Negative for appetite change and fever.   HENT:  Positive for postnasal drip. Negative for ear pain, rhinorrhea, sneezing, sore throat and trouble swallowing.    Respiratory:  Positive for shortness of breath.    Cardiovascular:  Positive for chest pain.   Musculoskeletal:  Negative for myalgias.   Neurological:  Negative for headaches.         Objective:      /88 (BP Location: Left arm, Patient Position: Sitting, Cuff Size: Standard)   Pulse 76   Temp 98 °F (36.7 °C) (Tympanic)   Ht 5' 1\" (1.549 m)   Wt 54 kg (119 lb)   SpO2 96%   BMI 22.48 kg/m²          Physical Exam  Constitutional:       Appearance: She is well-developed.   HENT:      Head: Normocephalic.   Eyes:     " Large joint arthrocentesis: L knee    Performed by: Amara Contreras PA-C  Authorized by: Amara Contreras PA-C    Universal Protocol:  Consent: Verbal consent obtained  Risks and benefits: risks, benefits and alternatives were discussed  Consent given by: patient  Timeout called at: 6/17/2025 12:03 PM.  Patient understanding: patient states understanding of the procedure being performed  Patient consent: the patient's understanding of the procedure matches consent given  Site marked: the operative site was marked  Radiology Images displayed and confirmed. If images not available, report reviewed: imaging studies available  Patient identity confirmed: verbally with patient  Supporting Documentation  Indications: pain     Is this a Visco injection? Yes  Non-Pharmacologic Treatments Attempted: Diet and Home Exercise  Pharmacologic Treatments Attempted: Prior CSIs and Visco injections  Pain Score: 7Procedure Details  Location: knee - L knee  Needle size: 22 G  Ultrasound guidance: no  Approach: anterolateral  Medications administered: 2 mL sodium hyaluronate 16.8 MG/2ML    Patient tolerance: patient tolerated the procedure well with no immediate complications  Dressing:  Sterile dressing applied        Agustina presents for her first of 3 left knee injections to treat her left primary knee osteoarthritis. Today, the patient reports 7/10 left knee pain.  The patient's most recent Visco injection was 12/24/2024 and she notes adequate relief from this injection until about 3 to 4 weeks ago. The patient [facility] to consider knee replacement in the setting of chronic, debilitating knee pain. On exam, there is no joint effusion and the patient has full range of motion.  Prior to proceeding with today's injection, we reviewed that undergoing injections would inhibit her from undergoing left knee surgery for ideally at least 12 weeks due to infection risk.  The   Pupils: Pupils are equal, round, and reactive to light.   Cardiovascular:      Rate and Rhythm: Normal rate.      Heart sounds: No murmur heard.  Pulmonary:      Effort: Pulmonary effort is normal. No respiratory distress.      Breath sounds: Normal breath sounds. No wheezing or rales.   Abdominal:      Palpations: Abdomen is soft.   Musculoskeletal:         General: Normal range of motion.      Cervical back: Normal range of motion and neck supple.   Skin:     General: Skin is warm and dry.   Neurological:      Mental Status: She is alert and oriented to person, place, and time.           Answers submitted by the patient for this visit:  Pulmonology Questionnaire (Submitted on 2/6/2024)  Chief Complaint: Primary symptoms  Do you have chest tightness?: Yes  Do you have a hoarse voice?: Yes  Chronicity: chronic  When did you first notice your symptoms?: more than 1 year ago  How often do your symptoms occur?: 2 to 4 times per day  Since you first noticed this problem, how has it changed?: waxing and waning  Do you have shortness of breath that occurs with effort or exertion?: Yes  Do you have ear congestion?: No  Do you have heartburn?: No  Do you have fatigue?: Yes  Do you have nasal congestion?: No  Do you have shortness of breath when lying flat?: No  Do you have shortness of breath when you wake up?: Yes  Do you have sweats?: No  Have you experienced weight loss?: No  Which of the following makes your symptoms worse?: any activity, exercise, exposure to fumes, exposure to smoke, strenuous activity  Which of the following makes your symptoms better?: oral steroids, rest     patient states she would like to move forward with the injection series starting today and she will likely consider any knee replacement at least 3 months from now. After discussing the risks, benefits, and alternatives to injections, the patient consented for and underwent the procedure without any acute complications or difficulties. Post-injection instructions were reviewed. We will plan to follow up with Agustina in 1 week for the next injection of the series.

## 2025-06-24 ENCOUNTER — PROCEDURE VISIT (OUTPATIENT)
Dept: OBGYN CLINIC | Facility: CLINIC | Age: 60
End: 2025-06-24
Payer: COMMERCIAL

## 2025-06-24 DIAGNOSIS — M17.12 PRIMARY OSTEOARTHRITIS OF LEFT KNEE: Primary | ICD-10-CM

## 2025-06-24 PROCEDURE — 20610 DRAIN/INJ JOINT/BURSA W/O US: CPT | Performed by: ORTHOPAEDIC SURGERY

## 2025-06-27 NOTE — PROGRESS NOTES
Large joint arthrocentesis: L knee    Performed by: Todd Avila MD  Authorized by: Todd Avila MD    Universal Protocol:  Consent: Verbal consent obtained  Risks and benefits: risks, benefits and alternatives were discussed  Consent given by: patient  Timeout called at: 6/17/2025 12:03 PM.  Patient understanding: patient states understanding of the procedure being performed  Patient consent: the patient's understanding of the procedure matches consent given  Site marked: the operative site was marked  Radiology Images displayed and confirmed. If images not available, report reviewed: imaging studies available  Patient identity confirmed: verbally with patient  Supporting Documentation  Indications: pain     Is this a Visco injection? Yes  Non-Pharmacologic Treatments Attempted: Diet and Home Exercise  Pharmacologic Treatments Attempted: Prior CSIs and Visco injections  Pain Score: 4Procedure Details  Location: knee - L knee  Needle size: 22 G  Ultrasound guidance: no  Approach: anterolateral  Medications administered: 2 mL sodium hyaluronate 16.8 MG/2ML    Patient tolerance: patient tolerated the procedure well with no immediate complications  Dressing:  Sterile dressing applied      Patient seen today for second injection.  Has already noticed some relief since first injection.  Pain today is a 4 out of 10.  Procedure was well-tolerated.  She will follow-up next week for third injection

## 2025-07-01 ENCOUNTER — PROCEDURE VISIT (OUTPATIENT)
Dept: OBGYN CLINIC | Facility: CLINIC | Age: 60
End: 2025-07-01
Payer: COMMERCIAL

## 2025-07-01 DIAGNOSIS — M17.12 PRIMARY OSTEOARTHRITIS OF LEFT KNEE: Primary | ICD-10-CM

## 2025-07-01 DIAGNOSIS — M25.562 CHRONIC PAIN OF LEFT KNEE: ICD-10-CM

## 2025-07-01 DIAGNOSIS — G89.29 CHRONIC PAIN OF LEFT KNEE: ICD-10-CM

## 2025-07-01 PROCEDURE — 20610 DRAIN/INJ JOINT/BURSA W/O US: CPT | Performed by: ORTHOPAEDIC SURGERY

## 2025-07-03 NOTE — PROGRESS NOTES
"Patient Name: Agustina Matthew      : 1965       MRN: [id number]   Encounter Provider: Todd Avila MD   Encounter Date: 25  Encounter department: [facility]       Assessment & Plan  Primary osteoarthritis of left knee  Chronic pain of left knee  Final Gelsyn 3 injection left knee 2025  Orders:    Large joint arthrocentesis: L knee       Patient is here for her final injection of Gelsyn-3 into the Left knee.     Patient rates their pain as 5/10 today    Physical exam of the knee shows no effusion, no ecchymosis.    Patient tolerated procedure well. Follow up as needed.     Large joint arthrocentesis: L knee    Performed by: Todd Avila MD  Authorized by: Todd Avila MD    Universal Protocol:  Consent: Verbal consent obtained  Risks and benefits: risks, benefits and alternatives were discussed  Consent given by: patient  Time out: Immediately prior to procedure a \"time out\" was called to verify the correct patient, procedure, equipment, support staff and site/side marked as required.  Patient understanding: patient states understanding of the procedure being performed  Site marked: the operative site was marked  Patient identity confirmed: verbally with patient  Supporting Documentation  Indications: pain     Is this a Visco injection? Yes  Non-Pharmacologic Treatments Attempted: Diet and Home Exercise  Pharmacologic Treatments Attempted: Prior CSI  Pain Score: 5Procedure Details  Location: knee - L knee  Preparation: Patient was prepped and draped in the usual sterile fashion  Needle size: 22 G  Approach: anterolateral  Medications administered: 2 mL sodium hyaluronate 16.8 MG/2ML    Patient tolerance: patient tolerated the procedure well with no immediate complications  Dressing:  Sterile dressing applied        Scribe Attestation      I,:  Amalia Perales am acting as a scribe while in the presence of the attending physician.:  "      I,:  Todd Avila MD personally performed the services described in this documentation    as scribed in my presence.:

## 2025-07-14 DIAGNOSIS — I10 ESSENTIAL HYPERTENSION: ICD-10-CM

## 2025-07-14 DIAGNOSIS — F32.A ANXIETY AND DEPRESSION: ICD-10-CM

## 2025-07-14 DIAGNOSIS — F41.9 ANXIETY AND DEPRESSION: ICD-10-CM

## 2025-07-15 ENCOUNTER — NURSE TRIAGE (OUTPATIENT)
Age: 60
End: 2025-07-15

## 2025-07-16 RX ORDER — CITALOPRAM HYDROBROMIDE 20 MG/1
20 TABLET ORAL DAILY
Qty: 30 TABLET | Refills: 5 | Status: SHIPPED | OUTPATIENT
Start: 2025-07-16

## 2025-07-16 RX ORDER — METOPROLOL SUCCINATE 25 MG/1
25 TABLET, EXTENDED RELEASE ORAL DAILY
Qty: 30 TABLET | Refills: 5 | Status: SHIPPED | OUTPATIENT
Start: 2025-07-16

## 2025-07-18 ENCOUNTER — TELEPHONE (OUTPATIENT)
Age: 60
End: 2025-07-18

## 2025-07-18 NOTE — TELEPHONE ENCOUNTER
Patient calling in stating her diarrhea got better for about 1 day and she cxld her 7/16/25 but wants to be seen today since symptoms are still there and more having diarrhea more frequently. There are no available opening for today    Please advise

## 2025-07-21 NOTE — TELEPHONE ENCOUNTER
Patient stated that she is feeling better but is still experiencing Diarrhea. I offered Patient appointment, she declined. Patient stated that her co-workers have same sxs and they are having the water jug tested.